# Patient Record
Sex: MALE | Race: WHITE | NOT HISPANIC OR LATINO | URBAN - METROPOLITAN AREA
[De-identification: names, ages, dates, MRNs, and addresses within clinical notes are randomized per-mention and may not be internally consistent; named-entity substitution may affect disease eponyms.]

---

## 2017-02-20 ENCOUNTER — APPOINTMENT (OUTPATIENT)
Dept: PHYSICAL THERAPY | Age: 67
End: 2017-02-20
Payer: COMMERCIAL

## 2017-02-20 PROCEDURE — 97162 PT EVAL MOD COMPLEX 30 MIN: CPT

## 2017-03-22 ENCOUNTER — APPOINTMENT (OUTPATIENT)
Dept: PHYSICAL THERAPY | Age: 67
End: 2017-03-22
Payer: COMMERCIAL

## 2017-03-22 PROCEDURE — 97750 PHYSICAL PERFORMANCE TEST: CPT

## 2017-03-22 PROCEDURE — 97530 THERAPEUTIC ACTIVITIES: CPT

## 2023-06-21 ENCOUNTER — RA CDI HCC (OUTPATIENT)
Dept: OTHER | Facility: HOSPITAL | Age: 73
End: 2023-06-21

## 2023-06-21 NOTE — PROGRESS NOTES
[facility] 75  coding opportunities       Chart reviewed, no opportunity found: CHART REVIEWED, NO OPPORTUNITY FOUND        Patients Insurance        Commercial Insurance: Rod Mariscal verbal cues/1 person assist

## 2023-08-23 ENCOUNTER — OFFICE VISIT (OUTPATIENT)
Dept: INTERNAL MEDICINE CLINIC | Age: 73
End: 2023-08-23
Payer: MEDICARE

## 2023-08-23 ENCOUNTER — TELEPHONE (OUTPATIENT)
Dept: ADMINISTRATIVE | Facility: OTHER | Age: 73
End: 2023-08-23

## 2023-08-23 VITALS
TEMPERATURE: 98.7 F | HEIGHT: 63 IN | SYSTOLIC BLOOD PRESSURE: 124 MMHG | DIASTOLIC BLOOD PRESSURE: 86 MMHG | OXYGEN SATURATION: 99 % | WEIGHT: 149.1 LBS | HEART RATE: 54 BPM | BODY MASS INDEX: 26.42 KG/M2

## 2023-08-23 DIAGNOSIS — R41.89 COGNITIVE IMPAIRMENT: Primary | ICD-10-CM

## 2023-08-23 DIAGNOSIS — G93.32 CHRONIC FATIGUE SYNDROME: ICD-10-CM

## 2023-08-23 DIAGNOSIS — D50.8 OTHER IRON DEFICIENCY ANEMIA: ICD-10-CM

## 2023-08-23 DIAGNOSIS — B35.1 ONYCHOMYCOSIS: ICD-10-CM

## 2023-08-23 PROBLEM — D50.9 IRON DEFICIENCY ANEMIA: Status: ACTIVE | Noted: 2023-08-23

## 2023-08-23 PROBLEM — L60.9 NAIL ABNORMALITY: Status: ACTIVE | Noted: 2023-08-23

## 2023-08-23 PROCEDURE — 99214 OFFICE O/P EST MOD 30 MIN: CPT | Performed by: INTERNAL MEDICINE

## 2023-08-23 RX ORDER — ITRACONAZOLE 100 MG/1
200 CAPSULE ORAL 2 TIMES DAILY
Qty: 28 CAPSULE | Refills: 2 | Status: SHIPPED | OUTPATIENT
Start: 2023-08-23 | End: 2023-08-30

## 2023-08-23 RX ORDER — CHLORAL HYDRATE 500 MG
2000 CAPSULE ORAL DAILY
COMMUNITY

## 2023-08-23 RX ORDER — MULTIVIT WITH MINERALS/LUTEIN
1 TABLET ORAL DAILY
COMMUNITY

## 2023-08-23 NOTE — PROGRESS NOTES
Name: Mateo Steinberg      : 1950      MRN: [id number]  Encounter Provider: Kasia Tolliver MD  Encounter Date: 2023   Encounter department: [address] [facility]     1. Cognitive impairment    2. Chronic fatigue syndrome      BMI Counseling: Body mass index is 26.48 kg/m². The BMI is above normal. Nutrition recommendations include decreasing portion sizes, encouraging healthy choices of fruits and vegetables and moderation in carbohydrate intake. Exercise recommendations include moderate physical activity 150 minutes/week. Rationale for BMI follow-up plan is due to patient being overweight or obese. Depression Screening and Follow-up Plan: Patient was screened for depression during today's encounter. They screened negative with a PHQ-2 score of 2. Subjective     HPI  Review of Systems   Constitutional: Positive for fatigue. Negative for appetite change and fever. HENT: Negative for congestion, ear pain, hearing loss, nosebleeds, sneezing, tinnitus and voice change. Eyes: Negative for pain, discharge and redness. Respiratory: Negative for cough, chest tightness and wheezing. Cardiovascular: Negative for chest pain, palpitations and leg swelling. Gastrointestinal: Negative for abdominal pain, blood in stool, constipation, diarrhea, nausea and vomiting. Genitourinary: Negative for difficulty urinating, dysuria, hematuria and urgency. Musculoskeletal: Negative for arthralgias, back pain, gait problem and joint swelling. Skin: Negative for rash and wound. Allergic/Immunologic: Negative for environmental allergies. Neurological: Negative for dizziness, tremors, seizures, weakness, light-headedness and numbness. Hematological: Negative for adenopathy. Does not bruise/bleed easily. Psychiatric/Behavioral: Positive for dysphoric mood. Negative for behavioral problems and confusion. The patient is nervous/anxious.         History Spoke with patient regarding the below.  Patient having some reflux at night.  Bentyl no helping.  Patient had a Paraesophageal hernia repair about 2 years ago.  Currently not taking any heartburn medication.  Discussed going back on Protonix and seeing if this helps control symptoms.  Patient agreeable.  Script for Protonix sent to patient's preferred pharmacy.  Patient aware to call office if symptoms do not improve.   reviewed. No pertinent past medical history. History reviewed. No pertinent surgical history.   Family History   Problem Relation Age of Onset    Brain cancer Mother     Aneurysm Father      Social History     Socioeconomic History    Marital status: /Civil Union     Spouse name: None    Number of children: None    Years of education: None    Highest education level: None   Occupational History    None   Tobacco Use    Smoking status: Former     Packs/day: 0.01     Years: 1.00     Total pack years: 0.01     Types: Cigarettes     Quit date:      Years since quittin.6    Smokeless tobacco: Never    Tobacco comments:     Social    Vaping Use    Vaping Use: Never used   Substance and Sexual Activity    Alcohol use: Not Currently    Drug use: Never    Sexual activity: None   Other Topics Concern    None   Social History Narrative    None     Social Determinants of Health     Financial Resource Strain: Not on file   Food Insecurity: Not on file   Transportation Needs: Not on file   Physical Activity: Not on file   Stress: Not on file   Social Connections: Not on file   Intimate Partner Violence: Not on file   Housing Stability: Not on file     Current Outpatient Medications on File Prior to Visit   Medication Sig    Ascorbic Acid (vitamin C) 1000 MG tablet Take 1 tablet by mouth in the morning    cycloSPORINE (Restasis) 0.05 % ophthalmic emulsion Administer 1 drop to both eyes every 12 (twelve) hours    MAGNESIUM CARBONATE PO Take 1 tablet by mouth in the morning    NON FORMULARY Triple magnesium (glycinate 10%, oxide 60%, malate 30%,)    NON FORMULARY Pycnogenol supplement    Omega-3 Fatty Acids (fish oil) 1,000 mg Take 2,000 mg by mouth daily    Pediatric Multivitamins-Fl (MULT-VITAMIN/FLUORIDE PO) Take 1 tablet by mouth in the morning     No Known Allergies  Immunization History   Administered Date(s) Administered    COVID-19 MODERNA VACC 0.5 ML IM 2021, 2021    INFLUENZA 2017, 11/01/2018, 11/08/2020, 11/07/2021    Pneumococcal Conjugate 13-Valent 02/26/2019    Pneumococcal Polysaccharide PPV23 11/15/2020    Tdap 04/04/2007, 08/22/2018    Tetanus Toxoid, Unspecified 08/22/2018    Zoster 11/15/2020    Zoster Vaccine Recombinant 11/13/2020       Objective     /86 (BP Location: Left arm, Patient Position: Sitting, Cuff Size: Standard)   Pulse (!) 54   Temp 98.7 °F (37.1 °C) (Temporal)   Ht 5' 2.91" (1.598 m)   Wt 67.6 kg (149 lb 1.6 oz)   SpO2 99%   BMI 26.48 kg/m²     Physical Exam  Constitutional:       Appearance: He is well-developed. HENT:      Right Ear: Tympanic membrane and external ear normal.      Left Ear: Tympanic membrane normal.   Eyes:      Conjunctiva/sclera: Conjunctivae normal.      Pupils: Pupils are equal, round, and reactive to light. Neck:      Thyroid: No thyromegaly. Vascular: No JVD. Cardiovascular:      Rate and Rhythm: Normal rate and regular rhythm. Heart sounds: Normal heart sounds. Pulmonary:      Breath sounds: Normal breath sounds. Abdominal:      General: Bowel sounds are normal.      Palpations: Abdomen is soft. Musculoskeletal:         General: Normal range of motion. Cervical back: Normal range of motion. Lymphadenopathy:      Cervical: No cervical adenopathy. Skin:     General: Skin is dry. Neurological:      General: No focal deficit present. Mental Status: He is alert and oriented to person, place, and time. Mental status is at baseline. Deep Tendon Reflexes: Reflexes are normal and symmetric.    Psychiatric:         Mood and Affect: Mood normal.         Behavior: Behavior normal.       Randal Santiago MD

## 2023-08-23 NOTE — TELEPHONE ENCOUNTER
Upon review of the In Basket request and the patient's chart, initial outreach has been made via fax to facility. Please see Contacts section for details.      Thank you  Melinda Rankin MA

## 2023-08-23 NOTE — LETTER
Procedure Request Form: Colonoscopy      Date Requested: 23  Patient: Alejandra Hayward  Patient : 1950   Referring Provider: Yas Thakur MD        Date of Procedure ______________________________       The above patient has informed us that they have completed their   most recent Colonoscopy at your facility. Please complete   this form and attach all corresponding procedure reports/results. Comments _____ DOS 2015 ____________________________________________  ____________________________________________________________________  ____________________________________________________________________  ____________________________________________________________________    Facility Completing Procedure _________________________________________    Form Completed By (print name) _______________________________________      Signature __________________________________________________________      These reports are needed for  compliance. Please fax this completed form and a copy of the procedure report to our office located at [address] [place] [address] as soon as possible to Fax [phone] judah Sequeirare: Phone [phone]    We thank you for your assistance in treating our mutual patient.

## 2023-08-23 NOTE — TELEPHONE ENCOUNTER
----- Message from Michael Sagastume MA sent at 8/23/2023 11:34 AM EDT -----  Regarding: colonoscopy  08/23/23 11:34 AM    Hello, our patient Diannah Denver has had CRC: Colonoscopy completed/performed. Please assist in updating the patient chart by making an External outreach to [facility]) facility located in [facility]. The date of service is unknown.     Thank you,  Michael Sagastume MA  [facility]

## 2023-08-24 NOTE — TELEPHONE ENCOUNTER
Upon review of the In Basket request we were able to locate, review, and update the patient chart as requested for CRC: Colonoscopy. Any additional questions or concerns should be emailed to the Practice Liaisons via the appropriate education email address, please do not reply via In Basket.     Thank you  Sailaja White MA

## 2023-08-25 ENCOUNTER — TELEPHONE (OUTPATIENT)
Dept: INTERNAL MEDICINE CLINIC | Age: 73
End: 2023-08-25

## 2023-08-25 NOTE — TELEPHONE ENCOUNTER
Received a fax from the pharmacy for prior authorization on Itraxonazole capsules     Will scan into Media and give the clinical team as well

## 2023-08-25 NOTE — TELEPHONE ENCOUNTER
This approval authorizes your coverage from 01/01/2023 - 02/21/2024.     Spoke to [place] and wife aware of approval.

## 2023-09-08 ENCOUNTER — TELEPHONE (OUTPATIENT)
Dept: INTERNAL MEDICINE CLINIC | Facility: OTHER | Age: 73
End: 2023-09-08

## 2024-01-22 ENCOUNTER — OFFICE VISIT (OUTPATIENT)
Dept: INTERNAL MEDICINE CLINIC | Age: 74
End: 2024-01-22
Payer: MEDICARE

## 2024-01-22 VITALS
BODY MASS INDEX: 27.64 KG/M2 | OXYGEN SATURATION: 98 % | WEIGHT: 156 LBS | SYSTOLIC BLOOD PRESSURE: 130 MMHG | TEMPERATURE: 97.8 F | HEART RATE: 68 BPM | DIASTOLIC BLOOD PRESSURE: 70 MMHG | HEIGHT: 63 IN

## 2024-01-22 DIAGNOSIS — M54.50 ACUTE LEFT-SIDED LOW BACK PAIN WITHOUT SCIATICA: Primary | ICD-10-CM

## 2024-01-22 PROCEDURE — 99213 OFFICE O/P EST LOW 20 MIN: CPT | Performed by: INTERNAL MEDICINE

## 2024-01-22 RX ORDER — NAPROXEN 500 MG/1
500 TABLET ORAL 2 TIMES DAILY WITH MEALS
Qty: 20 TABLET | Refills: 1 | Status: SHIPPED | OUTPATIENT
Start: 2024-01-22 | End: 2024-02-02 | Stop reason: ALTCHOICE

## 2024-01-22 NOTE — PROGRESS NOTES
"Assessment/Plan:    Acute left-sided low back pain without sciatica  Start naprosyn 500 mg BID  Take with food, avoid other nsaids  Offered flexeril 5mg but pt and wife defer  Ice alt heat, do not leave heat on for > 15 min, stretching  Refer to pt  Call if symptoms worsen       Diagnoses and all orders for this visit:    Acute left-sided low back pain without sciatica  -     naproxen (Naprosyn) 500 mg tablet; Take 1 tablet (500 mg total) by mouth 2 (two) times a day with meals  -     Ambulatory Referral to Physical Therapy; Future               Subjective:          Patient ID: Jose Raza is a 73 y.o. male.    He was lifting weights and walks on the treadmill.  He noticed his back was sore but does not remember a specific inciting event. He then needed to shovel the driveway as his  was broken and tehn pain increased and has not really improved since that time.  He has tried heat so much that is caused the skin to blister.  He has tried advil 200 mg with some relief.      Back Pain  This is a new problem. The current episode started 1 to 4 weeks ago (2-3 weeks). The problem occurs constantly. The problem has been waxing and waning since onset. The pain is present in the lumbar spine. Quality: \"its not good\" The pain does not radiate. The pain is at a severity of 10/10. The pain is severe. The pain is Worse during the day. Exacerbated by: getting up. Pertinent negatives include no abdominal pain, bladder incontinence, bowel incontinence, chest pain, dysuria, fever, headaches, leg pain, numbness, paresis, paresthesias, perianal numbness, tingling, weakness or weight loss. He has tried heat and NSAIDs for the symptoms. The treatment provided mild relief.       The following portions of the patient's history were reviewed and updated as appropriate: allergies, current medications, past family history, past medical history, past social history, past surgical history, and problem list.    Review of Systems " "  Constitutional:  Negative for chills, fatigue, fever and weight loss.   HENT:  Positive for rhinorrhea. Negative for congestion and sore throat.         Chronic \"its the season\"   Eyes:  Negative for visual disturbance.   Respiratory:  Negative for cough and shortness of breath.    Cardiovascular:  Negative for chest pain, palpitations and leg swelling.   Gastrointestinal:  Negative for abdominal pain, bowel incontinence, constipation, diarrhea, nausea and vomiting.   Genitourinary:  Negative for bladder incontinence and dysuria.   Musculoskeletal:  Positive for back pain.   Neurological:  Negative for dizziness, tingling, weakness, numbness, headaches and paresthesias.   Psychiatric/Behavioral:  Negative for dysphoric mood. The patient is not nervous/anxious.          History reviewed. No pertinent past medical history.      Current Outpatient Medications:     Ascorbic Acid (vitamin C) 1000 MG tablet, Take 1 tablet by mouth in the morning, Disp: , Rfl:     cycloSPORINE (Restasis) 0.05 % ophthalmic emulsion, Administer 1 drop to both eyes every 12 (twelve) hours, Disp: , Rfl:     MAGNESIUM CARBONATE PO, Take 1 tablet by mouth in the morning, Disp: , Rfl:     naproxen (Naprosyn) 500 mg tablet, Take 1 tablet (500 mg total) by mouth 2 (two) times a day with meals, Disp: 20 tablet, Rfl: 1    NON FORMULARY, Triple magnesium (glycinate 10%, oxide 60%, malate 30%,), Disp: , Rfl:     NON FORMULARY, Pycnogenol supplement, Disp: , Rfl:     Omega-3 Fatty Acids (fish oil) 1,000 mg, Take 2,000 mg by mouth daily, Disp: , Rfl:     Pediatric Multivitamins-Fl (MULT-VITAMIN/FLUORIDE PO), Take 1 tablet by mouth in the morning, Disp: , Rfl:     No Known Allergies    Social History   Past Surgical History:   Procedure Laterality Date    HERNIA REPAIR       Family History   Problem Relation Age of Onset    Brain cancer Mother     Aneurysm Father        Health Maintenance   Topic Date Due    Hepatitis C Screening  Never done    Medicare " "Annual Wellness Visit (AWV)  Never done    Fall Risk  Never done    Zoster Vaccine (2 of 2) 01/10/2021    Influenza Vaccine (1) 09/01/2023    COVID-19 Vaccine (3 - 2023-24 season) 09/01/2023    Depression Screening  08/23/2024    Colorectal Cancer Screening  04/30/2025    Pneumococcal Vaccine: 65+ Years  Completed    HIB Vaccine  Aged Out    IPV Vaccine  Aged Out    Hepatitis A Vaccine  Aged Out    Meningococcal ACWY Vaccine  Aged Out    HPV Vaccine  Aged Out     Immunization History   Administered Date(s) Administered    COVID-19 MODERNA VACC 0.5 ML IM 01/22/2021, 02/26/2021    INFLUENZA 11/12/2017, 11/01/2018, 11/08/2020, 11/07/2021    Pneumococcal Conjugate 13-Valent 02/26/2019    Pneumococcal Polysaccharide PPV23 11/15/2020    Tdap 04/04/2007, 08/22/2018    Tetanus Toxoid, Unspecified 08/22/2018    Zoster 11/15/2020    Zoster Vaccine Recombinant 11/13/2020         Objective:  /70 (BP Location: Left arm, Patient Position: Sitting, Cuff Size: Standard)   Pulse 68   Temp 97.8 °F (36.6 °C) (Temporal)   Ht 5' 2.91\" (1.598 m)   Wt 70.8 kg (156 lb)   SpO2 98%   BMI 27.71 kg/m²   Body mass index is 27.71 kg/m².     Physical Exam  Constitutional:       General: He is not in acute distress.     Appearance: He is well-developed. He is not diaphoretic.   HENT:      Head: Normocephalic and atraumatic.      Right Ear: External ear normal.      Left Ear: External ear normal.      Nose: Nose normal.      Mouth/Throat:      Pharynx: No oropharyngeal exudate.   Eyes:      Conjunctiva/sclera: Conjunctivae normal.      Pupils: Pupils are equal, round, and reactive to light.   Cardiovascular:      Rate and Rhythm: Normal rate and regular rhythm.      Heart sounds: Normal heart sounds. No murmur heard.  Pulmonary:      Effort: Pulmonary effort is normal. No respiratory distress.      Breath sounds: Normal breath sounds. No wheezing or rales.   Musculoskeletal:         General: No tenderness. Normal range of motion.      " Cervical back: Normal range of motion and neck supple.      Right lower leg: No edema.      Left lower leg: No edema.      Comments: Back examined.  NTTP, FROM but has pain with flexion ext and left lateral bending.  - SLR bilaterally   Lymphadenopathy:      Cervical: No cervical adenopathy.   Skin:     Comments: Healing burn midline low back.  No drainage warmth or signs of infection   Neurological:      Mental Status: He is alert and oriented to person, place, and time.      Motor: No weakness.      Deep Tendon Reflexes: Reflexes normal.      Comments: Strength 5/5 BLE, reflexes 2+ patellar and achilles

## 2024-01-22 NOTE — ASSESSMENT & PLAN NOTE
Start naprosyn 500 mg BID  Take with food, avoid other nsaids  Offered flexeril 5mg but pt and wife defer  Ice alt heat, do not leave heat on for > 15 min, stretching  Refer to pt  Call if symptoms worsen

## 2024-01-26 NOTE — PROGRESS NOTES
"Speech-Language Pathology Initial Evaluation    Today's date: 2024   Patient’s name: Jose Raza  : 1950  MRN: [id number]  Safety measures: Cognitive-linguistic deficits,   Referring provider: Vivian Calderon CRNP    Encounter Diagnosis     ICD-10-CM    1. Other symbolic dysfunctions  R48.8       2. Age-related cognitive decline  R41.81         Visit tracking:    Re-eval Due POC Expires Auth Expiration Date  Visit Limit   24 or 10th visit 24 or 10th visit 24 BOMN                           Visit/Unit Tracking  AUTH Status:  Date 24   No authorization needed Used 1    Remaining  9     Subjective comments: Patient reports that he is good.    His wife reports that this is the third time that he has had a script for speech. He had speech therapy before for a couple of sessions and didn't have a good experience. A few years later he had another session of speech therapy for about 6 weeks.     He has memory loss and he is taking a lot of supplements to counteract that. They correlate this with his long history of candida.     He needs tips on word finding and thought processing skills.     They feel that he is relatively the same since he first was referred to .  He has been dealing with this for roughly 5 years.     He also deals with anxiety. 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    He had seen someone for anxiety but isn't currently seeing anyone. He was on Zoloft at one point.     They would like tips for dealing with anxiety as it relates to his cognition. He would like tips/an arsenal of strategies to reduce symptoms, therefore decreasing anxiety.    Why did the patient choose us? Physician    Patient's goal(s): \"Having the ability to do things that I had been doing in the past.\"    Reason for referral: Change in cognitive status  Prior functional status: Communication effective and " "appropriate in all situations  Clinically complex situations: Previous therapy to address similar deficits    History: Patient is a 73 y.o. male who was referred to outpatient skilled Speech Therapy services for a cognitive-linguistic evaluation.     Patient has a prior medical history of the following: chronic fatigue syndrome, cognitive impairment and iron deficiency anemia (see history for full list). Patient's wife reports history of candida.     As the patient's referral was not associated with a specific visit with family medicine, recent information on the patient's medical history was limited. Following discussion with the patient, his wife and further chart review, the clinician uncovered medical history from 2019 to 2021 when the patient received outpatients ST services from [facility]. The following is a timeline of evaluation and treatment by [facility] and [facility].    2/27/19 MRI of Brain:    \"Impression:   1. No acute intracranial hemorrhage or acute infarction.   2. Mild interval progression of cerebral volume loss.   3. Grossly stable cerebral white matter disease. Differential considerations   include chronic microvascular ischemia, demyelination, Lyme disease, vasculitis   or chronic migraine syndromes.\"    8/22/19 Neurology evaluation [facility] with Dr. Kaitlyn Gupta:  \"IMPRESSION:   In summary, Jose Raza is a 69 y.o. man presenting to the [facility] for evaluation of 3+ years of memory and language difficulties. On psychometric testing, the patient performed below expectations in multiple cognitive domains. Given his mild functional impairment (FRS 2), the patient meets clinical criteria for mild cognitive impairment. Review of MRI brain w/o contrast revealed progressive biparietal and hippocampal atrophy suggestive of amyloid pathology. I recommend consultation to speech pathology. I discussed the " "typical rate of progression of MCI to dementia is 10-12% per year. N.b. When discussing the diagnosis the patient almost fainted and had to be helped to the bed to lay down for a few minutes from what the patient called \"shock\" at the diagnosis. I discussed possible enrollment in clinical trials (ADNI3, PEGASUS, ABC). I asked the patient and family to meet with  after this visit to discuss management of MCI and possible enrollment in clinical trials further.    I strongly encouraged social, mental, and physical activity; all of which have demonstrated some potential capacity to slow down the course of cognitive decline. I think regular exercise and social activities are great.\"    10/29/19 ST Evaluation at [facility] with EMANUEL Lozada. Patient scored 28/46 on BCAT (unable to assess visuospatial). Patient participated in 2 treatment sessions. Clinician recommended follow-up with Neurology.     5/21/20 - Telemedicine visit with Dr. 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 MRI ordered. No evidence of repeat MRI in chart.    9/13/21 - 10/25/21 ST Evaluation and Treatment at [facility] with EMANUEL Ng. Patient scored 30/50 on the BCAT.     No further ST treatment, Neurology follow-up or repeat MRIs noted.     Hearing: WFL  Vision: Wears prescription glasses    Home environment/lifestyle: Lives with his wife  Highest level of education: Bachelor's degree  Vocational status: Volunteers as a [employer] . He retired from a chemical company. He was a .     Mental status: Alert  Behavior status: Cooperative  Patient reported symptoms of:  Wife reported symptoms of anxiety noted in the patient.   Communication modalities: Verbal  Rehabilitation prognosis: Fair rehab potential to reach and maintain prior level of function    Assessments    The Brief Cognitive Assessment Test (BCAT) is a multi-domain cognitive tool that assesses a patient’s " "orientation, verbal recall, visual recognition, visual recall, attention, abstraction, language, executive functions, and visuospatial processing. BCAT is sensitive to the full spectrum of cognitive functioning (normal, MCI, mild dementia, moderate-severe dementia) and can predict basic and instrumental activities of daily living (ADL, IADL).     During today’s administration of the test, patient achieved a total score of 14/50 points.     Using the BCAT Crosswalk Table as a reference, patient’s score falls within the range and may be suggestive of \"Moderate to Severe Dementia\". The Crosswalk Table suggests the following:      Cognitive Stage: Moderate to Severe Dementia (BCAT Range: 0-25 // MMSE: 0-18 // GDS: 5-7)  Cognitive & Functional Issues: Moderate (upper end of range) - Pervasive functional deficits (IADLs), but ADLs generally intact; marked deficits in memory and executive functions; behavioral and psychological symptoms are common; requires significant residential support. // Severe (lower end of range) - Needs assistance in ADLs/IADLs; pervasive cognitive deficits; requires complex care.      Goals    Short Term Goals  Patient will be educated on the use of internal and external memory aids and compensatory strategies to facilitate increased recall of routine, personal information, and recent events, to be achieved in 4-6 weeks.    Patient will complete auditory immediate and short term memory tasks to facilitate increased ability to retell narratives and recall information within functional living environment, to be achieved in 4-6 weeks.    Patient will complete complex auditory attention processing tasks (e.g., sentence unscramble, ranking numbers/words, etc.) to improve working memory with 80% accuracy, to be achieved in 4-6 weeks.    Patient will complete thought organization tasks (e.g., sequencing, deduction puzzles, etc.) with 80% accuracy to facilitate increased executive functioning, working " "memory, problem solving, and processing skills, to be achieved in 4-6 weeks.    Patient will complete concrete and abstract categorization tasks to 80% accuracy to facilitate improved generative naming skills and working memory, to be achieved in 4-6 weeks.    Patient will utilize word finding strategies during semantic feature analysis treatment activity for improved naming and verbal expression skills, to be achieved in 4-6 weeks.    Patient will complete word generation tasks (e.g., analogies, category matrices, etc.) with 80% accuracy using word finding strategies to facilitate improved word retrieval skills, to be achieved in 4-6 weeks.    Long Term Goals    Patient will demonstrate cognitive-communication skills consistent with age and education given use of compensatory strategies when needed to resume baseline activities and responsibilities in home, community, and work/school settings by discharge.     Patient will complete cognitive-linguistic therapy that addresses patient’s specific deficits in processing speed, short-term working memory, attention to detail, monitoring, sequencing, and organization skills, with instruction, to alleviate effects of executive functioning disorder deficits by discharge.    Patient will complete higher-level expressive language tasks (e.g., word definitions, idioms, synonym/antonyms, etc) with 80% accuracy to improve functional communication skills by discharge.       Impressions/Recommendations    Impressions:   - Based upon today's evaluation, utilizing the BCAT cognitive assessment, the patient presents with cognition most correlated to \"Moderate to Severe Dementia\". The patient was pleasant and calm throughout the evaluation but required repetition of each prompt and an extended processing time.     He was able to converse but conversation was limited. His wife provided much of his history. Based upon patient's history with evaluation and treatment for cognitive " deficits, clinician does not suspect that word finding difficulty and cognitive deficits are heavily influenced by anxiety. Rather, based upon diagnostic neurological findings, suspect that the patient is in cognitive-linguistic decline due to brain changes, specifically, reduction in volume of the hippocampus, as noted by Dr. Gupta of [facility] (Neurology). The patient has not had updated diagnostic evaluation of the brain since. Suspect that there may be further changes and would recommend follow-up with neurology and repeat MRI.    It should be noted, when the clinician began to discuss results of today's evaluation, the patient's wife requested that the patient leave the room. The patient's wife was not pleased with the administration of the BCAT as she felt that it made the patient anxious. From the clinician's perspective, the patient did not appear anxious, rather he merely had significant difficulty with much of the test. He appeared calm and did not demonstrate typical signs of anxiety. It took him a long time to process each prompt and required repetition of prompts at each step. It should also be noted that the patient's BCAT score has significantly declined since last administered in 2021 by ST staff at [facility].     ST is recommend to address global cognitive linguistic deficits. Strongly recommend referral to neurology and repeat MRI of brain. Plan of care was discussed with the patient's wife. She wishes to think further about recommendations. Patient will be placed on a 30 day hold, should he choose to purse ST services. Following the 30 day hold, patient will be discharged should ST service not be resumed by the patient and his wife.     Recommendations:  -Patient would benefit from outpatient skilled Speech Therapy services: Cognitive-linguistic therapy    -Frequency: 1-2x weekly  -Duration: 4-6 weeks - Patient currently on 30 day hold    -Intervention certification from:  1/29/2024  -Intervention certification to: 2/29/2024 or 10th visit    -Intervention comments:   45 minutes of sound language comprehension time.

## 2024-01-29 ENCOUNTER — EVALUATION (OUTPATIENT)
Dept: SPEECH THERAPY | Facility: CLINIC | Age: 74
End: 2024-01-29
Payer: MEDICARE

## 2024-01-29 DIAGNOSIS — R41.81 AGE-RELATED COGNITIVE DECLINE: ICD-10-CM

## 2024-01-29 DIAGNOSIS — R48.8 OTHER SYMBOLIC DYSFUNCTIONS: Primary | ICD-10-CM

## 2024-01-29 PROCEDURE — 92523 SPEECH SOUND LANG COMPREHEN: CPT | Performed by: SPEECH-LANGUAGE PATHOLOGIST

## 2024-01-29 NOTE — LETTER
2024    NICOL Iraheta  [address] [place], [place] [id number]    Patient: Jose Raza   YOB: 1950   Date of Visit: 2024     Encounter Diagnosis     ICD-10-CM    1. Other symbolic dysfunctions  R48.8       2. Age-related cognitive decline  R41.81           Dear Dr. Calderon:    Thank you for your recent referral of Jose Raza. Please review the attached evaluation summary from Jose's recent visit.     Please verify that you agree with the plan of care by signing the attached order.     If you have any questions or concerns, please do not hesitate to call.     I sincerely appreciate the opportunity to share in the care of one of your patients and hope to have another opportunity to work with you in the near future.     Sincerely,    Emily Landon, SLP      Referring Provider:     Based upon review of the patient's progress and continued therapy plan, it is my medical opinion that Jose Raza should continue speech therapy treatment at the Physical Therapy at [address]:                    NICOL Iraheta  [address] [place] [id number]  Via Fax: [phone]        Speech-Language Pathology Initial Evaluation    Today's date: 2024   Patient’s name: Jose Raza  : 1950  MRN: [id number]  Safety measures: Cognitive-linguistic deficits,   Referring provider: Vivian Calderon CRNP    Encounter Diagnosis     ICD-10-CM    1. Other symbolic dysfunctions  R48.8       2. Age-related cognitive decline  R41.81         Visit tracking:    Re-eval Due POC Expires Auth Expiration Date ST Visit Limit   24 or 10th visit 24 or 10th visit 24 BOMN                           Visit/Unit Tracking  AUTH Status:  Date 24   No authorization needed Used 1    Remaining  9     Subjective comments: Patient reports that he is good.    His wife reports that this is the third time that he has had a script for speech. He had speech therapy before  "for a couple of sessions and didn't have a good experience. A few years later he had another session of speech therapy for about 6 weeks.     He has memory loss and he is taking a lot of supplements to counteract that. They correlate this with his long history of candida.     He needs tips on word finding and thought processing skills.     They feel that he is relatively the same since he first was referred to ST.  He has been dealing with this for roughly 5 years.     He also deals with anxiety. 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    He had seen someone for anxiety but isn't currently seeing anyone. He was on Zoloft at one point.     They would like tips for dealing with anxiety as it relates to his cognition. He would like tips/an arsenal of strategies to reduce symptoms, therefore decreasing anxiety.    Why did the patient choose us? Physician    Patient's goal(s): \"Having the ability to do things that I had been doing in the past.\"    Reason for referral: Change in cognitive status  Prior functional status: Communication effective and appropriate in all situations  Clinically complex situations: Previous therapy to address similar deficits    History: Patient is a 73 y.o. male who was referred to outpatient skilled Speech Therapy services for a cognitive-linguistic evaluation.     Patient has a prior medical history of the following: chronic fatigue syndrome, cognitive impairment and iron deficiency anemia (see history for full list). Patient's wife reports history of candida.     As the patient's referral was not associated with a specific visit with family medicine, recent information on the patient's medical history was limited. Following discussion with the patient, his wife and further chart review, the clinician uncovered medical history from 2019 to 2021 when the patient received outpatients ST services from " "[facility]. The following is a timeline of evaluation and treatment by [facility] and [facility].    2/27/19 MRI of Brain:    \"Impression:   1. No acute intracranial hemorrhage or acute infarction.   2. Mild interval progression of cerebral volume loss.   3. Grossly stable cerebral white matter disease. Differential considerations   include chronic microvascular ischemia, demyelination, Lyme disease, vasculitis   or chronic migraine syndromes.\"    8/22/19 Neurology evaluation [facility] with Dr. Kaitlyn Gupta:  \"IMPRESSION:   In summary, Jose Raza is a 69 y.o. man presenting to the [facility] for evaluation of 3+ years of memory and language difficulties. On psychometric testing, the patient performed below expectations in multiple cognitive domains. Given his mild functional impairment (FRS 2), the patient meets clinical criteria for mild cognitive impairment. Review of MRI brain w/o contrast revealed progressive biparietal and hippocampal atrophy suggestive of amyloid pathology. I recommend consultation to speech pathology. I discussed the typical rate of progression of MCI to dementia is 10-12% per year. N.b. When discussing the diagnosis the patient almost fainted and had to be helped to the bed to lay down for a few minutes from what the patient called \"shock\" at the diagnosis. I discussed possible enrollment in clinical trials (ADNI3, PEGASUS, ABC). I asked the patient and family to meet with SW after this visit to discuss management of MCI and possible enrollment in clinical trials further.    I strongly encouraged social, mental, and physical activity; all of which have demonstrated some potential capacity to slow down the course of cognitive decline. I think regular exercise and social activities are great.\"    10/29/19 ST Evaluation at [facility] OP with Fabi Kate, SLP. Patient scored 28/46 on BCAT (unable to assess " "visuospatial). Patient participated in 2 treatment sessions. Clinician recommended follow-up with Neurology.     5/21/20 - Telemedicine visit with Dr. 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 MRI ordered. No evidence of repeat MRI in chart.    9/13/21 - 10/25/21 ST Evaluation and Treatment at [facility] with EMANUEL Ng. Patient scored 30/50 on the BCAT.     No further ST treatment, Neurology follow-up or repeat MRIs noted.     Hearing: WFL  Vision: Wears prescription glasses    Home environment/lifestyle: Lives with his wife  Highest level of education: Bachelor's degree  Vocational status: Volunteers as a [employer] . He retired from a chemical company. He was a .     Mental status: Alert  Behavior status: Cooperative  Patient reported symptoms of:  Wife reported symptoms of anxiety noted in the patient.   Communication modalities: Verbal  Rehabilitation prognosis: Fair rehab potential to reach and maintain prior level of function    Assessments    The Brief Cognitive Assessment Test (BCAT) is a multi-domain cognitive tool that assesses a patient’s orientation, verbal recall, visual recognition, visual recall, attention, abstraction, language, executive functions, and visuospatial processing. BCAT is sensitive to the full spectrum of cognitive functioning (normal, MCI, mild dementia, moderate-severe dementia) and can predict basic and instrumental activities of daily living (ADL, IADL).     During today’s administration of the test, patient achieved a total score of 14/50 points.     Using the BCAT Crosswalk Table as a reference, patient’s score falls within the range and may be suggestive of \"Moderate to Severe Dementia\". The Crosswalk Table suggests the following:      Cognitive Stage: Moderate to Severe Dementia (BCAT Range: 0-25 // MMSE: 0-18 // GDS: 5-7)  Cognitive & Functional Issues: Moderate (upper end of range) - Pervasive " functional deficits (IADLs), but ADLs generally intact; marked deficits in memory and executive functions; behavioral and psychological symptoms are common; requires significant residential support. // Severe (lower end of range) - Needs assistance in ADLs/IADLs; pervasive cognitive deficits; requires complex care.      Goals    Short Term Goals  Patient will be educated on the use of internal and external memory aids and compensatory strategies to facilitate increased recall of routine, personal information, and recent events, to be achieved in 4-6 weeks.    Patient will complete auditory immediate and short term memory tasks to facilitate increased ability to retell narratives and recall information within functional living environment, to be achieved in 4-6 weeks.    Patient will complete complex auditory attention processing tasks (e.g., sentence unscramble, ranking numbers/words, etc.) to improve working memory with 80% accuracy, to be achieved in 4-6 weeks.    Patient will complete thought organization tasks (e.g., sequencing, deduction puzzles, etc.) with 80% accuracy to facilitate increased executive functioning, working memory, problem solving, and processing skills, to be achieved in 4-6 weeks.    Patient will complete concrete and abstract categorization tasks to 80% accuracy to facilitate improved generative naming skills and working memory, to be achieved in 4-6 weeks.    Patient will utilize word finding strategies during semantic feature analysis treatment activity for improved naming and verbal expression skills, to be achieved in 4-6 weeks.    Patient will complete word generation tasks (e.g., analogies, category matrices, etc.) with 80% accuracy using word finding strategies to facilitate improved word retrieval skills, to be achieved in 4-6 weeks.    Long Term Goals    Patient will demonstrate cognitive-communication skills consistent with age and education given use of compensatory strategies  "when needed to resume baseline activities and responsibilities in home, community, and work/school settings by discharge.     Patient will complete cognitive-linguistic therapy that addresses patient’s specific deficits in processing speed, short-term working memory, attention to detail, monitoring, sequencing, and organization skills, with instruction, to alleviate effects of executive functioning disorder deficits by discharge.    Patient will complete higher-level expressive language tasks (e.g., word definitions, idioms, synonym/antonyms, etc) with 80% accuracy to improve functional communication skills by discharge.       Impressions/Recommendations    Impressions:   - Based upon today's evaluation, utilizing the BCAT cognitive assessment, the patient presents with cognition most correlated to \"Moderate to Severe Dementia\". The patient was pleasant and calm throughout the evaluation but required repetition of each prompt and an extended processing time.     He was able to converse but conversation was limited. His wife provided much of his history. Based upon patient's history with evaluation and treatment for cognitive deficits, clinician does not suspect that word finding difficulty and cognitive deficits are heavily influenced by anxiety. Rather, based upon diagnostic neurological findings, suspect that the patient is in cognitive-linguistic decline due to brain changes, specifically, reduction in volume of the hippocampus, as noted by Dr. Gupta of the [facility] (Neurology). The patient has not had updated diagnostic evaluation of the brain since. Suspect that there may be further changes and would recommend follow-up with neurology and repeat MRI.    It should be noted, when the clinician began to discuss results of today's evaluation, the patient's wife requested that the patient leave the room. The patient's wife was not pleased with the administration of the BCAT as she felt that it " made the patient anxious. From the clinician's perspective, the patient did not appear anxious, rather he merely had significant difficulty with much of the test. He appeared calm and did not demonstrate typical signs of anxiety. It took him a long time to process each prompt and required repetition of prompts at each step. It should also be noted that the patient's BCAT score has significantly declined since last administered in 2021 by ST staff at [facility].     ST is recommend to address global cognitive linguistic deficits. Strongly recommend referral to neurology and repeat MRI of brain. Plan of care was discussed with the patient's wife. She wishes to think further about recommendations. Patient will be placed on a 30 day hold, should he choose to purse ST services. Following the 30 day hold, patient will be discharged should ST service not be resumed by the patient and his wife.     Recommendations:  -Patient would benefit from outpatient skilled Speech Therapy services: Cognitive-linguistic therapy    -Frequency: 1-2x weekly  -Duration: 4-6 weeks - Patient currently on 30 day hold    -Intervention certification from: 1/29/2024  -Intervention certification to: 2/29/2024 or 10th visit    -Intervention comments:   45 minutes of sound language comprehension time.

## 2024-01-30 ENCOUNTER — EVALUATION (OUTPATIENT)
Dept: PHYSICAL THERAPY | Age: 74
End: 2024-01-30
Payer: MEDICARE

## 2024-01-30 DIAGNOSIS — M54.50 ACUTE LEFT-SIDED LOW BACK PAIN WITHOUT SCIATICA: Primary | ICD-10-CM

## 2024-01-30 PROCEDURE — 97161 PT EVAL LOW COMPLEX 20 MIN: CPT

## 2024-01-30 PROCEDURE — 97110 THERAPEUTIC EXERCISES: CPT

## 2024-01-30 NOTE — PROGRESS NOTES
PT Evaluation     Today's date: 2024  Patient name: Jose Raza  : 1950  MRN: [id number]  Referring provider: Shonna Goyal PA-C  Dx:   Encounter Diagnosis     ICD-10-CM    1. Acute left-sided low back pain without sciatica  M54.50 Ambulatory Referral to Physical Therapy                     Assessment  Assessment details: Patient is a 73 year old male presenting to therapy accompanied by spouse with c/c of acute L sided LBP without radicular symptoms. Pain is localized to L lumbar spine region. Patient demonstrates near full LE strength bilaterally. SLR (-) B/L. Signs/symptoms consistent with acute LBP. Patient would benefit from skilled outpt PT to address deficits and improve abilities with all functional tasks.   Impairments: abnormal gait, abnormal or restricted ROM, activity intolerance, lacks appropriate home exercise program and pain with function  Functional limitations: Difficulties with bending/lifting tasks, ambulation, transfersUnderstanding of Dx/Px/POC: good   Prognosis: good    Goals  STG (4 weeks):  1) Education: Patient to demonstrate compliance with attendance of therapy sessions and performance of introductory HEP.  2) Pain: Patient to report <5/10 pain during his day to day activities, allowing for improvements with bending tasks.    LTG(8 weeks):  1) Education: Patient to demonstrate compliance with attendance of therapy sessions and performance of progressive HEP, allowing for transition to self-management of symptoms.  2) Pain: Patient to report <1/10 pain during his day to day activities, allowing for improvements with bending/lifting tasks.  3) ROM: Patient to demonstrate full lumbar AROM in all directions, allowing for improvements with all functional activities.    Plan  Patient would benefit from: PT eval  Planned modality interventions: cryotherapy, TENS and thermotherapy: hydrocollator packs  Planned therapy interventions: abdominal trunk stabilization, IASTM, joint  mobilization, manual therapy, ADL training, functional ROM exercises, gait training, flexibility, home exercise program, therapeutic exercise, therapeutic activities, stretching, strengthening, patient education, neuromuscular re-education, nerve gliding and transfer training  Frequency: 1x week  Duration in visits: 8  Treatment plan discussed with: patient and family        Subjective Evaluation    History of Present Illness  Mechanism of injury: Patient presents to therapy with c/c of LBP, onset ~3 weeks ago, which he believes may have started after lifting weights and then needing to shovel snow. Patient denies any symptoms down his Les including pain, numbness, or tingling, and localizes his pain to his L sided lumbar spine. Patient was prescribed medication for this pain, but has not picked up thus far - using Motrin as needed. Patient reports that pain is consistent, and has not improved since onset. Patient reports that he gets some relief of symptoms when laying down. Patient denies abdominal pain, dysuria, incontinence, weakness, etc.  Patient Goals  Patient goals for therapy: decreased pain    Pain  At worst pain rating: 10          Objective     Concurrent Complaints  Negative for night pain, disturbed sleep, bladder dysfunction, bowel dysfunction and saddle (S4) numbness    Tenderness     Additional Tenderness Details  TTP L PSIS region    Active Range of Motion     Lumbar   Flexion:  with pain Restriction level: moderate  Extension:  with pain Restriction level: minimal  Left lateral flexion:  with pain Restriction level: moderate  Right lateral flexion:  WFL  Left rotation:  WFL  Right rotation:  WFL    Additional Active Range of Motion Details  Pain with lumbar flexion when returning to upright position    Strength/Myotome Testing     Left Hip   Planes of Motion   Flexion: 5  Abduction: 4+  Adduction: 5    Right Hip   Planes of Motion   Flexion: 5  Abduction: 4+  Adduction: 5    Left Knee   Flexion:  5  Extension: 5    Right Knee   Flexion: 5  Extension: 5    Left Ankle/Foot   Dorsiflexion: 5  Great toe extension: 5    Right Ankle/Foot   Dorsiflexion: 5  Great toe extension: 5    Tests     Lumbar     Left   Negative passive SLR.     Right   Negative passive SLR.     Left Hip   Negative CHAPITO.     Right Hip   Negative CHAPITO.     Ambulation     Comments   Antalgic gait, with decreased trunk rotation             Precautions: N/A      Manuals 01/30                                                                Neuro Re-Ed                                                                                                        Ther Ex             LTR 10x (omitted due to pain)            Cat-cow 2 x 10            SKTC 3 x 30sec            Piriformis stretch  3 x 30sec            Eduction on load management/activity modification 5'                                                   Ther Activity                                       Gait Training                                       Modalities

## 2024-02-02 ENCOUNTER — NURSE TRIAGE (OUTPATIENT)
Dept: OTHER | Facility: OTHER | Age: 74
End: 2024-02-02

## 2024-02-02 ENCOUNTER — OFFICE VISIT (OUTPATIENT)
Dept: INTERNAL MEDICINE CLINIC | Age: 74
End: 2024-02-02
Payer: MEDICARE

## 2024-02-02 ENCOUNTER — APPOINTMENT (OUTPATIENT)
Dept: LAB | Age: 74
End: 2024-02-02
Payer: MEDICARE

## 2024-02-02 ENCOUNTER — HOSPITAL ENCOUNTER (EMERGENCY)
Facility: HOSPITAL | Age: 74
Discharge: HOME | End: 2024-02-03
Attending: EMERGENCY MEDICINE
Payer: MEDICARE

## 2024-02-02 ENCOUNTER — HOSPITAL ENCOUNTER (OUTPATIENT)
Dept: RADIOLOGY | Age: 74
Discharge: HOME | End: 2024-02-02
Payer: MEDICARE

## 2024-02-02 VITALS
WEIGHT: 157 LBS | TEMPERATURE: 98 F | BODY MASS INDEX: 27.82 KG/M2 | HEIGHT: 63 IN | OXYGEN SATURATION: 96 % | DIASTOLIC BLOOD PRESSURE: 90 MMHG | HEART RATE: 54 BPM | SYSTOLIC BLOOD PRESSURE: 120 MMHG

## 2024-02-02 VITALS
HEART RATE: 50 BPM | OXYGEN SATURATION: 99 % | DIASTOLIC BLOOD PRESSURE: 88 MMHG | TEMPERATURE: 97.8 F | SYSTOLIC BLOOD PRESSURE: 154 MMHG | RESPIRATION RATE: 18 BRPM

## 2024-02-02 DIAGNOSIS — N13.2 HYDRONEPHROSIS WITH OBSTRUCTING CALCULUS: ICD-10-CM

## 2024-02-02 DIAGNOSIS — R10.9 LEFT FLANK PAIN: ICD-10-CM

## 2024-02-02 DIAGNOSIS — D50.8 OTHER IRON DEFICIENCY ANEMIA: ICD-10-CM

## 2024-02-02 DIAGNOSIS — R35.0 URINARY FREQUENCY: ICD-10-CM

## 2024-02-02 DIAGNOSIS — Z12.5 ENCOUNTER FOR SCREENING FOR MALIGNANT NEOPLASM OF PROSTATE: ICD-10-CM

## 2024-02-02 DIAGNOSIS — Q62.11 HYDRONEPHROSIS WITH URETEROPELVIC JUNCTION (UPJ) OBSTRUCTION: ICD-10-CM

## 2024-02-02 DIAGNOSIS — R39.9 LOWER URINARY TRACT SYMPTOMS (LUTS): ICD-10-CM

## 2024-02-02 DIAGNOSIS — R41.89 COGNITIVE IMPAIRMENT: ICD-10-CM

## 2024-02-02 DIAGNOSIS — M54.50 ACUTE BILATERAL LOW BACK PAIN WITHOUT SCIATICA: ICD-10-CM

## 2024-02-02 DIAGNOSIS — M48.56XA COMPRESSION FRACTURE OF LUMBAR SPINE, NON-TRAUMATIC (HCC): ICD-10-CM

## 2024-02-02 DIAGNOSIS — R33.9 URINARY RETENTION: Primary | ICD-10-CM

## 2024-02-02 DIAGNOSIS — R31.29 OTHER MICROSCOPIC HEMATURIA: ICD-10-CM

## 2024-02-02 DIAGNOSIS — G93.32 CHRONIC FATIGUE SYNDROME: ICD-10-CM

## 2024-02-02 DIAGNOSIS — N20.0 NEPHROLITHIASIS: Primary | ICD-10-CM

## 2024-02-02 DIAGNOSIS — R47.01 APHASIA: ICD-10-CM

## 2024-02-02 DIAGNOSIS — S22.000A COMPRESSION FRACTURE OF BODY OF THORACIC VERTEBRA (HCC): ICD-10-CM

## 2024-02-02 LAB
ALBUMIN SERPL BCP-MCNC: 4.3 G/DL (ref 3.5–5)
ALP SERPL-CCNC: 146 U/L (ref 34–104)
ALT SERPL W P-5'-P-CCNC: 17 U/L (ref 7–52)
AMORPH URATE CRY URNS QL MICRO: ABNORMAL
ANION GAP SERPL CALCULATED.3IONS-SCNC: 6 MMOL/L
AST SERPL W P-5'-P-CCNC: 24 U/L (ref 13–39)
BACTERIA UR QL AUTO: ABNORMAL /HPF
BACTERIA UR QL AUTO: ABNORMAL /HPF
BASOPHILS # BLD AUTO: 0.04 THOUSANDS/ÂΜL (ref 0–0.1)
BASOPHILS NFR BLD AUTO: 1 % (ref 0–1)
BILIRUB SERPL-MCNC: 0.5 MG/DL (ref 0.2–1)
BILIRUB UR QL STRIP: NEGATIVE
BILIRUB UR QL STRIP: NEGATIVE
BUN SERPL-MCNC: 10 MG/DL (ref 5–25)
CALCIUM SERPL-MCNC: 9.4 MG/DL (ref 8.4–10.2)
CHLORIDE SERPL-SCNC: 99 MMOL/L (ref 96–108)
CLARITY UR: CLEAR
CLARITY UR: CLEAR
CO2 SERPL-SCNC: 30 MMOL/L (ref 21–32)
COLOR UR: ABNORMAL
COLOR UR: ABNORMAL
CREAT SERPL-MCNC: 0.83 MG/DL (ref 0.6–1.3)
EOSINOPHIL # BLD AUTO: 0.13 THOUSAND/ÂΜL (ref 0–0.61)
EOSINOPHIL NFR BLD AUTO: 3 % (ref 0–6)
ERYTHROCYTE [DISTWIDTH] IN BLOOD BY AUTOMATED COUNT: 12.9 % (ref 11.6–15.1)
GFR SERPL CREATININE-BSD FRML MDRD: 87 ML/MIN/1.73SQ M
GLUCOSE SERPL-MCNC: 82 MG/DL (ref 65–140)
GLUCOSE UR STRIP-MCNC: NEGATIVE MG/DL
GLUCOSE UR STRIP-MCNC: NEGATIVE MG/DL
HCT VFR BLD AUTO: 42.9 % (ref 36.5–49.3)
HGB BLD-MCNC: 14.3 G/DL (ref 12–17)
HGB UR QL STRIP.AUTO: ABNORMAL
HGB UR QL STRIP.AUTO: NEGATIVE
IMM GRANULOCYTES # BLD AUTO: 0.01 THOUSAND/UL (ref 0–0.2)
IMM GRANULOCYTES NFR BLD AUTO: 0 % (ref 0–2)
KETONES UR STRIP-MCNC: NEGATIVE MG/DL
KETONES UR STRIP-MCNC: NEGATIVE MG/DL
LEUKOCYTE ESTERASE UR QL STRIP: ABNORMAL
LEUKOCYTE ESTERASE UR QL STRIP: NEGATIVE
LIPASE SERPL-CCNC: 10 U/L (ref 11–82)
LYMPHOCYTES # BLD AUTO: 1.28 THOUSANDS/ÂΜL (ref 0.6–4.47)
LYMPHOCYTES NFR BLD AUTO: 24 % (ref 14–44)
MCH RBC QN AUTO: 31.8 PG (ref 26.8–34.3)
MCHC RBC AUTO-ENTMCNC: 33.3 G/DL (ref 31.4–37.4)
MCV RBC AUTO: 95 FL (ref 82–98)
MONOCYTES # BLD AUTO: 0.55 THOUSAND/ÂΜL (ref 0.17–1.22)
MONOCYTES NFR BLD AUTO: 10 % (ref 4–12)
MUCOUS THREADS UR QL AUTO: ABNORMAL
NEUTROPHILS # BLD AUTO: 3.29 THOUSANDS/ÂΜL (ref 1.85–7.62)
NEUTS SEG NFR BLD AUTO: 62 % (ref 43–75)
NITRITE UR QL STRIP: NEGATIVE
NITRITE UR QL STRIP: NEGATIVE
NON-SQ EPI CELLS URNS QL MICRO: ABNORMAL /HPF
NON-SQ EPI CELLS URNS QL MICRO: ABNORMAL /HPF
NRBC BLD AUTO-RTO: 0 /100 WBCS
PH UR STRIP.AUTO: 7 [PH]
PH UR STRIP.AUTO: 7.5 [PH]
PLATELET # BLD AUTO: 209 THOUSANDS/UL (ref 149–390)
PMV BLD AUTO: 9.5 FL (ref 8.9–12.7)
POTASSIUM SERPL-SCNC: 4.2 MMOL/L (ref 3.5–5.3)
PROT SERPL-MCNC: 7.2 G/DL (ref 6.4–8.4)
PROT UR STRIP-MCNC: ABNORMAL MG/DL
PROT UR STRIP-MCNC: NEGATIVE MG/DL
RBC # BLD AUTO: 4.5 MILLION/UL (ref 3.88–5.62)
RBC #/AREA URNS AUTO: ABNORMAL /HPF
RBC #/AREA URNS AUTO: ABNORMAL /HPF
SL AMB  POCT GLUCOSE, UA: NEGATIVE
SL AMB LEUKOCYTE ESTERASE,UA: NEGATIVE
SL AMB POCT BILIRUBIN,UA: NEGATIVE
SL AMB POCT BLOOD,UA: NORMAL
SL AMB POCT CLARITY,UA: CLEAR
SL AMB POCT COLOR,UA: YELLOW
SL AMB POCT KETONES,UA: NEGATIVE
SL AMB POCT NITRITE,UA: NEGATIVE
SL AMB POCT PH,UA: 8.5
SL AMB POCT SPECIFIC GRAVITY,UA: 1.01
SL AMB POCT URINE PROTEIN: NEGATIVE
SL AMB POCT UROBILINOGEN: NORMAL
SODIUM SERPL-SCNC: 135 MMOL/L (ref 135–147)
SP GR UR STRIP.AUTO: 1.01 (ref 1–1.03)
SP GR UR STRIP.AUTO: 1.01 (ref 1–1.03)
UROBILINOGEN UR STRIP-ACNC: <2 MG/DL
UROBILINOGEN UR STRIP-ACNC: <2 MG/DL
WBC # BLD AUTO: 5.3 THOUSAND/UL (ref 4.31–10.16)
WBC #/AREA URNS AUTO: ABNORMAL /HPF
WBC #/AREA URNS AUTO: ABNORMAL /HPF

## 2024-02-02 PROCEDURE — 99284 EMERGENCY DEPT VISIT MOD MDM: CPT | Performed by: EMERGENCY MEDICINE

## 2024-02-02 PROCEDURE — 81001 URINALYSIS AUTO W/SCOPE: CPT | Performed by: INTERNAL MEDICINE

## 2024-02-02 PROCEDURE — G1004 CDSM NDSC: HCPCS

## 2024-02-02 PROCEDURE — 74176 CT ABD & PELVIS W/O CONTRAST: CPT

## 2024-02-02 PROCEDURE — 36415 COLL VENOUS BLD VENIPUNCTURE: CPT

## 2024-02-02 PROCEDURE — 99284 EMERGENCY DEPT VISIT MOD MDM: CPT

## 2024-02-02 PROCEDURE — 85025 COMPLETE CBC W/AUTO DIFF WBC: CPT | Performed by: EMERGENCY MEDICINE

## 2024-02-02 PROCEDURE — 80053 COMPREHEN METABOLIC PANEL: CPT | Performed by: EMERGENCY MEDICINE

## 2024-02-02 PROCEDURE — 99215 OFFICE O/P EST HI 40 MIN: CPT | Performed by: INTERNAL MEDICINE

## 2024-02-02 PROCEDURE — 83690 ASSAY OF LIPASE: CPT | Performed by: EMERGENCY MEDICINE

## 2024-02-02 PROCEDURE — 81002 URINALYSIS NONAUTO W/O SCOPE: CPT | Performed by: INTERNAL MEDICINE

## 2024-02-02 PROCEDURE — 81001 URINALYSIS AUTO W/SCOPE: CPT | Performed by: EMERGENCY MEDICINE

## 2024-02-02 RX ORDER — TAMSULOSIN HYDROCHLORIDE 0.4 MG/1
0.4 CAPSULE ORAL
Qty: 30 CAPSULE | Refills: 0 | Status: SHIPPED | OUTPATIENT
Start: 2024-02-02 | End: 2024-02-26 | Stop reason: SDUPTHER

## 2024-02-02 RX ORDER — ONDANSETRON 4 MG/1
4 TABLET, FILM COATED ORAL EVERY 6 HOURS
Qty: 12 TABLET | Refills: 0 | Status: SHIPPED | OUTPATIENT
Start: 2024-02-02 | End: 2024-02-16

## 2024-02-02 RX ORDER — SENNOSIDES 8.6 MG
650 CAPSULE ORAL EVERY 8 HOURS PRN
Qty: 30 TABLET | Refills: 0 | Status: SHIPPED | OUTPATIENT
Start: 2024-02-02 | End: 2024-02-16

## 2024-02-02 RX ORDER — ELECTROLYTES/DEXTROSE
SOLUTION, ORAL ORAL
COMMUNITY
Start: 2024-01-12

## 2024-02-02 RX ORDER — POLYETHYLENE GLYCOL 3350 17 G/17G
17 POWDER, FOR SOLUTION ORAL DAILY
Qty: 510 G | Refills: 0 | Status: SHIPPED | OUTPATIENT
Start: 2024-02-02 | End: 2024-02-16

## 2024-02-02 RX ORDER — IBUPROFEN 400 MG/1
400 TABLET ORAL EVERY 6 HOURS PRN
Qty: 28 TABLET | Refills: 0 | Status: SHIPPED | OUTPATIENT
Start: 2024-02-02 | End: 2024-02-16

## 2024-02-02 NOTE — PROGRESS NOTES
Assessment/Plan:      Flank pain with nephrolithiasis  -Point-of-care urine dipstick was positive for trace intact blood but negative for nitrites or leukocytes.  -We will send the urine to the lab for urinalysis, microscopy and reflex culture and sensitivity and follow-up with results and prescribe antibiotics if needed  -Stat CT renal stone study was ordered which showed a 2.2 x 1.3 cm right renal pelvis stone, near the ureteropelvic junction with mild hydronephrosis and proximal ureterectasis  -Will transfer patient to the emergency department for urology evaluation and treatment.  -They have chosen to go to [facility] and we will transfer him there.    Low back pain with compression fracture  -CT scan showed T12 and L5 compression fractures  -Patient will need a DEXA scan as an outpatient  -Will continue with analgesics    Aphasia  -Wife reports that patient has elements of both expressive aphasia and receptive aphasia  -He has been seeing speech therapy but wants to go to a different speech therapist  -We will order the speech referral as requested       Diagnoses and all orders for this visit:    Nephrolithiasis  -     Transfer to other facility    Urinary frequency  -     POCT urine dip  -     PSA, Total Screen; Future  -     CT renal stone study abdomen pelvis wo contrast; Future  -     Cancel: UA w Reflex to Microscopic w Reflex to Culture  -     Cancel: UA w Reflex to Microscopic w Reflex to Culture; Future  -     UA w Reflex to Microscopic w Reflex to Culture; Future  -     UA w Reflex to Microscopic w Reflex to Culture    Acute bilateral low back pain without sciatica  -     Cancel: UA w Reflex to Microscopic w Reflex to Culture    Left flank pain  -     PSA, Total Screen; Future  -     CT renal stone study abdomen pelvis wo contrast; Future  -     Cancel: UA w Reflex to Microscopic w Reflex to Culture    Other microscopic hematuria  -     PSA, Total Screen; Future  -     CT renal stone study  abdomen pelvis wo contrast; Future  -     Cancel: UA w Reflex to Microscopic w Reflex to Culture    Lower urinary tract symptoms (LUTS)  -     PSA, Total Screen; Future  -     CT renal stone study abdomen pelvis wo contrast; Future  -     Cancel: UA w Reflex to Microscopic w Reflex to Culture    Aphasia  -     Ambulatory Referral to Speech Therapy; Future    Encounter for screening for malignant neoplasm of prostate  -     PSA, Total Screen; Future    Hydronephrosis with ureteropelvic junction (UPJ) obstruction  -     Transfer to other facility    Other orders  -     Pyridoxine HCl (Vitamin B6) 100 MG TABS          Subjective:      Patient ID: Jose Raza is a 73 y.o. male.    HPI    Patient presents with his wife with complaints of problems with urination.  Of note, most of the history is from patient's wife because patient has a history of aphasia and mild cognitive impairment.  He does admit to nocturia x 2 usually and then last night he had nocturia 5 times and with hesitancy and had to strain to pass the urine once.  He admits to decreased quantity of urine and chills last night but denies dysuria, hematuria, abdominal pain, nausea, vomiting and fever.  His wife is very concerned because he has never had issues with peeing in the past.    ? Family hx of bph in dad   Has never had kidney stones in his life  Drinks about 72 oz of water and decaf tea   Uses to drink coffee all the time but now drinks only decaf  He also states that he has having problems with his blood pressure.  Wife reports that they checked his blood pressure and got 135/70+ at home yesterday and she was concerned because she believes his bp should be in the 120s and blood pressure has usually been lower in the past.  He also has also been having low back pain for a month   He shoveled snow a while ago and a few days later his back started hurting and did not get better  He was seen by his PCP and PT was recommended and he was given a  brace.  Has been having PT and has been doing exercises and reports that his pain has been getting better but is still not gone   Back pain is now 5/10, and he denies any radiation down the legs or to the groin, and denies any hematuria.  No family hx of htn         The following portions of the patient's history were reviewed and updated as appropriate: He  has no past medical history on file.  He   Patient Active Problem List    Diagnosis Date Noted   • Acute left-sided low back pain without sciatica 01/22/2024   • Cognitive impairment 08/23/2023   • Chronic fatigue syndrome 08/23/2023   • Nail abnormality 08/23/2023   • Iron deficiency anemia 08/23/2023     He  has a past surgical history that includes Hernia repair.  His family history includes Aneurysm in his father; Brain cancer in his mother.  He  reports that he quit smoking about 51 years ago. His smoking use included cigarettes. He started smoking about 52 years ago. He has never used smokeless tobacco. He reports that he does not currently use alcohol. He reports that he does not use drugs.  Current Outpatient Medications   Medication Sig Dispense Refill   • cycloSPORINE (Restasis) 0.05 % ophthalmic emulsion Administer 1 drop to both eyes every 12 (twelve) hours     • MAGNESIUM CARBONATE PO Take 1 tablet by mouth in the morning     • NON FORMULARY Triple magnesium (glycinate 10%, oxide 60%, malate 30%,)     • NON FORMULARY Pycnogenol supplement     • Omega-3 Fatty Acids (fish oil) 1,000 mg Take 2,000 mg by mouth daily     • Pyridoxine HCl (Vitamin B6) 100 MG TABS        No current facility-administered medications for this visit.     Current Outpatient Medications on File Prior to Visit   Medication Sig   • cycloSPORINE (Restasis) 0.05 % ophthalmic emulsion Administer 1 drop to both eyes every 12 (twelve) hours   • MAGNESIUM CARBONATE PO Take 1 tablet by mouth in the morning   • NON FORMULARY Triple magnesium (glycinate 10%, oxide 60%, malate 30%,)   •  NON FORMULARY Pycnogenol supplement   • Omega-3 Fatty Acids (fish oil) 1,000 mg Take 2,000 mg by mouth daily   • Pyridoxine HCl (Vitamin B6) 100 MG TABS    • [DISCONTINUED] Ascorbic Acid (vitamin C) 1000 MG tablet Take 1 tablet by mouth in the morning   • [DISCONTINUED] naproxen (Naprosyn) 500 mg tablet Take 1 tablet (500 mg total) by mouth 2 (two) times a day with meals   • [DISCONTINUED] Pediatric Multivitamins-Fl (MULT-VITAMIN/FLUORIDE PO) Take 1 tablet by mouth in the morning     No current facility-administered medications on file prior to visit.     He has No Known Allergies..    Review of Systems   Constitutional:  Positive for chills (last night). Negative for activity change, fatigue, fever and unexpected weight change.   HENT:  Positive for postnasal drip and sore throat (for a couple of months). Negative for ear pain, rhinorrhea and sinus pressure.    Eyes:  Negative for pain.   Respiratory:  Negative for cough, choking, chest tightness, shortness of breath and wheezing.    Cardiovascular:  Negative for chest pain, palpitations and leg swelling.   Gastrointestinal:  Negative for abdominal pain, constipation, diarrhea, nausea and vomiting.        Heart burn and was on ppi for years - has Barrets esophagus    Genitourinary:  Positive for decreased urine volume, difficulty urinating, flank pain and frequency (Nocturia x 5 yesterday with straining on 1 occasion). Negative for dysuria, hematuria and urgency.   Musculoskeletal:  Positive for back pain. Negative for arthralgias, gait problem, joint swelling, myalgias and neck stiffness.   Skin:  Negative for pallor and rash.   Neurological:  Negative for dizziness, tremors, seizures, syncope, light-headedness and headaches.   Hematological:  Negative for adenopathy.   Psychiatric/Behavioral:  Negative for behavioral problems.          Objective:      /90 (BP Location: Left arm, Patient Position: Sitting, Cuff Size: Standard)   Pulse (!) 54   Temp 98 °F  "(36.7 °C) (Temporal)   Ht 5' 2.91\" (1.598 m)   Wt 71.2 kg (157 lb)   SpO2 96%   BMI 27.89 kg/m²          Physical Exam  Constitutional:       General: He is not in acute distress.     Appearance: He is well-developed. He is not diaphoretic.   HENT:      Head: Normocephalic and atraumatic.      Right Ear: External ear normal.      Left Ear: External ear normal.      Nose: Nose normal.      Mouth/Throat:      Mouth: Mucous membranes are dry.      Pharynx: Posterior oropharyngeal erythema (with signs of pnd) present. No oropharyngeal exudate.   Eyes:      General: No scleral icterus.        Right eye: No discharge.         Left eye: No discharge.      Conjunctiva/sclera: Conjunctivae normal.      Pupils: Pupils are equal, round, and reactive to light.   Neck:      Thyroid: No thyromegaly.      Vascular: No JVD.      Trachea: No tracheal deviation.   Cardiovascular:      Rate and Rhythm: Normal rate and regular rhythm.      Heart sounds: Normal heart sounds. No murmur heard.     No friction rub. No gallop.   Pulmonary:      Effort: Pulmonary effort is normal. No respiratory distress.      Breath sounds: Normal breath sounds. No wheezing or rales.   Chest:      Chest wall: No tenderness.   Abdominal:      General: Bowel sounds are normal. There is no distension.      Palpations: Abdomen is soft. There is no mass.      Tenderness: There is no abdominal tenderness. There is no guarding or rebound.   Musculoskeletal:         General: No tenderness or deformity. Normal range of motion.      Cervical back: Normal range of motion and neck supple.      Lumbar back: No tenderness or bony tenderness. Negative right straight leg raise test and negative left straight leg raise test.   Lymphadenopathy:      Cervical: No cervical adenopathy.   Skin:     General: Skin is warm and dry.      Coloration: Skin is not pale.      Findings: No erythema or rash.   Neurological:      Mental Status: He is alert and oriented to person, " place, and time.      Cranial Nerves: No cranial nerve deficit.      Motor: No abnormal muscle tone.      Coordination: Coordination normal.      Deep Tendon Reflexes: Reflexes are normal and symmetric.   Psychiatric:         Behavior: Behavior normal.           Office Visit on 02/02/2024   Component Date Value Ref Range Status   • LEUKOCYTE ESTERASE,UA 02/02/2024 negative   Final   • NITRITE,UA 02/02/2024 negative   Final   • SL AMB POCT UROBILINOGEN 02/02/2024 0.2 E.U/dL   Final   • POCT URINE PROTEIN 02/02/2024 negative   Final   •  PH,UA 02/02/2024 8.5   Final   • BLOOD,UA 02/02/2024 Trace-intact   Final   • SPECIFIC GRAVITY,UA 02/02/2024 1.015   Final   • KETONES,UA 02/02/2024 negative   Final   • BILIRUBIN,UA 02/02/2024 negative   Final   • GLUCOSE, UA 02/02/2024 negative   Final   •  COLOR,UA 02/02/2024 yellow   Final   • CLARITY,UA 02/02/2024 clear   Final

## 2024-02-03 ENCOUNTER — TELEPHONE (OUTPATIENT)
Dept: OTHER | Facility: HOSPITAL | Age: 74
End: 2024-02-03

## 2024-02-03 NOTE — TELEPHONE ENCOUNTER
Patient is a 73-year-old male presenting to the emergency room with increasing difficulty with urination.  Also following with primary care physician.  Patient noted to be with urinary retention 400 cc.  Difficult additional PVRs revealing 541.  Discussed with ED placement of Dobbs catheter and void trial on outpatient initiation of Flomax.  CT scan also revealed incidental finding of large calculus in right renal pelvis near UPJ measuring 2.2 x 1.2 cm with mild hydronephrosis.  However patient was asymptomatic with little to no pain.  No MARKIE no leukocytosis UA negative for infection afebrile hemodynamically stable.  Also had moderate compression fraction of T12 and L5.  No concern for cauda equina syndrome per ED.     Please schedule close follow-up for void trial and for outpatient arrangement for ureteroscopy.

## 2024-02-03 NOTE — ED PROVIDER NOTES
History  Chief Complaint   Patient presents with   • Flank Pain     Left flank pain for 5 weeks.  Had a ct showing rt kidney stone and compression fx.  Sent in to see urology. Difficulty urinating     KIARA caceres is aj 73 y.o. male presenting for L flank pain. Patient states that last night he was having chills and was unable to urinate. Today he strained to urinate and was able to, but only with difficulty     Patient saw his PCP today who ordered a CT. CT showed a 2.2.x1.3cm calculus at the R UPJ. Patient also has moderate compression fractures of T12 and L5. Patient states that he thought these were a pulled muscle from shoveling snow and was surprised by this imaging finding.     Patient had CT 2/2/24 that showed stone at R UPJ     400 mL after urination on bladder scan    Chills last night   Prior to Admission Medications   Prescriptions Last Dose Informant Patient Reported? Taking?   MAGNESIUM CARBONATE PO  Self Yes No   Sig: Take 1 tablet by mouth in the morning   NON FORMULARY  Self Yes No   Sig: Triple magnesium (glycinate 10%, oxide 60%, malate 30%,)   NON FORMULARY  Self Yes No   Sig: Pycnogenol supplement   Omega-3 Fatty Acids (fish oil) 1,000 mg  Self Yes No   Sig: Take 2,000 mg by mouth daily   Pyridoxine HCl (Vitamin B6) 100 MG TABS  Self Yes No   cycloSPORINE (Restasis) 0.05 % ophthalmic emulsion  Self Yes No   Sig: Administer 1 drop to both eyes every 12 (twelve) hours      Facility-Administered Medications: None       History reviewed. No pertinent past medical history.    Past Surgical History:   Procedure Laterality Date   • HERNIA REPAIR         Family History   Problem Relation Age of Onset   • Brain cancer Mother    • Aneurysm Father      I have reviewed and agree with the history as documented.    E-Cigarette/Vaping   • E-Cigarette Use Never User      E-Cigarette/Vaping Substances     Social History     Tobacco Use   • Smoking status: Former     Current packs/day: 0.00     Types: Cigarettes      Start date:      Quit date:      Years since quittin.1   • Smokeless tobacco: Never   • Tobacco comments:     Social    Vaping Use   • Vaping status: Never Used   Substance Use Topics   • Alcohol use: Not Currently   • Drug use: Never        Review of Systems    Physical Exam  ED Triage Vitals [24 1820]   Temperature Pulse Respirations Blood Pressure SpO2   97.8 °F (36.6 °C) 55 18 144/82 100 %      Temp Source Heart Rate Source Patient Position - Orthostatic VS BP Location FiO2 (%)   Temporal Monitor -- -- --      Pain Score       No Pain             Orthostatic Vital Signs  Vitals:    24 1820   BP: 144/82   Pulse: 55       Physical Exam    ED Medications  Medications - No data to display    Diagnostic Studies  Results Reviewed       Procedure Component Value Units Date/Time    UA w Reflex to Microscopic w Reflex to Culture [[id number]] Collected: 24    Lab Status: No result Specimen: Urine, Clean Catch     Comprehensive metabolic panel [[id number]]  (Abnormal) Collected: 24    Lab Status: Final result Specimen: Blood from Arm, Left Updated: 24     Sodium 135 mmol/L      Potassium 4.2 mmol/L      Chloride 99 mmol/L      CO2 30 mmol/L      ANION GAP 6 mmol/L      BUN 10 mg/dL      Creatinine 0.83 mg/dL      Glucose 82 mg/dL      Calcium 9.4 mg/dL      AST 24 U/L      ALT 17 U/L      Alkaline Phosphatase 146 U/L      Total Protein 7.2 g/dL      Albumin 4.3 g/dL      Total Bilirubin 0.50 mg/dL      eGFR 87 ml/min/1.73sq m     Narrative:      National Kidney Disease Foundation guidelines for Chronic Kidney Disease (CKD):   •  Stage 1 with normal or high GFR (GFR > 90 mL/min/1.73 square meters)  •  Stage 2 Mild CKD (GFR = 60-89 mL/min/1.73 square meters)  •  Stage 3A Moderate CKD (GFR = 45-59 mL/min/1.73 square meters)  •  Stage 3B Moderate CKD (GFR = 30-44 mL/min/1.73 square meters)  •  Stage 4 Severe CKD (GFR = 15-29 mL/min/1.73 square meters)  •  Stage 5 End  Stage CKD (GFR <15 mL/min/1.73 square meters)  Note: GFR calculation is accurate only with a steady state creatinine    Lipase [[id number]]  (Abnormal) Collected: 02/02/24 1827    Lab Status: Final result Specimen: Blood from Arm, Left Updated: 02/02/24 1906     Lipase 10 u/L     CBC and differential [[id number]] Collected: 02/02/24 1827    Lab Status: Final result Specimen: Blood from Arm, Left Updated: 02/02/24 1853     WBC 5.30 Thousand/uL      RBC 4.50 Million/uL      Hemoglobin 14.3 g/dL      Hematocrit 42.9 %      MCV 95 fL      MCH 31.8 pg      MCHC 33.3 g/dL      RDW 12.9 %      MPV 9.5 fL      Platelets 209 Thousands/uL      nRBC 0 /100 WBCs      Neutrophils Relative 62 %      Immat GRANS % 0 %      Lymphocytes Relative 24 %      Monocytes Relative 10 %      Eosinophils Relative 3 %      Basophils Relative 1 %      Neutrophils Absolute 3.29 Thousands/µL      Immature Grans Absolute 0.01 Thousand/uL      Lymphocytes Absolute 1.28 Thousands/µL      Monocytes Absolute 0.55 Thousand/µL      Eosinophils Absolute 0.13 Thousand/µL      Basophils Absolute 0.04 Thousands/µL                    No orders to display         Procedures  Procedures      ED Course                             SBIRT 22yo+      Flowsheet Row Most Recent Value   Initial Alcohol Screen: US AUDIT-C     1. How often do you have a drink containing alcohol? 0 Filed at: 02/02/2024 1821   2. How many drinks containing alcohol do you have on a typical day you are drinking?  0 Filed at: 02/02/2024 1821   3a. Male UNDER 65: How often do you have five or more drinks on one occasion? 0 Filed at: 02/02/2024 1821   3b. FEMALE Any Age, or MALE 65+: How often do you have 4 or more drinks on one occassion? 0 Filed at: 02/02/2024 1821   Audit-C Score 0 Filed at: 02/02/2024 1821   SHERITA: How many times in the past year have you...    Used an illegal drug or used a prescription medication for non-medical reasons? Never Filed at: 02/02/2024 1821                   Medical Decision Making      Disposition  Final diagnoses:   None     ED Disposition       None          Follow-up Information    None         Patient's Medications   Discharge Prescriptions    No medications on file     No discharge procedures on file.    PDMP Review       None             ED Provider  Attending physically available and evaluated Jose Raza. I managed the patient along with the ED Attending.    Electronically Signed by         of discharge with Dobbs cath for urinary retention with patient. Patient to discuss with relative and decide soon. Will do repeat bladder scan to see if still retaining since he did just urinate again now.    2327 Patient agreed to Dobbs cath and discharge with urology follow up                              SBIRT 20yo+      Flowsheet Row Most Recent Value   Initial Alcohol Screen: US AUDIT-C     1. How often do you have a drink containing alcohol? 0 Filed at: 02/02/2024 1821   2. How many drinks containing alcohol do you have on a typical day you are drinking?  0 Filed at: 02/02/2024 1821   3a. Male UNDER 65: How often do you have five or more drinks on one occasion? 0 Filed at: 02/02/2024 1821   3b. FEMALE Any Age, or MALE 65+: How often do you have 4 or more drinks on one occassion? 0 Filed at: 02/02/2024 1821   Audit-C Score 0 Filed at: 02/02/2024 1821   SHERITA: How many times in the past year have you...    Used an illegal drug or used a prescription medication for non-medical reasons? Never Filed at: 02/02/2024 1821                  Medical Decision Making  Amount and/or Complexity of Data Reviewed  Labs: ordered. Decision-making details documented in ED Course.    Risk  OTC drugs.  Prescription drug management.        Patient is a 73 y.o. male  who presents to the ED with concern for stone on CT; urinary retention.    Vital signs stable, afebrile. Exam as listed above    Differential diagnosis includes but is not limited to Non-obstructing R calculus at UPJ with mild hydronephrosis, BPH causes urinary retention, given no spinal cord compression on review of CT, unlikely that urinary retention is cause of cord compression. No other sxs of cauda equina.  R/o infected stone    Plan UA with reflex, CBC, CMP, lipase. Patient declines anything for pain as not currently experiencing pain. Will reach out to urology but suspect patient appropriate for outpatient fu.     View ED course above for further discussion on  patient workup.     All labs reviewed and utilized in the medical decision making process  All radiology studies independently viewed by me and interpreted by the radiologist.  I reviewed all testing with the patient.     Upon re-evaluation Patient urinating but with difficulty. Menendez catheter placed for urinary retention. All labs unremarkable with no evidence of UTI. Patient discharged with menendez with plan to follow up with urology for urinary retention and possibly schedule lithotripsy/stent/retrieval       Disposition  Final diagnoses:   Urinary retention   Hydronephrosis with obstructing calculus   Compression fracture of body of thoracic vertebra (HCC)   Compression fracture of lumbar spine, non-traumatic (HCC)     Time reflects when diagnosis was documented in both MDM as applicable and the Disposition within this note       Time User Action Codes Description Comment    2/2/2024 11:20 PM MarandaKristincy Add [R33.9] Urinary retention     2/2/2024 11:30 PM Maranda, Jaclyn Add [N20.0] Nephrolithiasis     2/2/2024 11:30 PM Maranda, Jaclyn Add [N13.2] Hydronephrosis with obstructing calculus     2/2/2024 11:30 PM Maranda, Jaclyn Remove [N20.0] Nephrolithiasis     2/2/2024 11:30 PM Maranda, Jaclyn Add [S22.000A] Compression fracture of body of thoracic vertebra (HCC)     2/2/2024 11:30 PM MarandaKristincy Add [M48.56XA] Compression fracture of lumbar spine, non-traumatic (HCC)           ED Disposition       ED Disposition   Discharge    Condition   Stable    Date/Time   Fri Feb 2, 2024 2320    Comment   Jose Raza discharge to home/self care.                   Follow-up Information       Follow up With Specialties Details Why Contact Info Additional Information    [facility] Follow up  [address]  [facility] [address]  [phone] [facility], [address], [place], [place], [address]   [phone]            Discharge  Medication List as of 2/2/2024 11:35 PM        START taking these medications    Details   acetaminophen (TYLENOL) 650 mg CR tablet Take 1 tablet (650 mg total) by mouth every 8 (eight) hours as needed for mild pain, Starting Fri 2/2/2024, Normal      ibuprofen (MOTRIN) 400 mg tablet Take 1 tablet (400 mg total) by mouth every 6 (six) hours as needed for mild pain for up to 7 days, Starting Fri 2/2/2024, Until Fri 2/9/2024 at 2359, Normal      ondansetron (ZOFRAN) 4 mg tablet Take 1 tablet (4 mg total) by mouth every 6 (six) hours for 14 days, Starting Fri 2/2/2024, Until Fri 2/16/2024, Normal      polyethylene glycol (GLYCOLAX) 17 GM/SCOOP powder Take 17 g by mouth daily, Starting Fri 2/2/2024, Until Sun 3/3/2024, Normal      tamsulosin (FLOMAX) 0.4 mg Take 1 capsule (0.4 mg total) by mouth daily with dinner, Starting Fri 2/2/2024, Until Sun 3/3/2024, Normal           CONTINUE these medications which have NOT CHANGED    Details   cycloSPORINE (Restasis) 0.05 % ophthalmic emulsion Administer 1 drop to both eyes every 12 (twelve) hours, Starting Fri 4/16/2021, Historical Med      MAGNESIUM CARBONATE PO Take 1 tablet by mouth in the morning, Historical Med      !! NON FORMULARY Triple magnesium (glycinate 10%, oxide 60%, malate 30%,), Historical Med      !! NON FORMULARY Pycnogenol supplement, Historical Med      Omega-3 Fatty Acids (fish oil) 1,000 mg Take 2,000 mg by mouth daily, Historical Med      Pyridoxine HCl (Vitamin B6) 100 MG TABS Historical Med       !! - Potential duplicate medications found. Please discuss with provider.            PDMP Review       None             ED Provider  Attending physically available and evaluated Jose Raza. I managed the patient along with the ED Attending.    Electronically Signed by           Jaclyn Low MD  02/06/24 1926

## 2024-02-03 NOTE — TELEPHONE ENCOUNTER
Wife states that they are in the ER at [facility] after speaking to patient's PCP who told him to go there to be seen.

## 2024-02-03 NOTE — TELEPHONE ENCOUNTER
"Regarding: questions regarding kidney stones  ----- Message from Vanessa Mayer sent at 2/2/2024  5:05 PM EST -----  Pt wife called \"My 's imaging results came back and it looks like he has a fairly large kidney stone that he will not be able to pass. I would like to know what we can do over the weekend if he does begin to pass them.\"    "

## 2024-02-03 NOTE — DISCHARGE INSTRUCTIONS
You were seen today for a R sided kidney stone and urinary retention. You have been given a urology referral for continued follow up.     There are instructions for Menendez catheter care at the back of this packet     Please return to the ER if you have fevers, chills, nausea, vomiting, worsening pain, or you are not producing urine via the menendez catheter.     Please drink plenty of water to avoid further stone burden.

## 2024-02-03 NOTE — TELEPHONE ENCOUNTER
Reason for Disposition  • Caller has already spoken with the PCP and has no further questions.    Additional Information  • Caller has already spoken to PCP or another triager    Protocols used: Information Only Call - No Triage-ADULT-AH, No Contact or Duplicate Contact Call-ADULT-AH

## 2024-02-04 ENCOUNTER — NURSE TRIAGE (OUTPATIENT)
Dept: OTHER | Facility: OTHER | Age: 74
End: 2024-02-04

## 2024-02-04 NOTE — TELEPHONE ENCOUNTER
"Answer Assessment - Initial Assessment Questions  1. DESCRIPTION: \"Describe your dizziness.\"      Right now, not dizzy or lightheaded but did earlier    2. LIGHTHEADED: \"Do you feel lightheaded?\" (e.g., somewhat faint, woozy, weak upon standing)      Felt weak upon standing before leaning on to wife    3. VERTIGO: \"Do you feel like either you or the room is spinning or tilting?\" (i.e. vertigo)      Denies    4. SEVERITY: \"How bad is it?\"  \"Do you feel like you are going to faint?\" \"Can you stand and walk?\"    - MILD: Feels slightly dizzy, but walking normally.    - MODERATE: Feels very unsteady when walking, but not falling; interferes with normal activities (e.g., school, work) .    - SEVERE: Unable to walk without falling, or requires assistance to walk without falling; feels like passing out now.       Right now feels fine. Earlier, felt like passing out    5. ONSET:  \"When did the dizziness begin?\"      7:00 AM    6. AGGRAVATING FACTORS: \"Does anything make it worse?\" (e.g., standing, change in head position)      No    7. HEART RATE: \"Can you tell me your heart rate?\" \"How many beats in 15 seconds?\"  (Note: not all patients can do this)        55 and 56    8. CAUSE: \"What do you think is causing the dizziness?\"      Unsure    9. RECURRENT SYMPTOM: \"Have you had dizziness before?\" If Yes, ask: \"When was the last time?\" \"What happened that time?\"      Sometimes    10. OTHER SYMPTOMS: \"Do you have any other symptoms?\" (e.g., fever, chest pain, vomiting, diarrhea, bleeding)        No other symptoms    Protocols used: Dizziness - Lightheadedness-ADULT-      Advised patient's wife of on call provider's recommendations. Wife verbalized understanding.     Please follow up with wife tomorrow, Monday 2/5.  "

## 2024-02-04 NOTE — TELEPHONE ENCOUNTER
"Regarding: high bp/ passing out / fainting  ----- Message from Rhonda Hooker sent at 2/4/2024  7:48 AM EST -----  \"On Friday my  was diagnosed with kidney stones. They applied a catheter and today while emptying it he was passing out and his body was shaking. /67 Pulse is at 56.\"    ----- Message from Rhonda Hooker sent at 2/4/2024  7:48 AM EST -----  \"On Friday my  was diagnosed with kidney stones. They applied a catheter and today while emptying it he was passing out and his body was shaking. /67 Pulse is at 56.\"    "

## 2024-02-05 ENCOUNTER — APPOINTMENT (EMERGENCY)
Dept: RADIOLOGY | Facility: HOSPITAL | Age: 74
End: 2024-02-05
Payer: MEDICARE

## 2024-02-05 ENCOUNTER — TELEPHONE (OUTPATIENT)
Dept: OTHER | Facility: HOSPITAL | Age: 74
End: 2024-02-05

## 2024-02-05 ENCOUNTER — APPOINTMENT (OUTPATIENT)
Dept: PHYSICAL THERAPY | Age: 74
End: 2024-02-05
Payer: MEDICARE

## 2024-02-05 ENCOUNTER — HOSPITAL ENCOUNTER (EMERGENCY)
Facility: HOSPITAL | Age: 74
Discharge: HOME | End: 2024-02-05
Attending: EMERGENCY MEDICINE
Payer: MEDICARE

## 2024-02-05 VITALS
HEART RATE: 67 BPM | OXYGEN SATURATION: 97 % | RESPIRATION RATE: 15 BRPM | TEMPERATURE: 97.2 F | SYSTOLIC BLOOD PRESSURE: 118 MMHG | DIASTOLIC BLOOD PRESSURE: 73 MMHG

## 2024-02-05 DIAGNOSIS — R31.29 MICROSCOPIC HEMATURIA: ICD-10-CM

## 2024-02-05 DIAGNOSIS — N20.0 CALCULUS OF RENAL PELVIS: ICD-10-CM

## 2024-02-05 DIAGNOSIS — T83.9XXA FOLEY CATHETER PROBLEM, INITIAL ENCOUNTER (HCC): Primary | ICD-10-CM

## 2024-02-05 PROBLEM — K59.09 OTHER CONSTIPATION: Status: ACTIVE | Noted: 2024-02-05

## 2024-02-05 PROBLEM — R33.9 URINARY RETENTION: Status: ACTIVE | Noted: 2024-02-05

## 2024-02-05 LAB
ANION GAP SERPL CALCULATED.3IONS-SCNC: 6 MMOL/L
BACTERIA UR QL AUTO: ABNORMAL /HPF
BASOPHILS # BLD AUTO: 0.04 THOUSANDS/ÂΜL (ref 0–0.1)
BASOPHILS NFR BLD AUTO: 1 % (ref 0–1)
BILIRUB UR QL STRIP: NEGATIVE
BUN SERPL-MCNC: 14 MG/DL (ref 5–25)
CALCIUM SERPL-MCNC: 9.1 MG/DL (ref 8.4–10.2)
CHLORIDE SERPL-SCNC: 99 MMOL/L (ref 96–108)
CLARITY UR: ABNORMAL
CO2 SERPL-SCNC: 30 MMOL/L (ref 21–32)
COLOR UR: YELLOW
CREAT SERPL-MCNC: 0.69 MG/DL (ref 0.6–1.3)
EOSINOPHIL # BLD AUTO: 0.06 THOUSAND/ÂΜL (ref 0–0.61)
EOSINOPHIL NFR BLD AUTO: 1 % (ref 0–6)
ERYTHROCYTE [DISTWIDTH] IN BLOOD BY AUTOMATED COUNT: 13 % (ref 11.6–15.1)
GFR SERPL CREATININE-BSD FRML MDRD: 94 ML/MIN/1.73SQ M
GLUCOSE SERPL-MCNC: 91 MG/DL (ref 65–140)
GLUCOSE UR STRIP-MCNC: NEGATIVE MG/DL
HCT VFR BLD AUTO: 41.2 % (ref 36.5–49.3)
HGB BLD-MCNC: 13.4 G/DL (ref 12–17)
HGB UR QL STRIP.AUTO: ABNORMAL
HYALINE CASTS #/AREA URNS LPF: ABNORMAL /LPF
IMM GRANULOCYTES # BLD AUTO: 0.01 THOUSAND/UL (ref 0–0.2)
IMM GRANULOCYTES NFR BLD AUTO: 0 % (ref 0–2)
KETONES UR STRIP-MCNC: NEGATIVE MG/DL
LEUKOCYTE ESTERASE UR QL STRIP: ABNORMAL
LYMPHOCYTES # BLD AUTO: 1.07 THOUSANDS/ÂΜL (ref 0.6–4.47)
LYMPHOCYTES NFR BLD AUTO: 17 % (ref 14–44)
MCH RBC QN AUTO: 31.2 PG (ref 26.8–34.3)
MCHC RBC AUTO-ENTMCNC: 32.5 G/DL (ref 31.4–37.4)
MCV RBC AUTO: 96 FL (ref 82–98)
MONOCYTES # BLD AUTO: 0.65 THOUSAND/ÂΜL (ref 0.17–1.22)
MONOCYTES NFR BLD AUTO: 10 % (ref 4–12)
MUCOUS THREADS UR QL AUTO: ABNORMAL
NEUTROPHILS # BLD AUTO: 4.57 THOUSANDS/ÂΜL (ref 1.85–7.62)
NEUTS SEG NFR BLD AUTO: 71 % (ref 43–75)
NITRITE UR QL STRIP: NEGATIVE
NON-SQ EPI CELLS URNS QL MICRO: ABNORMAL /HPF
NRBC BLD AUTO-RTO: 0 /100 WBCS
PH UR STRIP.AUTO: 7.5 [PH]
PLATELET # BLD AUTO: 186 THOUSANDS/UL (ref 149–390)
PMV BLD AUTO: 9.4 FL (ref 8.9–12.7)
POTASSIUM SERPL-SCNC: 4.2 MMOL/L (ref 3.5–5.3)
PROT UR STRIP-MCNC: ABNORMAL MG/DL
RBC # BLD AUTO: 4.29 MILLION/UL (ref 3.88–5.62)
RBC #/AREA URNS AUTO: ABNORMAL /HPF
SODIUM SERPL-SCNC: 135 MMOL/L (ref 135–147)
SP GR UR STRIP.AUTO: 1.02 (ref 1–1.03)
UROBILINOGEN UR STRIP-ACNC: <2 MG/DL
WBC # BLD AUTO: 6.4 THOUSAND/UL (ref 4.31–10.16)
WBC #/AREA URNS AUTO: ABNORMAL /HPF

## 2024-02-05 PROCEDURE — 36415 COLL VENOUS BLD VENIPUNCTURE: CPT | Performed by: PHYSICIAN ASSISTANT

## 2024-02-05 PROCEDURE — 85025 COMPLETE CBC W/AUTO DIFF WBC: CPT | Performed by: PHYSICIAN ASSISTANT

## 2024-02-05 PROCEDURE — 99284 EMERGENCY DEPT VISIT MOD MDM: CPT

## 2024-02-05 PROCEDURE — 99284 EMERGENCY DEPT VISIT MOD MDM: CPT | Performed by: UROLOGY

## 2024-02-05 PROCEDURE — 99284 EMERGENCY DEPT VISIT MOD MDM: CPT | Performed by: PHYSICIAN ASSISTANT

## 2024-02-05 PROCEDURE — 80048 BASIC METABOLIC PNL TOTAL CA: CPT | Performed by: PHYSICIAN ASSISTANT

## 2024-02-05 PROCEDURE — 81001 URINALYSIS AUTO W/SCOPE: CPT | Performed by: PHYSICIAN ASSISTANT

## 2024-02-05 PROCEDURE — 74018 RADEX ABDOMEN 1 VIEW: CPT

## 2024-02-05 NOTE — ED PROVIDER NOTES
History  Chief Complaint   Patient presents with    Medical Problem     Menendez blocked. No urine produced between 8:30am and now. Pt states that noticed clot this morning. Pt states no pain or distention     Patient is a 74 y/o male with a PMHx of kidney stones and urinary retention (s/p menendez placement 2/2/24), presenting to the ED for evaluation of a menendez catheter malfunction.  Patient was seen in the ED 3 days ago (2/2/24) for left-sided flank/low back pain and difficulty urinating with an outpatient CT showing a 2.2 x 1.3 cm calculus in the right renal pelvis near the UPJ resulting in mild hydronephrosis as well as new compression fractures of T12 and L5. He was found to be in urinary retention with a PVR of 400 mL and a Menendez catheter was placed during this visit.  Patient's wife states that she emptied the bag this morning around 8:30 AM and noticed that there was a blood clot in the back as well as slightly pink-tinged urine. She states that there has been no urine output since that time. Patient denies any fevers, chills, nausea/vomiting, abdominal pain, abdominal distension, flank pain or dysuria. Patient denies any new/worsening low back pain, numbness/weakness in lower extremities, incontinence of stool or saddle anesthesia.        Prior to Admission Medications   Prescriptions Last Dose Informant Patient Reported? Taking?   MAGNESIUM CARBONATE PO  Self Yes No   Sig: Take 1 tablet by mouth in the morning   NON FORMULARY  Self Yes No   Sig: Triple magnesium (glycinate 10%, oxide 60%, malate 30%,)   NON FORMULARY  Self Yes No   Sig: Pycnogenol supplement   Omega-3 Fatty Acids (fish oil) 1,000 mg  Self Yes No   Sig: Take 2,000 mg by mouth daily   Pyridoxine HCl (Vitamin B6) 100 MG TABS  Self Yes No   acetaminophen (TYLENOL) 650 mg CR tablet   No No   Sig: Take 1 tablet (650 mg total) by mouth every 8 (eight) hours as needed for mild pain   cycloSPORINE (Restasis) 0.05 % ophthalmic emulsion  Self Yes No    Sig: Administer 1 drop to both eyes every 12 (twelve) hours   ibuprofen (MOTRIN) 400 mg tablet   No No   Sig: Take 1 tablet (400 mg total) by mouth every 6 (six) hours as needed for mild pain for up to 7 days   ondansetron (ZOFRAN) 4 mg tablet   No No   Sig: Take 1 tablet (4 mg total) by mouth every 6 (six) hours for 14 days   polyethylene glycol (GLYCOLAX) 17 GM/SCOOP powder   No No   Sig: Take 17 g by mouth daily   tamsulosin (FLOMAX) 0.4 mg   No No   Sig: Take 1 capsule (0.4 mg total) by mouth daily with dinner      Facility-Administered Medications: None       No past medical history on file.    Past Surgical History:   Procedure Laterality Date    HERNIA REPAIR         Family History   Problem Relation Age of Onset    Brain cancer Mother     Aneurysm Father      I have reviewed and agree with the history as documented.    E-Cigarette/Vaping    E-Cigarette Use Never User      E-Cigarette/Vaping Substances     Social History     Tobacco Use    Smoking status: Former     Current packs/day: 0.00     Types: Cigarettes     Start date:      Quit date:      Years since quittin.1    Smokeless tobacco: Never    Tobacco comments:     Social    Vaping Use    Vaping status: Never Used   Substance Use Topics    Alcohol use: Not Currently    Drug use: Never       Review of Systems   Constitutional:  Negative for chills and fever.   HENT:  Negative for ear pain and sore throat.    Eyes:  Negative for pain and visual disturbance.   Respiratory:  Negative for cough and shortness of breath.    Cardiovascular:  Negative for chest pain and palpitations.   Gastrointestinal:  Negative for abdominal pain, constipation, diarrhea, nausea and vomiting.   Genitourinary:  Positive for decreased urine volume. Negative for dysuria, flank pain, hematuria and penile pain.   Musculoskeletal:  Positive for back pain. Negative for arthralgias, myalgias and neck pain.   Skin:  Negative for rash.   Neurological:  Negative for  seizures and syncope.   All other systems reviewed and are negative.      Physical Exam  Physical Exam  Vitals and nursing note reviewed.   Constitutional:       General: He is awake. He is not in acute distress.     Appearance: Normal appearance. He is well-developed. He is not ill-appearing or diaphoretic.   HENT:      Head: Normocephalic and atraumatic.      Right Ear: External ear normal.      Left Ear: External ear normal.      Nose: Nose normal.      Mouth/Throat:      Lips: Pink.      Mouth: Mucous membranes are moist.   Eyes:      General: Lids are normal. No scleral icterus.     Conjunctiva/sclera: Conjunctivae normal.      Pupils: Pupils are equal, round, and reactive to light.   Cardiovascular:      Rate and Rhythm: Normal rate and regular rhythm.      Pulses: Normal pulses.           Radial pulses are 2+ on the right side and 2+ on the left side.      Heart sounds: Normal heart sounds, S1 normal and S2 normal.   Pulmonary:      Effort: Pulmonary effort is normal. No accessory muscle usage.      Breath sounds: Normal breath sounds. No stridor. No decreased breath sounds, wheezing, rhonchi or rales.   Abdominal:      General: Abdomen is flat. Bowel sounds are normal. There is no distension.      Palpations: Abdomen is soft.      Tenderness: There is no abdominal tenderness. There is no right CVA tenderness, left CVA tenderness, guarding or rebound.      Comments: Abdomen is soft, nontender and nondistended.  No rebound tenderness, guarding or rigidity.  No distention of the bladder.  No CVA tenderness.   Musculoskeletal:      Cervical back: Full passive range of motion without pain, normal range of motion and neck supple. No signs of trauma. No pain with movement. Normal range of motion.      Right lower leg: No edema.      Left lower leg: No edema.   Lymphadenopathy:      Cervical: No cervical adenopathy.   Skin:     General: Skin is warm and dry.      Capillary Refill: Capillary refill takes less than 2  seconds.      Coloration: Skin is not cyanotic, jaundiced or pale.   Neurological:      Mental Status: He is alert and oriented to person, place, and time.      GCS: GCS eye subscore is 4. GCS verbal subscore is 5. GCS motor subscore is 6.      Cranial Nerves: No dysarthria or facial asymmetry.      Gait: Gait normal.   Psychiatric:         Attention and Perception: Attention normal.         Mood and Affect: Mood normal.         Speech: Speech normal.         Behavior: Behavior normal. Behavior is cooperative.         Vital Signs  ED Triage Vitals [02/05/24 1137]   Temperature Pulse Respirations Blood Pressure SpO2   (!) 97.2 °F (36.2 °C) 67 15 118/73 97 %      Temp Source Heart Rate Source Patient Position - Orthostatic VS BP Location FiO2 (%)   Tympanic Monitor Sitting Right arm --      Pain Score       No Pain           Vitals:    02/05/24 1137   BP: 118/73   Pulse: 67   Patient Position - Orthostatic VS: Sitting         Visual Acuity      ED Medications  Medications - No data to display    Diagnostic Studies  Results Reviewed       Procedure Component Value Units Date/Time    Basic metabolic panel [[id number]] Collected: 02/05/24 1256    Lab Status: Final result Specimen: Blood from Arm, Left Updated: 02/05/24 1400     Sodium 135 mmol/L      Potassium 4.2 mmol/L      Chloride 99 mmol/L      CO2 30 mmol/L      ANION GAP 6 mmol/L      BUN 14 mg/dL      Creatinine 0.69 mg/dL      Glucose 91 mg/dL      Calcium 9.1 mg/dL      eGFR 94 ml/min/1.73sq m     Narrative:      National Kidney Disease Foundation guidelines for Chronic Kidney Disease (CKD):     Stage 1 with normal or high GFR (GFR > 90 mL/min/1.73 square meters)    Stage 2 Mild CKD (GFR = 60-89 mL/min/1.73 square meters)    Stage 3A Moderate CKD (GFR = 45-59 mL/min/1.73 square meters)    Stage 3B Moderate CKD (GFR = 30-44 mL/min/1.73 square meters)    Stage 4 Severe CKD (GFR = 15-29 mL/min/1.73 square meters)    Stage 5 End Stage CKD (GFR <15 mL/min/1.73 square  meters)  Note: GFR calculation is accurate only with a steady state creatinine    Urine Microscopic [[id number]]  (Abnormal) Collected: 02/05/24 1256    Lab Status: Final result Specimen: Urine, Indwelling Dobbs Catheter Updated: 02/05/24 1334     RBC, UA Innumerable /hpf      WBC, UA 4-10 /hpf      Epithelial Cells None Seen /hpf      Bacteria, UA None Seen /hpf      MUCUS THREADS Occasional     Hyaline Casts, UA 0-3 /lpf     UA w Reflex to Microscopic w Reflex to Culture [[id number]]  (Abnormal) Collected: 02/05/24 1256    Lab Status: Final result Specimen: Urine, Indwelling Dobbs Catheter Updated: 02/05/24 1324     Color, UA Yellow     Clarity, UA Turbid     Specific Gravity, UA 1.018     pH, UA 7.5     Leukocytes, UA Small     Nitrite, UA Negative     Protein,  (2+) mg/dl      Glucose, UA Negative mg/dl      Ketones, UA Negative mg/dl      Urobilinogen, UA <2.0 mg/dl      Bilirubin, UA Negative     Occult Blood, UA Large    CBC and differential [[id number]] Collected: 02/05/24 1256    Lab Status: Final result Specimen: Blood from Arm, Left Updated: 02/05/24 1312     WBC 6.40 Thousand/uL      RBC 4.29 Million/uL      Hemoglobin 13.4 g/dL      Hematocrit 41.2 %      MCV 96 fL      MCH 31.2 pg      MCHC 32.5 g/dL      RDW 13.0 %      MPV 9.4 fL      Platelets 186 Thousands/uL      nRBC 0 /100 WBCs      Neutrophils Relative 71 %      Immat GRANS % 0 %      Lymphocytes Relative 17 %      Monocytes Relative 10 %      Eosinophils Relative 1 %      Basophils Relative 1 %      Neutrophils Absolute 4.57 Thousands/µL      Immature Grans Absolute 0.01 Thousand/uL      Lymphocytes Absolute 1.07 Thousands/µL      Monocytes Absolute 0.65 Thousand/µL      Eosinophils Absolute 0.06 Thousand/µL      Basophils Absolute 0.04 Thousands/µL                    XR abdomen 1 view kub   Final Result by Jose Gomez MD (02/05 1449)      1. Large calculus in the right renal pelvis measuring 2 cm. Other calculus likely too small or  secured by overlying colonic stool.      2. Large colonic stool burden without bowel obstruction.      3. Compression deformities at T12 and L5 are better demonstrated by prior CT.      4. Cardiomegaly.               Resident: HAZEL Gonzalez I, the attending radiologist, have reviewed the images and agree with the final report above.      Workstation performed: [id number]                    Procedures  Procedures         ED Course               Identification of Seniors at Risk      Flowsheet Row Most Recent Value   (ISAR) Identification of Seniors at Risk    Before the illness or injury that brought you to the Emergency, did you need someone to help you on a regular basis? 0 Filed at: 02/05/2024 1138   In the last 24 hours, have you needed more help than usual? 0 Filed at: 02/05/2024 1138   Have you been hospitalized for one or more nights during the past 6 months? 0 Filed at: 02/05/2024 1138   In general, do you see well? 0 Filed at: 02/05/2024 1138   In general, do you have serious problems with your memory? 0 Filed at: 02/05/2024 1138   Do you take more than three different medications every day? 1 Filed at: 02/05/2024 1138   ISAR Score 1 Filed at: 02/05/2024 1138                        SBIRT 22yo+      Flowsheet Row Most Recent Value   Initial Alcohol Screen: US AUDIT-C     1. How often do you have a drink containing alcohol? 0 Filed at: 02/05/2024 1138   2. How many drinks containing alcohol do you have on a typical day you are drinking?  0 Filed at: 02/05/2024 1138   3a. Male UNDER 65: How often do you have five or more drinks on one occasion? 0 Filed at: 02/05/2024 1138   3b. FEMALE Any Age, or MALE 65+: How often do you have 4 or more drinks on one occassion? 0 Filed at: 02/05/2024 1138   Audit-C Score 0 Filed at: 02/05/2024 1138   SHERITA: How many times in the past year have you...    Used an illegal drug or used a prescription medication for non-medical reasons? Never Filed at: 02/05/2024 1138                       Medical Decision Making  Patient is a 74 y/o male with a PMHx of kidney stones and urinary retention (s/p menendez placement 2/2/24), presenting to the ED for evaluation of a menendez catheter malfunction.     Patient's bladder scan showing 34 mL of urine.  Patient's Menendez was easily flushed with 50mL of sterile water with return of clear urine.  Urinalysis shows innumerable RBCs but no evidence of infection.  Patient is afebrile with normal WBC count.  Kidney function is stable.  X-ray again shows a large calculus in the right renal pelvis as well as a large colonic stool burden without obstruction.  I suspect there may have been sediment or clot blocking the tubing and preventing drainage of urine. Menendez is now draining normally. Patient's wife states that she has contacted the urology office a few times with no response.  She is very concerned about patient developing infection and wants the Menendez removed as soon as possible.  Discussed with urology who evaluated patient in the ED to discuss management and next steps, and will also help facilitate close outpatient follow-up.  I advised patient to return if he developed any fevers, chills, increased blood/clots in urine, decreased output, abdominal pain, flank pain or any other new/concerning symptoms.  Advised close follow-up with urology for further evaluation of urinary retention and void trial.    The management plan was discussed in detail with the patient at bedside and all questions were answered. Strict ED return instructions were discussed at bedside. Prior to discharge, both verbal and written instructions were provided. We discussed the signs and symptoms that should prompt the patient to return to the ED. All questions were answered and the patient was comfortable with the plan of care and discharged home. The patient agrees to return to the Emergency Department for concerns and/or progression of illness.     Amount and/or Complexity of Data  Reviewed  Labs: ordered.  Radiology: ordered.  Discussion of management or test interpretation with external provider(s): Lisandra Babcock (Urology NP)             Disposition  Final diagnoses:   Dobbs catheter problem, initial encounter ([facility])   Microscopic hematuria   Calculus of renal pelvis     Time reflects when diagnosis was documented in both MDM as applicable and the Disposition within this note       Time User Action Codes Description Comment    2/5/2024  2:34 PM Marcelina Naik Add [T83.9XXA] Dobbs catheter problem, initial encounter ([facility])     2/5/2024  4:03 PM Marcelina Naik Add [R31.29] Microscopic hematuria     2/5/2024  4:04 PM Marcelina Naik Add [N20.0] Calculus of renal pelvis           ED Disposition       ED Disposition   Discharge    Condition   Stable    Date/Time   Mon Feb 5, 2024 1603    Comment   Jose Raza discharge to home/self care.                   Follow-up Information       Follow up With Specialties Details Why Contact Info Additional Information    [facility] Schedule an appointment as soon as possible for a visit   [address]  [address]  [facility] [address]  [phone] [facility] [place], [address] [place] [address], [address]   [phone]    [facility] Emergency Department Emergency Medicine  If symptoms worsen [address] [facility] [address]  [phone] [facility] Emergency Department, [address] [place], [place], [address]   [phone]            Discharge Medication List as of 2/5/2024  4:05 PM        CONTINUE these medications which have NOT CHANGED    Details   acetaminophen (TYLENOL) 650 mg CR tablet Take 1 tablet (650 mg total) by mouth every 8 (eight) hours as needed for mild pain, Starting Fri 2/2/2024, Normal      cycloSPORINE (Restasis) 0.05 % ophthalmic emulsion Administer 1 drop to both eyes every 12  (twelve) hours, Starting Fri 4/16/2021, Historical Med      ibuprofen (MOTRIN) 400 mg tablet Take 1 tablet (400 mg total) by mouth every 6 (six) hours as needed for mild pain for up to 7 days, Starting Fri 2/2/2024, Until Fri 2/9/2024 at 2359, Normal      MAGNESIUM CARBONATE PO Take 1 tablet by mouth in the morning, Historical Med      !! NON FORMULARY Triple magnesium (glycinate 10%, oxide 60%, malate 30%,), Historical Med      !! NON FORMULARY Pycnogenol supplement, Historical Med      Omega-3 Fatty Acids (fish oil) 1,000 mg Take 2,000 mg by mouth daily, Historical Med      ondansetron (ZOFRAN) 4 mg tablet Take 1 tablet (4 mg total) by mouth every 6 (six) hours for 14 days, Starting Fri 2/2/2024, Until Fri 2/16/2024, Normal      polyethylene glycol (GLYCOLAX) 17 GM/SCOOP powder Take 17 g by mouth daily, Starting Fri 2/2/2024, Until Sun 3/3/2024, Normal      Pyridoxine HCl (Vitamin B6) 100 MG TABS Historical Med      tamsulosin (FLOMAX) 0.4 mg Take 1 capsule (0.4 mg total) by mouth daily with dinner, Starting Fri 2/2/2024, Until Sun 3/3/2024, Normal       !! - Potential duplicate medications found. Please discuss with provider.          No discharge procedures on file.    PDMP Review       None            ED Provider  Electronically Signed by             Marcelina Naik PA-C  02/06/24 6046

## 2024-02-05 NOTE — TELEPHONE ENCOUNTER
PT was seen in ER. Void trial this week. Schedule follow up with AP in a couple of weeks to discuss kidney stone management and BPH

## 2024-02-05 NOTE — ASSESSMENT & PLAN NOTE
CT demonstrates moderate compression fracture of T12 and L5  Injured back shoveling snow about a month ago  No loss of bowel or bladder function after this event  Denies numbness and tingling in back and legs

## 2024-02-05 NOTE — CONSULTS
Consult - Urology   Jose Raza 1950, 73 y.o. male MRN: [id number]    Unit/Bed#: ED 07 Encounter: [id number]      Other constipation  Assessment & Plan  Pt with chronic constipation   Per wife utilizes holistic approaches to manage  Has 1 bowel movement per day  Continue with current bowel regime in place  Maintain adequate hydration and fluid intake     Nephrolithiasis  Assessment & Plan  CT demonstrates 2.2x1.3 cm kidney stone in the right renal pelvis with mild hydronephrosis   Additional nonobstructive calculi in the right kidney measuring up to 5 mm  Will need urology follow-up for stone management in the future    Urinary retention  Assessment & Plan  Menendez catheter in place  Seen in ER on 2/2/2024 for not being able to urinate  PVR on 2/2/2024 was 400 before menendez placed  Creat 0.69 GFR 94 BUN 14  UA positive for blood and small leukocytes  Last PSA in 2022 was 0.4  Continue Flomax 0.4 mg  Will need PSA in the future  Consider outlet obstruction testing with cystoscopy and transrectal ultrasound  Follow up in urology office within the week for void trial    Acute left-sided low back pain without sciatica  Assessment & Plan  CT demonstrates moderate compression fracture of T12 and L5  Injured back shoveling snow about a month ago  No loss of bowel or bladder function after this event  Denies numbness and tingling in back and legs         Urology will sign off but remain available for any further inpatient needs. Please feel free to contact the provider currently covering the Urology [facility] role for this campus with questions or concerns.      Subjective:   Patient presents to ER today due to menendez catheter not draining adequately. His wife states that she saw a small blood clot pass through, but no urine. He was previously seen in the ER on 2/2/2024 for urinary retention with  mL. A menendez catheter was placed on this date. He has not had any problems with urination before this incident. He  denies hematuria, frequency, urgency, dysuria, CVA tenderness, and abdominal pain.     He reports that there is a family history of enlarged prostate but denies any prostate cancer history. He has never seen a urologist for any issues in the past.    He did hurt his back about a month ago shoveling snow. He denies any numbness or tingling in his back or lower extremities. He denies any changes in bowel or bladder habits since this incident.     Review of Systems   Constitutional:  Negative for activity change, chills and fever.   Respiratory: Negative.     Cardiovascular:  Negative for chest pain and leg swelling.   Gastrointestinal:  Positive for constipation. Negative for abdominal distention, abdominal pain, diarrhea and nausea.   Genitourinary:  Positive for difficulty urinating. Negative for dysuria, flank pain, frequency, hematuria, penile swelling, scrotal swelling, testicular pain and urgency.   Musculoskeletal:  Positive for back pain.   Skin:  Negative for rash.   Neurological: Negative.        Objective:  Vitals: Blood pressure 118/73, pulse 67, temperature (!) 97.2 °F (36.2 °C), temperature source Tympanic, resp. rate 15, SpO2 97%.,There is no height or weight on file to calculate BMI.    Physical Exam  Constitutional:       General: He is not in acute distress.     Appearance: Normal appearance.   HENT:      Head: Normocephalic and atraumatic.   Eyes:      Pupils: Pupils are equal, round, and reactive to light.   Cardiovascular:      Rate and Rhythm: Normal rate.   Pulmonary:      Effort: Pulmonary effort is normal.   Abdominal:      General: Abdomen is flat. There is no distension.      Palpations: Abdomen is soft.      Tenderness: There is no abdominal tenderness. There is no right CVA tenderness, left CVA tenderness or guarding.   Musculoskeletal:         General: Signs of injury (back injury about a month ago, shoveling snow) present.   Skin:     General: Skin is warm and dry.      Findings: No  rash.   Neurological:      Mental Status: He is alert and oriented to person, place, and time.      Sensory: No sensory deficit.      Motor: No weakness.      Gait: Gait normal.   Psychiatric:         Mood and Affect: Mood normal.         Behavior: Behavior normal.         Thought Content: Thought content normal.         Imaging:  CT ABDOMEN AND PELVIS WITHOUT IV CONTRAST - LOW DOSE RENAL STONE     INDICATION: R35.0: Frequency of micturition  R10.9: Unspecified abdominal pain  R31.29: Other microscopic hematuria  R39.9: Unspecified symptoms and signs involving the genitourinary system.     COMPARISON: None.     TECHNIQUE: Low radiation dose thin section CT examination of the abdomen and pelvis was performed without intravenous or oral contrast according to a protocol specifically designed to evaluate for urinary tract calculus. Axial, sagittal, and coronal 2D   reformatted images were created from the source data and submitted for interpretation. Evaluation for pathology in the abdomen and pelvis that is unrelated to urinary tract calculi is limited.     Radiation dose length product (DLP) for this visit: 246 mGy-cm . This examination, like all CT scans performed in the [facility], was performed utilizing techniques to minimize radiation dose exposure, including the use of iterative   reconstruction and automated exposure control.     URINARY TRACT FINDINGS:     RIGHT KIDNEY AND URETER: Large calculus in the right renal pelvis near the ureteropelvic junction measuring 2.2 x 1.3 cm results in mild hydronephrosis. Multiple additional nonobstructing calculi in the right kidney measuring up to 5 mm. Right proximal   ureterectasis also noted without distal obstructing stone. Simple right renal cyst.     LEFT KIDNEY AND URETER: No urinary tract calculi. No hydronephrosis or hydroureter.     URINARY BLADDER: Unremarkable.        ADDITIONAL FINDINGS:     LOWER CHEST: No clinically significant abnormality in  the visualized lower chest.     SOLID VISCERA: Limited low radiation dose noncontrast CT evaluation demonstrates no clinically significant abnormality of the imaged portions of the liver, spleen, pancreas, or adrenal glands.     GALLBLADDER/BILIARY TREE: No calcified gallstones. No pericholecystic inflammatory change. No biliary dilation.     STOMACH AND BOWEL: Moderate colonic stool burden. No bowel obstruction.     APPENDIX: Normal.     ABDOMINOPELVIC CAVITY: No ascites. No pneumoperitoneum. No lymphadenopathy.     VESSELS: Atherosclerosis without abdominal aortic aneurysm.     REPRODUCTIVE ORGANS: Unremarkable for patient's age.     ABDOMINAL WALL/INGUINAL REGIONS: Unremarkable.     BONES: Moderate compression fracture of T12 with superior endplate irregularity and mild bony retropulsion. Moderate compression fracture of L5. Spinal degenerative changes.     IMPRESSION:     1. Large calculus in the right renal pelvis near the ureteropelvic junction measuring 2.2 x 1.3 cm results in mild hydronephrosis. Right proximal ureterectasis also noted without distal obstructing stone.     2. Multiple additional nonobstructing calculi in the right kidney measuring up to 5 mm.     3. No left obstructive uropathy or urinary tract calculi.     Imaging reviewed - both report and images personally reviewed.     Labs:  Recent Labs     02/02/24  1827 02/05/24  1256   WBC 5.30 6.40     Recent Labs     02/02/24  1827 02/05/24  1256   HGB 14.3 13.4       Recent Labs     02/02/24  1827 02/05/24  1256   CREATININE 0.83 0.69           History:  Social History     Socioeconomic History    Marital status: /Civil Union     Spouse name: Not on file    Number of children: Not on file    Years of education: Not on file    Highest education level: Not on file   Occupational History    Not on file   Tobacco Use    Smoking status: Former     Current packs/day: 0.00     Types: Cigarettes     Start date: 1972     Quit date: 1973     Years  since quittin.1    Smokeless tobacco: Never    Tobacco comments:     Social    Vaping Use    Vaping status: Never Used   Substance and Sexual Activity    Alcohol use: Not Currently    Drug use: Never    Sexual activity: Not on file   Other Topics Concern    Not on file   Social History Narrative    Not on file     Social Determinants of Health     Financial Resource Strain: Low Risk  (2023)    Received from [facility]    Overall Financial Resource Strain (CARDIA)     Difficulty of Paying Living Expenses: Not hard at all   Food Insecurity: No Food Insecurity (2023)    Received from [facility]    Hunger Vital Sign     Worried About Running Out of Food in the Last Year: Never true     Ran Out of Food in the Last Year: Never true   Transportation Needs: No Transportation Needs (2023)    Received from [facility]    PRAPARE - Transportation     Lack of Transportation (Medical): No     Lack of Transportation (Non-Medical): No   Physical Activity: Not on file   Stress: No Stress Concern Present (2023)    Received from [facility]    [place] [employer] - Occupational Stress Questionnaire     Feeling of Stress : Only a little   Social Connections: Socially Integrated (2023)    Received from [facility]    Social Connection and Isolation Panel [NHANES]     Frequency of Communication with Friends and Family: More than three times a week     Frequency of Social Gatherings with Friends and Family: More than three times a week     Attends Anabaptist Services: More than 4 times per year     Active Member of Clubs or Organizations: Yes     Attends Club or Organization Meetings: Never     Marital Status:    Intimate Partner Violence: Not At Risk (2023)    Received from [facility]    Humiliation, Afraid, Rape, and Kick questionnaire     Fear of Current or Ex-Partner:  No     Emotionally Abused: No     Physically Abused: No     Sexually Abused: No   Housing Stability: Low Risk  (6/13/2023)    Received from     Housing Stability Vital Sign     Unable to Pay for Housing in the Last Year: No     Number of Places Lived in the Last Year: 1     Unstable Housing in the Last Year: No     No past medical history on file.  Past Surgical History:   Procedure Laterality Date    HERNIA REPAIR       Family History   Problem Relation Age of Onset    Brain cancer Mother     Aneurysm Father        NICOL Whitehead  Date: 2/5/2024 Time: 4:26 PM

## 2024-02-05 NOTE — TELEPHONE ENCOUNTER
Pt wife called and stated pt was in ED for kidney stones and urinary retention. Pt was discharged with menendez cath in place and looking for pt to be scheduled for appropriate timing.    Pt call back-[phone]

## 2024-02-05 NOTE — ASSESSMENT & PLAN NOTE
Pt with chronic constipation   Per wife utilizes holistic approaches to manage  Has 1 bowel movement per day  Continue with current bowel regime in place  Maintain adequate hydration and fluid intake

## 2024-02-05 NOTE — ASSESSMENT & PLAN NOTE
Menendez catheter in place  Seen in ER on 2/2/2024 for not being able to urinate  PVR on 2/2/2024 was 400 before menendez placed  Creat 0.69 GFR 94 BUN 14  UA positive for blood and small leukocytes  Last PSA in 2022 was 0.4  Continue Flomax 0.4 mg  Will need PSA in the future  Consider outlet obstruction testing with cystoscopy and transrectal ultrasound  Follow up in urology office within the week for void trial

## 2024-02-05 NOTE — ED NOTES
Dobbs flushed with 50ml sterile water   Rea Blandon RN  02/05/24 1259       Rea Blandon RN  02/05/24 1253

## 2024-02-05 NOTE — TELEPHONE ENCOUNTER
Pt with urinary retention and right renal pelvis stone. Please schedule new patient appointment/ void trial in the next week or two

## 2024-02-05 NOTE — ASSESSMENT & PLAN NOTE
CT demonstrates 2.2x1.3 cm kidney stone in the right renal pelvis with mild hydronephrosis   Additional nonobstructive calculi in the right kidney measuring up to 5 mm  Will need urology follow-up for stone management in the future

## 2024-02-05 NOTE — TELEPHONE ENCOUNTER
"Patient's spouse Alicia, called back to check on the status of the appointment.    Pt's spouse would like him to be seen sooner than the 1-4 week estimated time. Says \"he cannot be with it for that long.\"    Call back Alicia [phone]  "

## 2024-02-06 NOTE — TELEPHONE ENCOUNTER
Called and spoke with Alicia and schedule him for a void trial on Monday at the [facility] office. Reviewed care of catheter .  Told her I would look for an appointment for f/u to disucss stone surgery in the future

## 2024-02-12 ENCOUNTER — PROCEDURE VISIT (OUTPATIENT)
Dept: UROLOGY | Facility: AMBULATORY SURGERY CENTER | Age: 74
End: 2024-02-12
Payer: MEDICARE

## 2024-02-12 VITALS
DIASTOLIC BLOOD PRESSURE: 63 MMHG | BODY MASS INDEX: 22.25 KG/M2 | HEART RATE: 63 BPM | WEIGHT: 155.4 LBS | HEIGHT: 70 IN | SYSTOLIC BLOOD PRESSURE: 98 MMHG

## 2024-02-12 DIAGNOSIS — R33.9 URINARY RETENTION: Primary | ICD-10-CM

## 2024-02-12 LAB — POST-VOID RESIDUAL VOLUME, ML POC: 0 ML

## 2024-02-12 PROCEDURE — 99211 OFF/OP EST MAY X REQ PHY/QHP: CPT

## 2024-02-12 PROCEDURE — 51798 US URINE CAPACITY MEASURE: CPT

## 2024-02-12 NOTE — PROGRESS NOTES
"2/12/2024    Jose Raza  1950  [id number]    Diagnosis  Chief Complaint    Urinary Retention; Nephrolithiasis         Patient presents for void trial managed by our office    Plan  Patient will follow up in 2 weeks to discuss surgery    Procedure Dobbs removal/voiding trial    Dobbs catheter removed after deflation of an intact balloon. Patient tolerated well. Encouraged patient to hydrate well and return this afternoon for post void residual.  he knows he may return early if uncomfortable and unable to urinate. Patient agrees to this plan.    Patient returned this afternoon. Patient states able to void. Patient voided 0 ml while in office. Bladder ultrasound performed and PVR measured 0 ml.    Recent Results (from the past 4 hour(s))   POCT Measure PVR    Collection Time: 02/12/24  3:23 PM   Result Value Ref Range    POST-VOID RESIDUAL VOLUME, ML POC 0 mL           Vitals:    02/12/24 0914   BP: 98/63   Pulse: 63   Weight: 70.5 kg (155 lb 6.4 oz)   Height: 5' 10\" (1.778 m)           Rahel Bush RN       "

## 2024-02-14 ENCOUNTER — APPOINTMENT (OUTPATIENT)
Dept: PHYSICAL THERAPY | Age: 74
End: 2024-02-14
Payer: MEDICARE

## 2024-02-15 ENCOUNTER — NURSE TRIAGE (OUTPATIENT)
Age: 74
End: 2024-02-15

## 2024-02-15 ENCOUNTER — OFFICE VISIT (OUTPATIENT)
Dept: URGENT CARE | Age: 74
End: 2024-02-15
Payer: MEDICARE

## 2024-02-15 ENCOUNTER — OFFICE VISIT (OUTPATIENT)
Dept: PHYSICAL THERAPY | Age: 74
End: 2024-02-15
Payer: MEDICARE

## 2024-02-15 VITALS
OXYGEN SATURATION: 97 % | SYSTOLIC BLOOD PRESSURE: 160 MMHG | DIASTOLIC BLOOD PRESSURE: 105 MMHG | RESPIRATION RATE: 18 BRPM | TEMPERATURE: 98.2 F

## 2024-02-15 DIAGNOSIS — R07.0 BURNING SENSATION OF THROAT: Primary | ICD-10-CM

## 2024-02-15 DIAGNOSIS — M54.50 ACUTE LEFT-SIDED LOW BACK PAIN WITHOUT SCIATICA: Primary | ICD-10-CM

## 2024-02-15 PROCEDURE — 99213 OFFICE O/P EST LOW 20 MIN: CPT | Performed by: NURSE PRACTITIONER

## 2024-02-15 PROCEDURE — G0463 HOSPITAL OUTPT CLINIC VISIT: HCPCS | Performed by: NURSE PRACTITIONER

## 2024-02-15 PROCEDURE — 97110 THERAPEUTIC EXERCISES: CPT

## 2024-02-15 NOTE — PROGRESS NOTES
"Daily Note     Today's date: 2/15/2024  Patient name: Jose Raza  : 1950  MRN: [id number]  Referring provider: Oral Quintero MD  Dx: No diagnosis found.               Subjective: Patient presents to therapy reporting that he has had a rough two weeks. It was found he has a fairly large kidney stone, however, this is on his R side. Patient will require surgery to remove this, which has not been scheduled yet. Patient and patient's wife report that he was doing well with the initial exercises, noting reduction in his pain and improved function, but that today when walking out of closet, he tweaked his back, and has been having high levels of pain since then - wife had to help him up. Has been using a heating pad throughout the day, which has helped. CT scan also showed a compression fracture at T12 and L5. Patient also reports he has been experiencing burning sensation in his throat over the last few days, with no other flu-like symptoms - patient's wife believes this may be secondary to acid reflux.       Objective: See treatment diary below      Assessment: Tolerated treatment fair. Patient would benefit from continued PT. TE performed within patient's pain threshold to improve lumbar mobility, core strength, and LE mm flexibility. Patient tolerated TE well without increase in his symptoms. Recommended patient and patient wife to reach out to PCP regarding his symptom of \"burning in throat.\" She will reach out to ENT tomorrow - recommended patient present to Urgent Care if unable to get in with ENT tomorrow, which they verbalized agreement. Lots of education with patient today, and TE performed within patient's tolerance. Will progress as able to tolerate to improve abilities with all functional tasks.       Plan: Progress treatment as tolerated.       Precautions: N/A      Manuals 01/30 02/15                                                               Neuro Re-Ed                                     "                                                                    Ther Ex             Nu-step  10'           LTR 10x (omitted due to pain) x           Cat-cow 2 x 10            SKTC 3 x 30sec 3 x 30sec           Piriformis stretch  3 x 30sec 3 x 30sec           Eduction on load management/activity modification 5'            Physioball core isometric  3 x 10, 5 sec holds           Physioball DKTC  3 x 10           Education  15'           Ther Activity                                       Gait Training                                       Modalities

## 2024-02-15 NOTE — TELEPHONE ENCOUNTER
Additional Information   Negative: The patient has a fever of 101 or higher   Negative: The patient has severe uncontrolled pain   Negative: The patient has persistent vomiting    Answer Questions - Initial Assessment   When did this pain start: This morning    Where is your pain: lower back    Is your pain on the left, right, or both sides: both    Does your pain radiate anywhere: pain does not radiate    Can you qualify the pain: intermittent and sharp, when he tries to stand up, has pulled muscle already, concerned weather this is pain from the muscle or kidney stones.     Do you have nausea or vomiting: no    Do you have a fever 101 or higher: No    Do you have any urinary symptoms: no    Have you had recent imaging: yes    Reviewed bladder irritants to avoid, and to stay hydrated with at least 64 oz. Of water/day as long as no fluid restrictions, avoidance of constipation. ED precautions reviewed as well.    Protocols used: FLANK PAIN / KIDNEY STONES / HYDRONEPHROSIS  Due to history of compression fractures, advised client ot also reach out to PCP as they help manage this.

## 2024-02-16 ENCOUNTER — OFFICE VISIT (OUTPATIENT)
Dept: INTERNAL MEDICINE CLINIC | Age: 74
End: 2024-02-16
Payer: MEDICARE

## 2024-02-16 VITALS
BODY MASS INDEX: 22.33 KG/M2 | SYSTOLIC BLOOD PRESSURE: 128 MMHG | TEMPERATURE: 97.9 F | HEART RATE: 64 BPM | WEIGHT: 156 LBS | OXYGEN SATURATION: 96 % | DIASTOLIC BLOOD PRESSURE: 64 MMHG | HEIGHT: 70 IN

## 2024-02-16 DIAGNOSIS — M54.50 ACUTE BILATERAL LOW BACK PAIN WITHOUT SCIATICA: ICD-10-CM

## 2024-02-16 DIAGNOSIS — S32.050A COMPRESSION FRACTURE OF L5 VERTEBRA, INITIAL ENCOUNTER (HCC): ICD-10-CM

## 2024-02-16 DIAGNOSIS — S22.080A COMPRESSION FRACTURE OF T12 VERTEBRA, INITIAL ENCOUNTER (HCC): ICD-10-CM

## 2024-02-16 DIAGNOSIS — K21.9 GASTROESOPHAGEAL REFLUX DISEASE WITHOUT ESOPHAGITIS: Primary | ICD-10-CM

## 2024-02-16 DIAGNOSIS — N20.0 NEPHROLITHIASIS: ICD-10-CM

## 2024-02-16 DIAGNOSIS — Z87.19 HISTORY OF BARRETT'S ESOPHAGUS: ICD-10-CM

## 2024-02-16 PROCEDURE — 99214 OFFICE O/P EST MOD 30 MIN: CPT | Performed by: INTERNAL MEDICINE

## 2024-02-16 RX ORDER — PANTOPRAZOLE SODIUM 40 MG/1
40 TABLET, DELAYED RELEASE ORAL
Qty: 30 TABLET | Refills: 5 | Status: SHIPPED | OUTPATIENT
Start: 2024-02-16 | End: 2024-02-23 | Stop reason: ALTCHOICE

## 2024-02-16 RX ORDER — FAMOTIDINE 20 MG/1
20 TABLET, FILM COATED ORAL 2 TIMES DAILY
Qty: 60 TABLET | Refills: 1 | Status: SHIPPED | OUTPATIENT
Start: 2024-02-16 | End: 2024-02-23 | Stop reason: ALTCHOICE

## 2024-02-16 RX ORDER — TRAMADOL HYDROCHLORIDE 50 MG/1
50 TABLET ORAL EVERY 12 HOURS PRN
Qty: 30 TABLET | Refills: 0 | Status: SHIPPED | OUTPATIENT
Start: 2024-02-16 | End: 2024-02-23 | Stop reason: ALTCHOICE

## 2024-02-16 NOTE — PROGRESS NOTES
Assessment/Plan:    GERD with a history of Tsai's esophagus  -Symptoms were likely worsened by NSAIDs that patient received while in the hospital  -We will start him on pantoprazole 40 mg daily as well as famotidine 20 mg twice daily  -Patient was counseled to avoid diets and behaviors that trigger symptoms  -Will refer patient to gastroenterology for an EGD  -Follow-up with PCP    Low back pain with compression fracture of L5 and T12  -Will start patient on tramadol and he may alternate it with Tylenol  -The [facility] website was queried and did not show misuse  -Will refer patient to spine and pain  -We will also order a DEXA scan because patient had the fractures without any significant trauma    Nephrolithiasis with hydronephrosis  -Status post stent placement  -Continue with tamsulosin  -Continue to follow with urology  -Keep very well-hydrated with at least 2 L of water daily     Diagnoses and all orders for this visit:    Gastroesophageal reflux disease without esophagitis  -     famotidine (PEPCID) 20 mg tablet; Take 1 tablet (20 mg total) by mouth 2 (two) times a day  -     pantoprazole (PROTONIX) 40 mg tablet; Take 1 tablet (40 mg total) by mouth daily before breakfast    History of Tsai's esophagus  -     famotidine (PEPCID) 20 mg tablet; Take 1 tablet (20 mg total) by mouth 2 (two) times a day  -     Ambulatory Referral to Gastroenterology; Future  -     pantoprazole (PROTONIX) 40 mg tablet; Take 1 tablet (40 mg total) by mouth daily before breakfast    Compression fracture of L5 vertebra, initial encounter (HCC)  -     Ambulatory referral to Spine & Pain Management; Future  -     famotidine (PEPCID) 20 mg tablet; Take 1 tablet (20 mg total) by mouth 2 (two) times a day  -     Ambulatory Referral to Gastroenterology; Future  -     DXA bone density spine hip and pelvis; Future  -     traMADol (Ultram) 50 mg tablet; Take 1 tablet (50 mg total) by mouth every 12 (twelve) hours as needed for  moderate pain    Compression fracture of T12 vertebra, initial encounter (HCC)  -     Ambulatory referral to Spine & Pain Management; Future  -     DXA bone density spine hip and pelvis; Future  -     traMADol (Ultram) 50 mg tablet; Take 1 tablet (50 mg total) by mouth every 12 (twelve) hours as needed for moderate pain    Acute bilateral low back pain without sciatica  -     traMADol (Ultram) 50 mg tablet; Take 1 tablet (50 mg total) by mouth every 12 (twelve) hours as needed for moderate pain    Nephrolithiasis    Other orders  -     patient supplied medication; Take 1 each by mouth daily One probiotic daily          Subjective:      Patient ID: Jose Raza is a 73 y.o. male.    HPI  Most of the history is from patient's wife because of his history of mild cognitive impairment.  Patient presents with his wife with complaints of wosening pain in his lower back after it popped yesterday.  Of note,he had been seen in our office 2 weeks ago and had imaging done that showed that he had 2 compression fractures and kidney stones with hydronephrosis. He had been referred to the ED and saw urology and had a stent placed.   His wife states that they have not yet seen his PCP.   She states that they have not seen anybody for the compression fractures and all patient takes for his pain is tylenol.   He used the  after it snowed 3 days ago and states that he did okay afterwards but suddenly back pain worsened yesterday after his back popped.   Of note, patient and his wife do not remember any trauma that could have caused him to have fractures.  Had PT yesterday and it helped  Also c/o a burning pain in his throat that has been worsening.  His wife states that the pain is worse at night.  Of note, patient has a history of Tsai's esophagus and about 1 year ago they decided to stop taking the PPI because of the possible side effects.  The last time he was seen by a gastroenterologist was about 5 years ago and he  has not had an EGD since then.  Also of note, he received Motrin while hospitalized.  He admits to postnasal drip, cough, anorexia, voice change.  Has been eating okay but yesterday and today not so much       The following portions of the patient's history were reviewed and updated as appropriate: He  has a past medical history of Memory loss.  He   Patient Active Problem List    Diagnosis Date Noted   • Urinary retention 02/05/2024   • Nephrolithiasis 02/05/2024   • Other constipation 02/05/2024   • Acute left-sided low back pain without sciatica 01/22/2024   • Cognitive impairment 08/23/2023   • Chronic fatigue syndrome 08/23/2023   • Nail abnormality 08/23/2023   • Iron deficiency anemia 08/23/2023     He  has a past surgical history that includes Hernia repair and Knee surgery.  His family history includes Aneurysm in his father; Brain cancer in his mother; Cancer in his mother.  He  reports that he quit smoking about 51 years ago. His smoking use included cigarettes. He started smoking about 52 years ago. He has never used smokeless tobacco. He reports that he does not currently use alcohol. He reports that he does not use drugs.  Current Outpatient Medications   Medication Sig Dispense Refill   • cycloSPORINE (Restasis) 0.05 % ophthalmic emulsion Administer 1 drop to both eyes every 12 (twelve) hours     • famotidine (PEPCID) 20 mg tablet Take 1 tablet (20 mg total) by mouth 2 (two) times a day 60 tablet 1   • MAGNESIUM CARBONATE PO Take 1 tablet by mouth in the morning     • NON FORMULARY Triple magnesium (glycinate 10%, oxide 60%, malate 30%,)     • NON FORMULARY Pycnogenol supplement     • Omega-3 Fatty Acids (fish oil) 1,000 mg Take 2,000 mg by mouth daily     • pantoprazole (PROTONIX) 40 mg tablet Take 1 tablet (40 mg total) by mouth daily before breakfast 30 tablet 5   • patient supplied medication Take 1 each by mouth daily One probiotic daily     • Pyridoxine HCl (Vitamin B6) 100 MG TABS      •  tamsulosin (FLOMAX) 0.4 mg Take 1 capsule (0.4 mg total) by mouth daily with dinner 30 capsule 0   • traMADol (Ultram) 50 mg tablet Take 1 tablet (50 mg total) by mouth every 12 (twelve) hours as needed for moderate pain 30 tablet 0     No current facility-administered medications for this visit.     Current Outpatient Medications on File Prior to Visit   Medication Sig   • cycloSPORINE (Restasis) 0.05 % ophthalmic emulsion Administer 1 drop to both eyes every 12 (twelve) hours   • MAGNESIUM CARBONATE PO Take 1 tablet by mouth in the morning   • NON FORMULARY Triple magnesium (glycinate 10%, oxide 60%, malate 30%,)   • NON FORMULARY Pycnogenol supplement   • Omega-3 Fatty Acids (fish oil) 1,000 mg Take 2,000 mg by mouth daily   • patient supplied medication Take 1 each by mouth daily One probiotic daily   • Pyridoxine HCl (Vitamin B6) 100 MG TABS    • tamsulosin (FLOMAX) 0.4 mg Take 1 capsule (0.4 mg total) by mouth daily with dinner   • [DISCONTINUED] acetaminophen (TYLENOL) 650 mg CR tablet Take 1 tablet (650 mg total) by mouth every 8 (eight) hours as needed for mild pain   • [DISCONTINUED] ibuprofen (MOTRIN) 400 mg tablet Take 1 tablet (400 mg total) by mouth every 6 (six) hours as needed for mild pain for up to 7 days   • [DISCONTINUED] ondansetron (ZOFRAN) 4 mg tablet Take 1 tablet (4 mg total) by mouth every 6 (six) hours for 14 days   • [DISCONTINUED] polyethylene glycol (GLYCOLAX) 17 GM/SCOOP powder Take 17 g by mouth daily     No current facility-administered medications on file prior to visit.     He has No Known Allergies..    Review of Systems   Constitutional:  Positive for appetite change. Negative for activity change, chills, fatigue, fever and unexpected weight change.   HENT:  Positive for postnasal drip, sore throat (Burning sensation in the throat especially in the evening) and voice change. Negative for ear pain, rhinorrhea and sinus pressure.    Eyes:  Negative for pain.   Respiratory:   "Positive for cough. Negative for choking, chest tightness, shortness of breath and wheezing.    Cardiovascular:  Negative for chest pain, palpitations and leg swelling.   Gastrointestinal:  Positive for constipation. Negative for abdominal pain, diarrhea, nausea and vomiting.   Genitourinary:  Negative for dysuria and hematuria.   Musculoskeletal:  Positive for back pain. Negative for arthralgias, gait problem, joint swelling, myalgias and neck stiffness.   Skin:  Negative for pallor and rash.   Neurological:  Negative for dizziness, tremors, seizures, syncope, light-headedness and headaches.   Hematological:  Negative for adenopathy.   Psychiatric/Behavioral:  Negative for behavioral problems.          Objective:      /64 (BP Location: Left arm, Patient Position: Sitting, Cuff Size: Standard)   Pulse 64   Temp 97.9 °F (36.6 °C) (Temporal)   Ht 5' 10\" (1.778 m)   Wt 70.8 kg (156 lb)   SpO2 96%   BMI 22.38 kg/m²          Physical Exam  Constitutional:       General: He is not in acute distress.     Appearance: He is well-developed. He is not diaphoretic.   HENT:      Head: Normocephalic and atraumatic.      Right Ear: External ear normal.      Left Ear: External ear normal.      Nose: Nose normal.      Mouth/Throat:      Mouth: Mucous membranes are dry.      Pharynx: Posterior oropharyngeal erythema (Oropharyngeal erythema with dry mucous membranes and signs of postnasal drip) present. No oropharyngeal exudate.   Eyes:      General: No scleral icterus.        Right eye: No discharge.         Left eye: No discharge.      Conjunctiva/sclera: Conjunctivae normal.      Pupils: Pupils are equal, round, and reactive to light.   Neck:      Thyroid: No thyromegaly.      Vascular: No JVD.      Trachea: No tracheal deviation.   Cardiovascular:      Rate and Rhythm: Normal rate and regular rhythm.      Heart sounds: Normal heart sounds. No murmur heard.     No friction rub. No gallop.   Pulmonary:      Effort: " Pulmonary effort is normal. No respiratory distress.      Breath sounds: Normal breath sounds. No wheezing or rales.   Chest:      Chest wall: No tenderness.   Abdominal:      General: Bowel sounds are normal. There is no distension.      Palpations: Abdomen is soft. There is no mass.      Tenderness: There is no abdominal tenderness. There is no guarding or rebound.   Musculoskeletal:         General: No deformity. Normal range of motion.      Cervical back: Normal range of motion and neck supple.      Lumbar back: Tenderness (Tenderness in the lumbar region, worse on flexion and sidebending to the right) present.   Lymphadenopathy:      Cervical: No cervical adenopathy.   Skin:     General: Skin is warm and dry.      Coloration: Skin is not pale.      Findings: No erythema or rash.   Neurological:      Mental Status: He is alert and oriented to person, place, and time.      Cranial Nerves: No cranial nerve deficit.      Motor: No abnormal muscle tone.      Coordination: Coordination normal.      Deep Tendon Reflexes: Reflexes are normal and symmetric.   Psychiatric:         Behavior: Behavior normal.           Procedure visit on 02/12/2024   Component Date Value Ref Range Status   • POST-VOID RESIDUAL VOLUME, ML POC 02/12/2024 0  mL Final   Admission on 02/05/2024, Discharged on 02/05/2024   Component Date Value Ref Range Status   • WBC 02/05/2024 6.40  4.31 - 10.16 Thousand/uL Final   • RBC 02/05/2024 4.29  3.88 - 5.62 Million/uL Final   • Hemoglobin 02/05/2024 13.4  12.0 - 17.0 g/dL Final   • Hematocrit 02/05/2024 41.2  36.5 - 49.3 % Final   • MCV 02/05/2024 96  82 - 98 fL Final   • MCH 02/05/2024 31.2  26.8 - 34.3 pg Final   • MCHC 02/05/2024 32.5  31.4 - 37.4 g/dL Final   • RDW 02/05/2024 13.0  11.6 - 15.1 % Final   • MPV 02/05/2024 9.4  8.9 - 12.7 fL Final   • Platelets 02/05/2024 186  149 - 390 Thousands/uL Final   • nRBC 02/05/2024 0  /100 WBCs Final   • Neutrophils Relative 02/05/2024 71  43 - 75 % Final    • Immat GRANS % 02/05/2024 0  0 - 2 % Final   • Lymphocytes Relative 02/05/2024 17  14 - 44 % Final   • Monocytes Relative 02/05/2024 10  4 - 12 % Final   • Eosinophils Relative 02/05/2024 1  0 - 6 % Final   • Basophils Relative 02/05/2024 1  0 - 1 % Final   • Neutrophils Absolute 02/05/2024 4.57  1.85 - 7.62 Thousands/µL Final   • Immature Grans Absolute 02/05/2024 0.01  0.00 - 0.20 Thousand/uL Final   • Lymphocytes Absolute 02/05/2024 1.07  0.60 - 4.47 Thousands/µL Final   • Monocytes Absolute 02/05/2024 0.65  0.17 - 1.22 Thousand/µL Final   • Eosinophils Absolute 02/05/2024 0.06  0.00 - 0.61 Thousand/µL Final   • Basophils Absolute 02/05/2024 0.04  0.00 - 0.10 Thousands/µL Final   • Sodium 02/05/2024 135  135 - 147 mmol/L Final   • Potassium 02/05/2024 4.2  3.5 - 5.3 mmol/L Final   • Chloride 02/05/2024 99  96 - 108 mmol/L Final   • CO2 02/05/2024 30  21 - 32 mmol/L Final   • ANION GAP 02/05/2024 6  mmol/L Final   • BUN 02/05/2024 14  5 - 25 mg/dL Final   • Creatinine 02/05/2024 0.69  0.60 - 1.30 mg/dL Final    Standardized to IDMS reference method   • Glucose 02/05/2024 91  65 - 140 mg/dL Final    If the patient is fasting, the ADA then defines impaired fasting glucose as > 100 mg/dL and diabetes as > or equal to 123 mg/dL.   • Calcium 02/05/2024 9.1  8.4 - 10.2 mg/dL Final   • eGFR 02/05/2024 94  ml/min/1.73sq m Final   • Color, UA 02/05/2024 Yellow   Final   • Clarity, UA 02/05/2024 Turbid   Final   • Specific Gravity, UA 02/05/2024 1.018  1.003 - 1.030 Final   • pH, UA 02/05/2024 7.5  4.5, 5.0, 5.5, 6.0, 6.5, 7.0, 7.5, 8.0 Final   • Leukocytes, UA 02/05/2024 Small (A)  Negative Final   • Nitrite, UA 02/05/2024 Negative  Negative Final   • Protein, UA 02/05/2024 100 (2+) (A)  Negative mg/dl Final   • Glucose, UA 02/05/2024 Negative  Negative mg/dl Final   • Ketones, UA 02/05/2024 Negative  Negative mg/dl Final   • Urobilinogen, UA 02/05/2024 <2.0  <2.0 mg/dl mg/dl Final   • Bilirubin, UA 02/05/2024 Negative   Negative Final   • Occult Blood, UA 02/05/2024 Large (A)  Negative Final   • RBC, UA 02/05/2024 Innumerable (A)  None Seen, 1-2 /hpf Final   • WBC, UA 02/05/2024 4-10 (A)  None Seen, 1-2 /hpf Final   • Epithelial Cells 02/05/2024 None Seen  None Seen, Occasional /hpf Final   • Bacteria, UA 02/05/2024 None Seen  None Seen, Occasional /hpf Final   • MUCUS THREADS 02/05/2024 Occasional (A)  None Seen Final   • Hyaline Casts, UA 02/05/2024 0-3 (A)  None Seen /lpf Final   Admission on 02/02/2024, Discharged on 02/03/2024   Component Date Value Ref Range Status   • WBC 02/02/2024 5.30  4.31 - 10.16 Thousand/uL Final   • RBC 02/02/2024 4.50  3.88 - 5.62 Million/uL Final   • Hemoglobin 02/02/2024 14.3  12.0 - 17.0 g/dL Final   • Hematocrit 02/02/2024 42.9  36.5 - 49.3 % Final   • MCV 02/02/2024 95  82 - 98 fL Final   • MCH 02/02/2024 31.8  26.8 - 34.3 pg Final   • MCHC 02/02/2024 33.3  31.4 - 37.4 g/dL Final   • RDW 02/02/2024 12.9  11.6 - 15.1 % Final   • MPV 02/02/2024 9.5  8.9 - 12.7 fL Final   • Platelets 02/02/2024 209  149 - 390 Thousands/uL Final   • nRBC 02/02/2024 0  /100 WBCs Final   • Neutrophils Relative 02/02/2024 62  43 - 75 % Final   • Immat GRANS % 02/02/2024 0  0 - 2 % Final   • Lymphocytes Relative 02/02/2024 24  14 - 44 % Final   • Monocytes Relative 02/02/2024 10  4 - 12 % Final   • Eosinophils Relative 02/02/2024 3  0 - 6 % Final   • Basophils Relative 02/02/2024 1  0 - 1 % Final   • Neutrophils Absolute 02/02/2024 3.29  1.85 - 7.62 Thousands/µL Final   • Immature Grans Absolute 02/02/2024 0.01  0.00 - 0.20 Thousand/uL Final   • Lymphocytes Absolute 02/02/2024 1.28  0.60 - 4.47 Thousands/µL Final   • Monocytes Absolute 02/02/2024 0.55  0.17 - 1.22 Thousand/µL Final   • Eosinophils Absolute 02/02/2024 0.13  0.00 - 0.61 Thousand/µL Final   • Basophils Absolute 02/02/2024 0.04  0.00 - 0.10 Thousands/µL Final   • Sodium 02/02/2024 135  135 - 147 mmol/L Final   • Potassium 02/02/2024 4.2  3.5 - 5.3 mmol/L  Final   • Chloride 02/02/2024 99  96 - 108 mmol/L Final   • CO2 02/02/2024 30  21 - 32 mmol/L Final   • ANION GAP 02/02/2024 6  mmol/L Final   • BUN 02/02/2024 10  5 - 25 mg/dL Final   • Creatinine 02/02/2024 0.83  0.60 - 1.30 mg/dL Final    Standardized to IDMS reference method   • Glucose 02/02/2024 82  65 - 140 mg/dL Final    If the patient is fasting, the ADA then defines impaired fasting glucose as > 100 mg/dL and diabetes as > or equal to 123 mg/dL.   • Calcium 02/02/2024 9.4  8.4 - 10.2 mg/dL Final   • AST 02/02/2024 24  13 - 39 U/L Final   • ALT 02/02/2024 17  7 - 52 U/L Final    Specimen collection should occur prior to Sulfasalazine administration due to the potential for falsely depressed results.    • Alkaline Phosphatase 02/02/2024 146 (H)  34 - 104 U/L Final   • Total Protein 02/02/2024 7.2  6.4 - 8.4 g/dL Final   • Albumin 02/02/2024 4.3  3.5 - 5.0 g/dL Final   • Total Bilirubin 02/02/2024 0.50  0.20 - 1.00 mg/dL Final    Use of this assay is not recommended for patients undergoing treatment with eltrombopag due to the potential for falsely elevated results.  N-acetyl-p-benzoquinone imine (metabolite of Acetaminophen) will generate erroneously low results in samples for patients that have taken an overdose of Acetaminophen.   • eGFR 02/02/2024 87  ml/min/1.73sq m Final   • Lipase 02/02/2024 10 (L)  11 - 82 u/L Final   • Color, UA 02/02/2024 Light Yellow   Final   • Clarity, UA 02/02/2024 Clear   Final   • Specific Gravity, UA 02/02/2024 1.007  1.003 - 1.030 Final   • pH, UA 02/02/2024 7.0  4.5, 5.0, 5.5, 6.0, 6.5, 7.0, 7.5, 8.0 Final   • Leukocytes, UA 02/02/2024 Trace (A)  Negative Final   • Nitrite, UA 02/02/2024 Negative  Negative Final   • Protein, UA 02/02/2024 Negative  Negative mg/dl Final   • Glucose, UA 02/02/2024 Negative  Negative mg/dl Final   • Ketones, UA 02/02/2024 Negative  Negative mg/dl Final   • Urobilinogen, UA 02/02/2024 <2.0  <2.0 mg/dl mg/dl Final   • Bilirubin, UA 02/02/2024  Negative  Negative Final   • Occult Blood, UA 02/02/2024 Trace (A)  Negative Final   • RBC, UA 02/02/2024 2-4 (A)  None Seen, 1-2 /hpf Final   • WBC, UA 02/02/2024 1-2  None Seen, 1-2 /hpf Final   • Epithelial Cells 02/02/2024 None Seen  None Seen, Occasional /hpf Final   • Bacteria, UA 02/02/2024 None Seen  None Seen, Occasional /hpf Final   Office Visit on 02/02/2024   Component Date Value Ref Range Status   • LEUKOCYTE ESTERASE,UA 02/02/2024 negative   Final   • NITRITE,UA 02/02/2024 negative   Final   • SL AMB POCT UROBILINOGEN 02/02/2024 0.2 E.U/dL   Final   • POCT URINE PROTEIN 02/02/2024 negative   Final   •  PH,UA 02/02/2024 8.5   Final   • BLOOD,UA 02/02/2024 Trace-intact   Final   • SPECIFIC GRAVITY,UA 02/02/2024 1.015   Final   • KETONES,UA 02/02/2024 negative   Final   • BILIRUBIN,UA 02/02/2024 negative   Final   • GLUCOSE, UA 02/02/2024 negative   Final   •  COLOR,UA 02/02/2024 yellow   Final   • CLARITY,UA 02/02/2024 clear   Final   • Color, UA 02/02/2024 Light Yellow   Final   • Clarity, UA 02/02/2024 Clear   Final   • Specific Gravity, UA 02/02/2024 1.010  1.003 - 1.030 Final   • pH, UA 02/02/2024 7.5  4.5, 5.0, 5.5, 6.0, 6.5, 7.0, 7.5, 8.0 Final   • Leukocytes, UA 02/02/2024 Negative  Negative Final   • Nitrite, UA 02/02/2024 Negative  Negative Final   • Protein, UA 02/02/2024 Trace (A)  Negative mg/dl Final   • Glucose, UA 02/02/2024 Negative  Negative mg/dl Final   • Ketones, UA 02/02/2024 Negative  Negative mg/dl Final   • Urobilinogen, UA 02/02/2024 <2.0  <2.0 mg/dl mg/dl Final   • Bilirubin, UA 02/02/2024 Negative  Negative Final   • Occult Blood, UA 02/02/2024 Negative  Negative Final   • RBC, UA 02/02/2024 4-10 (A)  None Seen, 1-2 /hpf Final   • WBC, UA 02/02/2024 1-2  None Seen, 1-2 /hpf Final   • Epithelial Cells 02/02/2024 Occasional  None Seen, Occasional /hpf Final   • Bacteria, UA 02/02/2024 Occasional  None Seen, Occasional /hpf Final   • MUCUS THREADS 02/02/2024 Occasional (A)  None  Seen Final   • Amorphous Crystals, UA 02/02/2024 Occasional   Final

## 2024-02-16 NOTE — PROGRESS NOTES
[facility]        NAME: Jose Raza is a 73 y.o. male  : 1950    MRN: [id number]  DATE: February 15, 2024  TIME: 7:50 PM    Assessment and Plan   Burning sensation of throat [R07.0]  1. Burning sensation of throat              Patient Instructions       Follow up with PCP  Consider michael SHAHI consult   Proceed to  ER if symptoms worsen.    Chief Complaint     Chief Complaint   Patient presents with    Sore Throat     Sore throat for 2 - 3 weeks but getting worse.         History of Present Illness       HPI  Reports sore throat for 2-3 weeks. Getting worse. Previous Hx of GERD and was on PPI for several years. Those started causing health complications and so he stopped them. States he has been suffering with sore throat for a while and in the past 2-3 weeks is getting worse.     Review of Systems   Review of Systems   Constitutional:  Negative for fever.   HENT:  Positive for sore throat.    Respiratory:  Negative for cough and wheezing.    Gastrointestinal:  Negative for abdominal pain.         Current Medications       Current Outpatient Medications:     cycloSPORINE (Restasis) 0.05 % ophthalmic emulsion, Administer 1 drop to both eyes every 12 (twelve) hours, Disp: , Rfl:     MAGNESIUM CARBONATE PO, Take 1 tablet by mouth in the morning, Disp: , Rfl:     NON FORMULARY, Triple magnesium (glycinate 10%, oxide 60%, malate 30%,), Disp: , Rfl:     NON FORMULARY, Pycnogenol supplement, Disp: , Rfl:     Omega-3 Fatty Acids (fish oil) 1,000 mg, Take 2,000 mg by mouth daily, Disp: , Rfl:     Pyridoxine HCl (Vitamin B6) 100 MG TABS, , Disp: , Rfl:     tamsulosin (FLOMAX) 0.4 mg, Take 1 capsule (0.4 mg total) by mouth daily with dinner, Disp: 30 capsule, Rfl: 0    acetaminophen (TYLENOL) 650 mg CR tablet, Take 1 tablet (650 mg total) by mouth every 8 (eight) hours as needed for mild pain, Disp: 30 tablet, Rfl: 0    ibuprofen (MOTRIN) 400 mg tablet, Take 1 tablet (400 mg total) by mouth every 6 (six) hours  as needed for mild pain for up to 7 days, Disp: 28 tablet, Rfl: 0    ondansetron (ZOFRAN) 4 mg tablet, Take 1 tablet (4 mg total) by mouth every 6 (six) hours for 14 days, Disp: 12 tablet, Rfl: 0    polyethylene glycol (GLYCOLAX) 17 GM/SCOOP powder, Take 17 g by mouth daily, Disp: 510 g, Rfl: 0    Current Allergies     Allergies as of 02/15/2024    (Not on File)            The following portions of the patient's history were reviewed and updated as appropriate: allergies, current medications, past family history, past medical history, past social history, past surgical history and problem list.     History reviewed. No pertinent past medical history.    Past Surgical History:   Procedure Laterality Date    HERNIA REPAIR         Family History   Problem Relation Age of Onset    Brain cancer Mother     Aneurysm Father          Medications have been verified.        Objective   BP (!) 160/105   Temp 98.2 °F (36.8 °C) (Temporal)   Resp 18   SpO2 97%   No LMP for male patient.       Physical Exam     Physical Exam  HENT:      Right Ear: Tympanic membrane normal.      Left Ear: Tympanic membrane normal.      Nose: No rhinorrhea.      Mouth/Throat:      Pharynx: No pharyngeal swelling or posterior oropharyngeal erythema.      Tonsils: No tonsillar exudate. 0 on the right. 0 on the left.   Cardiovascular:      Rate and Rhythm: Regular rhythm.   Pulmonary:      Breath sounds: Normal breath sounds.

## 2024-02-19 ENCOUNTER — TELEPHONE (OUTPATIENT)
Dept: INTERNAL MEDICINE CLINIC | Age: 74
End: 2024-02-19

## 2024-02-19 DIAGNOSIS — S22.000A COMPRESSION FRACTURE OF BODY OF THORACIC VERTEBRA (HCC): ICD-10-CM

## 2024-02-19 DIAGNOSIS — S32.050A COMPRESSION FRACTURE OF L5 VERTEBRA, INITIAL ENCOUNTER (HCC): ICD-10-CM

## 2024-02-19 DIAGNOSIS — M54.50 ACUTE LEFT-SIDED LOW BACK PAIN WITHOUT SCIATICA: Primary | ICD-10-CM

## 2024-02-19 NOTE — TELEPHONE ENCOUNTER
Patient's wife called to have the referral for Spine and Pain faxed over to [facility] at [phone].    Are you able to put in a new referral for him so I can fax it over to them along with the office notes to them.  Thank you    I will call the patient to let him know that we will be doing this once the referral is placed.  If you can't find it in referrals just put it underneath outside of [facility] and see if that helps.  Not sure how it works for the providers on this issue

## 2024-02-21 ENCOUNTER — APPOINTMENT (OUTPATIENT)
Dept: PHYSICAL THERAPY | Age: 74
End: 2024-02-21
Payer: MEDICARE

## 2024-02-21 NOTE — ED ATTENDING ATTESTATION
2/2/2024  I, Rafael Chao MD, saw and evaluated the patient. I have discussed the patient with the resident/non-physician practitioner and agree with the resident's/non-physician practitioner's findings, Plan of Care, and MDM as documented in the resident's/non-physician practitioner's note, except where noted. All available labs and Radiology studies were reviewed.  I was present for key portions of any procedure(s) performed by the resident/non-physician practitioner and I was immediately available to provide assistance.       At this point I agree with the current assessment done in the Emergency Department.  I have conducted an independent evaluation of this patient a history and physical is as follows:    ED Course     Patient presents for evaluation due to left flank pain and difficulty urinating.  Patient was evaluated by his PCP who ordered an outpatient CT scan which showed a large right-sided calculus at the UP junction and an age-indeterminate compression fracture of T12 and L5.  Patient did report having sudden onset of back pain 1 month ago when he was shoveling snow.  No additional complaints.  Exam: AAOx3, NAD, RRR, CTA, S/NT/ND, no motor/sensory deficits. A/P: Left flank pain.  Discussed CT findings with patient.  Given size of right-sided stone as well as left-sided symptoms, I do not believe that this is causing any problems at this time.  Patient does have some difficulty urinating and has a postvoid residual of 400.  Will plan to place Dobbs and check urine to evaluate for infection.  On certain significance of lumbar compression fractures.  Recommend ongoing physical therapy and outpatient follow-up.    Critical Care Time  Procedures

## 2024-02-21 NOTE — TELEPHONE ENCOUNTER
Will fax over the ambulatory referral and take out the name on the form.  Will fax over the visits and pt that he has had done by [facility].    Will fax it to the number the wife given on the message

## 2024-02-22 ENCOUNTER — HOSPITAL ENCOUNTER (OUTPATIENT)
Dept: RADIOLOGY | Facility: IMAGING CENTER | Age: 74
End: 2024-02-22
Payer: MEDICARE

## 2024-02-22 ENCOUNTER — APPOINTMENT (OUTPATIENT)
Dept: LAB | Age: 74
End: 2024-02-22
Payer: MEDICARE

## 2024-02-22 DIAGNOSIS — S32.050A COMPRESSION FRACTURE OF L5 VERTEBRA, INITIAL ENCOUNTER (HCC): ICD-10-CM

## 2024-02-22 DIAGNOSIS — S22.080A COMPRESSION FRACTURE OF T12 VERTEBRA, INITIAL ENCOUNTER (HCC): ICD-10-CM

## 2024-02-22 LAB
FERRITIN SERPL-MCNC: 130 NG/ML (ref 24–336)
IRON SATN MFR SERPL: 32 % (ref 15–50)
IRON SERPL-MCNC: 94 UG/DL (ref 50–212)
PSA SERPL-MCNC: 0.48 NG/ML (ref 0–4)
TIBC SERPL-MCNC: 294 UG/DL (ref 250–450)
UIBC SERPL-MCNC: 200 UG/DL (ref 155–355)

## 2024-02-22 PROCEDURE — 77080 DXA BONE DENSITY AXIAL: CPT

## 2024-02-22 PROCEDURE — 36415 COLL VENOUS BLD VENIPUNCTURE: CPT

## 2024-02-22 PROCEDURE — 82728 ASSAY OF FERRITIN: CPT

## 2024-02-22 PROCEDURE — G0103 PSA SCREENING: HCPCS

## 2024-02-22 PROCEDURE — 83540 ASSAY OF IRON: CPT

## 2024-02-22 PROCEDURE — 83550 IRON BINDING TEST: CPT

## 2024-02-23 ENCOUNTER — TELEPHONE (OUTPATIENT)
Age: 74
End: 2024-02-23

## 2024-02-23 ENCOUNTER — TELEMEDICINE (OUTPATIENT)
Age: 74
End: 2024-02-23
Payer: MEDICARE

## 2024-02-23 DIAGNOSIS — S32.050D COMPRESSION FRACTURE OF L5 VERTEBRA WITH ROUTINE HEALING, SUBSEQUENT ENCOUNTER: ICD-10-CM

## 2024-02-23 DIAGNOSIS — K59.09 OTHER CONSTIPATION: ICD-10-CM

## 2024-02-23 DIAGNOSIS — S22.000A COMPRESSION FRACTURE OF BODY OF THORACIC VERTEBRA (HCC): ICD-10-CM

## 2024-02-23 DIAGNOSIS — M80.00XD AGE-RELATED OSTEOPOROSIS WITH CURRENT PATHOLOGICAL FRACTURE WITH ROUTINE HEALING, SUBSEQUENT ENCOUNTER: Primary | ICD-10-CM

## 2024-02-23 PROBLEM — M80.00XA AGE-RELATED OSTEOPOROSIS WITH CURRENT PATHOLOGICAL FRACTURE: Status: ACTIVE | Noted: 2024-02-23

## 2024-02-23 PROCEDURE — 99213 OFFICE O/P EST LOW 20 MIN: CPT

## 2024-02-23 RX ORDER — MULTIVITAMIN
TABLET ORAL
COMMUNITY

## 2024-02-23 RX ORDER — PRASTERONE (DHEA) 50 MG
CAPSULE ORAL
COMMUNITY

## 2024-02-23 RX ORDER — RISEDRONATE SODIUM 35 MG/1
35 TABLET, FILM COATED ORAL
Qty: 12 TABLET | Refills: 1 | Status: SHIPPED | OUTPATIENT
Start: 2024-02-23

## 2024-02-23 NOTE — ASSESSMENT & PLAN NOTE
Still noting significant pain from fractures, however, not interested in taking tramadol which was previously prescribed  Currently taking Aleve, using back brace, heating pads  Advised patient he can also take Tylenol for pain

## 2024-02-23 NOTE — ASSESSMENT & PLAN NOTE
Patient with chronic constipation, per patient's wife, prefer holistic approach  States that he has been going 2 to 3 days without bowel movements, did have bowel movement today  Patient does note his appetite has been decreased due to pain  Advised patient and his wife that due to decrease in appetite, it is normal to go 1 to 2 days without a bowel movement.  Patient should increase fiber in diet, increase hydration to 60 to 80 ounces of water daily  Also advised he can use Colace daily to soften stools  If going 2 to 3 days without a bowel movement, he can use MiraLAX or milk of magnesia  Call office if not having bowel movement after approximately 5 to 6 days despite over-the-counter therapies

## 2024-02-23 NOTE — TELEPHONE ENCOUNTER
Incoming call: Alicia pt's wife     Referral for rheum - dx osteoporosis, compression fractures   Wears back brace, heating pad, sleeps with pillow under knees  Takes aleve for pain     Dispo:  Made new pt appt for April

## 2024-02-23 NOTE — PROGRESS NOTES
Virtual Regular Visit    Verification of patient location:    Patient is located at Home in the following state in which I hold an active license [place]      Assessment/Plan:    Problem List Items Addressed This Visit          Musculoskeletal and Integument    Compression fracture of body of thoracic vertebra (HCC)     Still noting significant pain from fractures, however, not interested in taking tramadol which was previously prescribed  Currently taking Aleve, using back brace, heating pads  Advised patient he can also take Tylenol for pain             Compression fracture of fifth lumbar vertebra (HCC)    Age-related osteoporosis with current pathological fracture - Primary     DEXA scan completed 2- showing osteoporosis with he 10 year risk of hip fracture is 15% with the 10 year risk of major osteoporotic fracture being 26%   Discussed results with patient and his wife  Discussed options for therapy including Prolia or Evenity injections.  Patient states that he is not open to injections at this time  Discussed oral bisphosphonates with patient.  Will prescribe risedronate 35 mg weekly.  Advised to take with plenty of water  Repeat DEXA scan in 1 year  Patient also requesting referral to rheumatology which was supplied at this time         Relevant Medications    risedronate (ACTONEL) 35 mg tablet    Other Relevant Orders    Ambulatory Referral to Rheumatology    Calcium, urine, 24 hour       Other    Other constipation     Patient with chronic constipation, per patient's wife, prefer holistic approach  States that he has been going 2 to 3 days without bowel movements, did have bowel movement today  Patient does note his appetite has been decreased due to pain  Advised patient and his wife that due to decrease in appetite, it is normal to go 1 to 2 days without a bowel movement.  Patient should increase fiber in diet, increase hydration to 60 to 80 ounces of water daily  Also advised he can use Colace daily  to soften stools  If going 2 to 3 days without a bowel movement, he can use MiraLAX or milk of magnesia  Call office if not having bowel movement after approximately 5 to 6 days despite over-the-counter therapies                 Reason for visit is   Chief Complaint   Patient presents with    Follow-up     Patient would like to get into rheumatology through [facility]. Has an appt with [facility] in April, [facility]. patient is in a lot of pain from compression fx, had Dexa Scan that shows very bad Osteoporosis and would like to get info on what to do. They don't want Pain medication due to the cause of Constipation and would be bad on the back. Only doing Advil for it. Just wants to get advise on other stuff for him, Patient does wear a back brace from time to time, patients wife     Constipation     Patient has tried over the counter medications, watermelon smoothies,     Virtual Regular Visit          Encounter provider Denise Rivas PA-C    Provider located at [facility] [place] [facility] [place] [address] [place] [address] [phone]      Recent Visits  No visits were found meeting these conditions.  Showing recent visits within past 7 days and meeting all other requirements  Today's Visits  Date Type Provider Dept   02/23/24 Telemedicine Denise Rivas PA-C [facility]   Showing today's visits and meeting all other requirements  Future Appointments  No visits were found meeting these conditions.  Showing future appointments within next 150 days and meeting all other requirements       The patient was identified by name and date of birth. Jose Raza was informed that this is a telemedicine visit and that the visit is being conducted through the [employer] platform. He agrees to proceed..  My office door was closed. No one else was in the room.  He acknowledged consent and  understanding of privacy and security of the video platform. The patient has agreed to participate and understands they can discontinue the visit at any time.    Patient is aware this is a billable service.     Subjective  Jose Raza is a 73 y.o. male .      Patient is a 73-year-old male presenting to the office via virtual visit for review of DEXA scan results  He also was recently diagnosed with compression fractures of the thoracic vertebrae and fifth lumbar vertebrae    Osteoporosis   DEXA scan showed -3.8 T-score in the spine and femoral neck  Discussed with patient diagnosis of osteoporosis  Discussed options for further therapy, patient's wife states that he would not be interested in injections due to unknown side effects  Of note, patient recently did have a kidney stone which was likely a calcium stone, however it was never analyzed    Constipation  Patient notes decreased appetite and activity since diagnosis with spine fractures  Notes he has been walking some  Endorses no bowel movement yesterday or the day prior, however did have a bowel movement today  Not interested in any pain medications for vertebral fractures due to side effects of constipation  Tx: magnesium supplements, smoothies, stomach massage  Patient's wife is interested in GI referral due to history of Tsai's esophagus as well as his constipation           Past Medical History:   Diagnosis Date    Memory loss        Past Surgical History:   Procedure Laterality Date    HERNIA REPAIR      KNEE SURGERY         Current Outpatient Medications   Medication Sig Dispense Refill    cycloSPORINE (Restasis) 0.05 % ophthalmic emulsion Administer 1 drop to both eyes every 12 (twelve) hours      Fide 500 MG CAPS Take by mouth 500 mg daily      MAGNESIUM CARBONATE PO Take 1 tablet by mouth in the morning      Multiple Vitamin (Multi Vitamin Daily) TABS Take by mouth      NON FORMULARY Triple magnesium (glycinate 10%, oxide 60%, malate 30%,)       NON FORMULARY Pycnogenol supplement      Omega-3 Fatty Acids (fish oil) 1,000 mg Take 2,000 mg by mouth daily      patient supplied medication Take 1 each by mouth daily One probiotic daily      Pyridoxine HCl (Vitamin B6) 100 MG TABS       risedronate (ACTONEL) 35 mg tablet Take 1 tablet (35 mg total) by mouth every 7 days with water on empty stomach, nothing by mouth or lie down for next 30 minutes. 12 tablet 1    tamsulosin (FLOMAX) 0.4 mg Take 1 capsule (0.4 mg total) by mouth daily with dinner 30 capsule 0     No current facility-administered medications for this visit.        No Known Allergies    Review of Systems   Constitutional:  Negative for chills, fatigue and fever.   Respiratory:  Negative for cough, chest tightness, shortness of breath and wheezing.    Cardiovascular:  Negative for chest pain and palpitations.   Gastrointestinal:  Positive for constipation. Negative for abdominal pain, diarrhea, nausea and vomiting.   Musculoskeletal:  Positive for back pain and gait problem.   Neurological:  Negative for dizziness, weakness, light-headedness, numbness and headaches.   All other systems reviewed and are negative.      Video Exam    There were no vitals filed for this visit.    Physical Exam  Vitals and nursing note reviewed.   Constitutional:       General: He is not in acute distress.     Appearance: Normal appearance. He is not ill-appearing.      Comments: Patient appears to be resting comfortably on couch throughout virtual visit   HENT:      Head: Normocephalic.      Mouth/Throat:      Mouth: Mucous membranes are moist.   Eyes:      General: No scleral icterus.        Right eye: No discharge.         Left eye: No discharge.      Conjunctiva/sclera: Conjunctivae normal.   Pulmonary:      Effort: Pulmonary effort is normal. No respiratory distress.   Skin:     Coloration: Skin is not pale.   Neurological:      Mental Status: He is alert and oriented to person, place, and time. Mental status is  at baseline.   Psychiatric:         Mood and Affect: Mood normal.         Behavior: Behavior normal.          Visit Time  Total Visit Duration: 20 minutes

## 2024-02-23 NOTE — ASSESSMENT & PLAN NOTE
DEXA scan completed 2- showing osteoporosis with he 10 year risk of hip fracture is 15% with the 10 year risk of major osteoporotic fracture being 26%   Discussed results with patient and his wife  Discussed options for therapy including Prolia or Evenity injections.  Patient states that he is not open to injections at this time  Discussed oral bisphosphonates with patient.  Will prescribe risedronate 35 mg weekly.  Advised to take with plenty of water  Repeat DEXA scan in 1 year  Patient also requesting referral to rheumatology which was supplied at this time

## 2024-02-24 ENCOUNTER — APPOINTMENT (OUTPATIENT)
Dept: LAB | Age: 74
End: 2024-02-24

## 2024-02-26 ENCOUNTER — TELEPHONE (OUTPATIENT)
Dept: INTERNAL MEDICINE CLINIC | Age: 74
End: 2024-02-26

## 2024-02-26 ENCOUNTER — OFFICE VISIT (OUTPATIENT)
Dept: UROLOGY | Facility: AMBULATORY SURGERY CENTER | Age: 74
End: 2024-02-26
Payer: MEDICARE

## 2024-02-26 VITALS
HEART RATE: 67 BPM | DIASTOLIC BLOOD PRESSURE: 74 MMHG | OXYGEN SATURATION: 98 % | WEIGHT: 156 LBS | SYSTOLIC BLOOD PRESSURE: 112 MMHG | BODY MASS INDEX: 22.33 KG/M2 | HEIGHT: 70 IN | RESPIRATION RATE: 17 BRPM

## 2024-02-26 DIAGNOSIS — N20.0 NEPHROLITHIASIS: Primary | ICD-10-CM

## 2024-02-26 DIAGNOSIS — S22.000A COMPRESSION FRACTURE OF BODY OF THORACIC VERTEBRA (HCC): ICD-10-CM

## 2024-02-26 DIAGNOSIS — N13.2 HYDRONEPHROSIS WITH OBSTRUCTING CALCULUS: ICD-10-CM

## 2024-02-26 DIAGNOSIS — R33.9 URINARY RETENTION: ICD-10-CM

## 2024-02-26 PROCEDURE — 99214 OFFICE O/P EST MOD 30 MIN: CPT

## 2024-02-26 RX ORDER — TAMSULOSIN HYDROCHLORIDE 0.4 MG/1
0.4 CAPSULE ORAL
Qty: 90 CAPSULE | Refills: 3 | Status: SHIPPED | OUTPATIENT
Start: 2024-02-26 | End: 2025-02-20

## 2024-02-26 RX ORDER — CEFAZOLIN SODIUM 1 G/50ML
1000 SOLUTION INTRAVENOUS ONCE
OUTPATIENT
Start: 2024-02-26 | End: 2024-02-26

## 2024-02-26 NOTE — TELEPHONE ENCOUNTER
Patient wife called and is asking if she should  the risedronate medication or wait until the 24 hour urine test results come back to decide if he needs the injection instead of the tablet to see if he is absorbing it.     Please advise    Please send back to clinical pool    Thank you

## 2024-02-26 NOTE — PROGRESS NOTES
Office Visit- Urology  Jose Raza 1950 MRN: [id number]      Assessment/Discussion/Plan    73 y.o. male managed by     Nephrolithiasis  -CT scan from 2/2/2024 demonstrated a 2.2 x 1.3 cm stone in the right renal pelvis near the UPJ with resultant mild hydronephrosis  -Discussed that I would advise surgical intervention.  Did discuss option of PCNL versus ureteroscopy.  At this point in time patient like to proceed with ureteroscopy  -Discussed cystoscopy, ureteroscopy with holmium laser lithotripsy, basket stone extraction, retrograde pyelogram, insertion of ureteral stent.  -Discussed risk including infection including sepsis, bleeding, injury to the urethra, bladder, ureters, kidneys, ureteral/urethral stricture, need for additional procedures, chance of incomplete stone clearance, stent related discomfort.  Patient expressed understanding  -Informed consent signed in office today.  Was given to clerical staff to scan to surgical coordinator  -Case request placed  -Proceed to the OR    2. History of urinary retention  -Successfully passed trial of void on 2/12/2024   -continue with use of Flomax    3.Osteoporosis  -Patient with compression fractures of the spine and significant nephrolithiasis  -Obtainment of PTH.  Obtainment of testosterone testing      Chief Complaint:   Jose is a 73 y.o. male presenting to the office for an initial evaluation regarding nephrolithiasis        Subjective    Patient is a 73-year-old male with past urologic history of BPH with urinary tract symptoms, chronic prostatitis who [facility] who presents to the office today to establish care and for evaluation of nephrolithiasis.  He presented to the ER on 2/2/2024 for evaluation of left flank pain and difficulty with urination.  He had a PVR which demonstrated acute urinary retention and a Dobbs catheter was placed.  He also had a CT scan which demonstrated a 2.2 x 1.3 cm calculus in the right renal  pelvis millimeter UPJ resulting in mild hydronephrosis as well as new compression fractures of T12 and L5.  He has since passed a trial of void on 2/12/2024.  He has been placed on Flomax 0.4 mg.  He states that he has pain from his back and is sometimes unaware if it is coming from kidney stone or back pain/involvement of compression fractures.  He denies any significant cardiac or pulmonary history.  He does not utilize any aspirin or anticoagulation.  No previous diagnosis of sleep apnea.  He denies any personal complications with anesthesia.  No current tobacco use, alcohol use, recreational drug use      AUA SYMPTOM SCORE      Flowsheet Row Most Recent Value   AUA SYMPTOM SCORE    How often have you had a sensation of not emptying your bladder completely after you finished urinating? 0 (P)    How often have you had to urinate again less than two hours after you finished urinating? 1 (P)    How often have you found you stopped and started again several times when you urinate? 0 (P)    How often have you found it difficult to postpone urination? 0 (P)    How often have you had a weak urinary stream? 0 (P)    How often have you had to push or strain to begin urination? 0 (P)    How many times did you most typically get up to urinate from the time you went to bed at night until the time you got up in the morning? 5 (P)    Quality of Life: If you were to spend the rest of your life with your urinary condition just the way it is now, how would you feel about that? 2 (P)    AUA SYMPTOM SCORE 6 (P)             ROS:   Review of Systems   Constitutional: Negative.  Negative for chills, fatigue and fever.   HENT: Negative.     Respiratory:  Negative for shortness of breath.    Cardiovascular:  Negative for chest pain.   Gastrointestinal: Negative.  Negative for abdominal pain.   Endocrine: Negative.    Musculoskeletal: Negative.    Skin: Negative.    Neurological: Negative.  Negative for dizziness and light-headedness.    Hematological: Negative.    Psychiatric/Behavioral: Negative.           Past Medical History  Past Medical History:   Diagnosis Date    Memory loss        Past Surgical History  Past Surgical History:   Procedure Laterality Date    HERNIA REPAIR      KNEE SURGERY         Past Family History  Family History   Problem Relation Age of Onset    Brain cancer Mother     Cancer Mother     Aneurysm Father        Past Social history  Social History     Socioeconomic History    Marital status: /Civil Union     Spouse name: Not on file    Number of children: Not on file    Years of education: Not on file    Highest education level: Not on file   Occupational History    Not on file   Tobacco Use    Smoking status: Former     Current packs/day: 0.00     Types: Cigarettes     Start date:      Quit date:      Years since quittin.1    Smokeless tobacco: Never    Tobacco comments:     Social    Vaping Use    Vaping status: Never Used   Substance and Sexual Activity    Alcohol use: Not Currently    Drug use: Never    Sexual activity: Yes     Partners: Female   Other Topics Concern    Not on file   Social History Narrative    Not on file     Social Determinants of Health     Financial Resource Strain: Low Risk  (2023)    Received from [facility]    Overall Financial Resource Strain (CARDIA)     Difficulty of Paying Living Expenses: Not hard at all   Food Insecurity: No Food Insecurity (2023)    Received from [facility]    Hunger Vital Sign     Worried About Running Out of Food in the Last Year: Never true     Ran Out of Food in the Last Year: Never true   Transportation Needs: No Transportation Needs (2023)    Received from [facility]    PRAPARE - Transportation     Lack of Transportation (Medical): No     Lack of Transportation (Non-Medical): No   Physical Activity: Not on file   Stress: No Stress Concern Present (2023)    Received from  [facility]    [employer] [place] [employer] - Occupational Stress Questionnaire     Feeling of Stress : Only a little   Social Connections: Socially Integrated (6/13/2023)    Received from [facility]    Social Connection and Isolation Panel [NHANES]     Frequency of Communication with Friends and Family: More than three times a week     Frequency of Social Gatherings with Friends and Family: More than three times a week     Attends Congregational Services: More than 4 times per year     Active Member of Clubs or Organizations: Yes     Attends Club or Organization Meetings: Never     Marital Status:    Intimate Partner Violence: Not At Risk (6/13/2023)    Received from [facility]    Humiliation, Afraid, Rape, and Kick questionnaire     Fear of Current or Ex-Partner: No     Emotionally Abused: No     Physically Abused: No     Sexually Abused: No   Housing Stability: Low Risk  (6/13/2023)    Received from [facility]    Housing Stability Vital Sign     Unable to Pay for Housing in the Last Year: No     Number of Places Lived in the Last Year: 1     Unstable Housing in the Last Year: No       Current Medications  Current Outpatient Medications   Medication Sig Dispense Refill    cycloSPORINE (Restasis) 0.05 % ophthalmic emulsion Administer 1 drop to both eyes every 12 (twelve) hours      Fide 500 MG CAPS Take by mouth 500 mg daily      MAGNESIUM CARBONATE PO Take 1 tablet by mouth in the morning      Multiple Vitamin (Multi Vitamin Daily) TABS Take by mouth      NON FORMULARY Triple magnesium (glycinate 10%, oxide 60%, malate 30%,)      NON FORMULARY Pycnogenol supplement      Omega-3 Fatty Acids (fish oil) 1,000 mg Take 2,000 mg by mouth daily      patient supplied medication Take 1 each by mouth daily One probiotic daily      Pyridoxine HCl (Vitamin B6) 100 MG TABS       risedronate (ACTONEL) 35 mg tablet Take 1 tablet (35 mg total)  by mouth every 7 days with water on empty stomach, nothing by mouth or lie down for next 30 minutes. 12 tablet 1    tamsulosin (FLOMAX) 0.4 mg Take 1 capsule (0.4 mg total) by mouth daily with dinner 30 capsule 0     No current facility-administered medications for this visit.       Allergies  No Known Allergies    OBJECTIVE    Vitals   There were no vitals filed for this visit.    PVR:    Physical Exam  Constitutional:       General: He is not in acute distress.     Appearance: Normal appearance. He is normal weight. He is not ill-appearing or toxic-appearing.   HENT:      Head: Normocephalic and atraumatic.   Eyes:      Conjunctiva/sclera: Conjunctivae normal.   Cardiovascular:      Rate and Rhythm: Normal rate and regular rhythm.      Pulses: Normal pulses.   Pulmonary:      Effort: Pulmonary effort is normal. No respiratory distress.      Breath sounds: No stridor. No wheezing or rales.   Abdominal:      Tenderness: There is no right CVA tenderness or left CVA tenderness.   Musculoskeletal:         General: Normal range of motion.      Cervical back: Normal range of motion and neck supple.   Skin:     General: Skin is warm and dry.   Neurological:      General: No focal deficit present.      Mental Status: He is alert and oriented to person, place, and time.      Cranial Nerves: No cranial nerve deficit.   Psychiatric:         Mood and Affect: Mood normal.         Behavior: Behavior normal.         Thought Content: Thought content normal.          Labs:     Lab Results   Component Value Date    PSA 0.48 02/22/2024     Lab Results   Component Value Date    CREATININE 0.69 02/05/2024      Lab Results   Component Value Date    HGBA1C 5.5 07/26/2022     Lab Results   Component Value Date    CALCIUM 9.1 02/05/2024    K 4.2 02/05/2024    CO2 30 02/05/2024    CL 99 02/05/2024    BUN 14 02/05/2024    CREATININE 0.69 02/05/2024       I have personally reviewed all pertinent lab results and reviewed with  patient    Imaging   T ABDOMEN AND PELVIS WITHOUT IV CONTRAST - LOW DOSE RENAL STONE     INDICATION: R35.0: Frequency of micturition  R10.9: Unspecified abdominal pain  R31.29: Other microscopic hematuria  R39.9: Unspecified symptoms and signs involving the genitourinary system.     COMPARISON: None.     TECHNIQUE: Low radiation dose thin section CT examination of the abdomen and pelvis was performed without intravenous or oral contrast according to a protocol specifically designed to evaluate for urinary tract calculus. Axial, sagittal, and coronal 2D   reformatted images were created from the source data and submitted for interpretation. Evaluation for pathology in the abdomen and pelvis that is unrelated to urinary tract calculi is limited.     Radiation dose length product (DLP) for this visit: 246 mGy-cm . This examination, like all CT scans performed in the [facility], was performed utilizing techniques to minimize radiation dose exposure, including the use of iterative   reconstruction and automated exposure control.     URINARY TRACT FINDINGS:     RIGHT KIDNEY AND URETER: Large calculus in the right renal pelvis near the ureteropelvic junction measuring 2.2 x 1.3 cm results in mild hydronephrosis. Multiple additional nonobstructing calculi in the right kidney measuring up to 5 mm. Right proximal   ureterectasis also noted without distal obstructing stone. Simple right renal cyst.     LEFT KIDNEY AND URETER: No urinary tract calculi. No hydronephrosis or hydroureter.     URINARY BLADDER: Unremarkable.        ADDITIONAL FINDINGS:     LOWER CHEST: No clinically significant abnormality in the visualized lower chest.     SOLID VISCERA: Limited low radiation dose noncontrast CT evaluation demonstrates no clinically significant abnormality of the imaged portions of the liver, spleen, pancreas, or adrenal glands.     GALLBLADDER/BILIARY TREE: No calcified gallstones. No pericholecystic inflammatory  change. No biliary dilation.     STOMACH AND BOWEL: Moderate colonic stool burden. No bowel obstruction.     APPENDIX: Normal.     ABDOMINOPELVIC CAVITY: No ascites. No pneumoperitoneum. No lymphadenopathy.     VESSELS: Atherosclerosis without abdominal aortic aneurysm.     REPRODUCTIVE ORGANS: Unremarkable for patient's age.     ABDOMINAL WALL/INGUINAL REGIONS: Unremarkable.     BONES: Moderate compression fracture of T12 with superior endplate irregularity and mild bony retropulsion. Moderate compression fracture of L5. Spinal degenerative changes.     IMPRESSION:     1. Large calculus in the right renal pelvis near the ureteropelvic junction measuring 2.2 x 1.3 cm results in mild hydronephrosis. Right proximal ureterectasis also noted without distal obstructing stone.     2. Multiple additional nonobstructing calculi in the right kidney measuring up to 5 mm.     3. No left obstructive uropathy or urinary tract calculi.        The study was marked in EPIC for immediate notification.           Workstation performed: [id number]       Celestine Garcia PA-C  Date: 2/26/2024 Time: 11:19 AM  [facility]    This note was written using fluency dictation software. Please excuse any resulting minor grammatical errors.

## 2024-02-26 NOTE — TELEPHONE ENCOUNTER
He should begin the medication.  At his visit on Friday, I was told that he is not open to starting injections such as Evenity or Prolia, which is why we chose oral medication.  Ultimately he needs the risedronate regardless, the urine test will allow us to determine if there is an additional medication needed

## 2024-02-27 ENCOUNTER — APPOINTMENT (OUTPATIENT)
Dept: LAB | Age: 74
End: 2024-02-27
Payer: MEDICARE

## 2024-02-27 ENCOUNTER — TELEPHONE (OUTPATIENT)
Dept: INTERNAL MEDICINE CLINIC | Age: 74
End: 2024-02-27

## 2024-02-27 ENCOUNTER — TELEPHONE (OUTPATIENT)
Dept: UROLOGY | Facility: AMBULATORY SURGERY CENTER | Age: 74
End: 2024-02-27

## 2024-02-27 DIAGNOSIS — M80.00XD AGE-RELATED OSTEOPOROSIS WITH CURRENT PATHOLOGICAL FRACTURE WITH ROUTINE HEALING, SUBSEQUENT ENCOUNTER: ICD-10-CM

## 2024-02-27 DIAGNOSIS — S22.000A COMPRESSION FRACTURE OF BODY OF THORACIC VERTEBRA (HCC): ICD-10-CM

## 2024-02-27 DIAGNOSIS — N20.0 NEPHROLITHIASIS: ICD-10-CM

## 2024-02-27 LAB
CALCIUM 24H UR-MCNC: 215.8 MG/24 HRS (ref 100–300)
PTH-INTACT SERPL-MCNC: 38.7 PG/ML (ref 12–88)
SPECIMEN VOL UR: 2600 ML
TESTOST SERPL-MSCNC: 401 NG/DL

## 2024-02-27 PROCEDURE — 82340 ASSAY OF CALCIUM IN URINE: CPT

## 2024-02-27 PROCEDURE — 83970 ASSAY OF PARATHORMONE: CPT

## 2024-02-27 PROCEDURE — 36415 COLL VENOUS BLD VENIPUNCTURE: CPT

## 2024-02-27 PROCEDURE — 84403 ASSAY OF TOTAL TESTOSTERONE: CPT

## 2024-02-27 NOTE — TELEPHONE ENCOUNTER
Spoke with patient's wife  She states that overnight, they were completing the 24-hour urine test, patient did not sleep well.  She also notes that he had blood work completed this morning, did not eat or drink until approximately 1 PM today  She states that she was gone in the morning, however when she came back she found him and he appeared to have dried blood around his nose.  He denies any falls, but states that he felt as though he blacked out  Denies any injuries any known injuries.  This episode was unwitnessed   Discussed with patient that it was unlikely that the medication caused this, however it was likely that combination of lack of sleep, having blood taken this morning, as well as not eating or drinking this morning related to patient's symptoms  Patient's wife advised to continue to monitor his symptoms call back if something changes  Will try taking risedronate again next week, however, if patient experiences symptoms again, stop medication and call the office.  Be sure to take with full glass of water and food  Patient's wife expressed understanding

## 2024-02-27 NOTE — TELEPHONE ENCOUNTER
----- Message from Celestine Garcia PA-C sent at 2/27/2024  1:05 PM EST -----  PTH within normal limits      Called patient to let him know that his PTH labs came back within normal limits. Patient's wife answered and stated that she understands everything and will let him know.

## 2024-02-27 NOTE — TELEPHONE ENCOUNTER
Pt's wife called stating that pt started risedronate 35mg this morning and pt blacked out, but pt did not eat anything as well. Pt's wife is wondering if this is a side effect of the medication. She would also like to know if they should stop the medication and restart it next week.

## 2024-02-27 NOTE — TELEPHONE ENCOUNTER
----- Message from Celestine Garcia PA-C sent at 2/27/2024 12:59 PM EST -----  Testosterone within normal limits.    Called patient he did not answer left detailed message to let him know that his testosterone came back within normal limits.I stated in the message to give us a call to back if he has any questions.

## 2024-03-04 ENCOUNTER — TELEPHONE (OUTPATIENT)
Dept: UROLOGY | Facility: MEDICAL CENTER | Age: 74
End: 2024-03-04

## 2024-03-04 NOTE — TELEPHONE ENCOUNTER
JOANNA Dimas; [place] [facility] days ago       Christiane: Large 2.2 cm right renal pelvis stone.  Will need high-powered laser informed consent signed and given to clerical staff to sign in.     clinical team please call patient to make him aware that I would like him to continue with Flomax.  Upon review of his chart he was utilizing Flomax for better bladder emptying.  So I like to continue on this long-term.   Sent in a  years worth of supply

## 2024-03-04 NOTE — TELEPHONE ENCOUNTER
I spoke to Mrs. Patterson, patient has untreated compression fractures (5) and is seeing [facility] tomorrow 3/5/2024. She will call after the appointment tomorrow to let us know if [facility] approves the patient being in the lithotomy position for #5 surgery.

## 2024-03-11 ENCOUNTER — OFFICE VISIT (OUTPATIENT)
Dept: GASTROENTEROLOGY | Facility: CLINIC | Age: 74
End: 2024-03-11
Payer: MEDICARE

## 2024-03-11 VITALS — BODY MASS INDEX: 22.82 KG/M2 | HEIGHT: 68 IN | WEIGHT: 150.6 LBS

## 2024-03-11 DIAGNOSIS — Z87.19 HISTORY OF BARRETT'S ESOPHAGUS: Primary | ICD-10-CM

## 2024-03-11 DIAGNOSIS — K21.9 GASTROESOPHAGEAL REFLUX DISEASE, UNSPECIFIED WHETHER ESOPHAGITIS PRESENT: ICD-10-CM

## 2024-03-11 DIAGNOSIS — Z12.11 SCREENING FOR COLON CANCER: ICD-10-CM

## 2024-03-11 DIAGNOSIS — S32.050A COMPRESSION FRACTURE OF L5 VERTEBRA, INITIAL ENCOUNTER (HCC): ICD-10-CM

## 2024-03-11 DIAGNOSIS — K63.8219 SMALL INTESTINAL BACTERIAL OVERGROWTH (SIBO): ICD-10-CM

## 2024-03-11 PROCEDURE — 99203 OFFICE O/P NEW LOW 30 MIN: CPT | Performed by: INTERNAL MEDICINE

## 2024-03-11 NOTE — PROGRESS NOTES
"[facility] - Outpatient Consultation  Jose Raza 73 y.o. male MRN: [id number]  Encounter: [id number]    HPI:    Jose Raza is a 73 y.o. male who presents to discuss several GI concerns including Tsai's esophagus, history of SIBO, history of esophageal candidiasis, constipation, and colon cancer screening.  He is in the office with his wife.  Referred by Dr. Cole.    The patient has been having memory problems recently and most of the history is provided by his wife.  He was diagnosed with Tsai's esophagus 20 years ago and was on PPI therapy for years.  I do not have endoscopy results, but his wife recalls being told that his Tsai's was short.  PPI was discontinued a few years ago after he was diagnosed with osteoporosis with lowest T-score of -3.8.    His wife also reports history of SIBO which has been diagnosed twice and was treated with rifaximin and neomycin last year.  She also reports history of  \"Candida overgrowth,\" which may be referring to esophageal candidiasis.  Currently, he complains of sore throat.  They have also been seeing an integrative medicine provider for symptoms of fatigue, memory loss, and onychomycosis, and apparently this doctor has been running blood tests for leaky gut and bacterial overgrowth.  I do not have these results and I do not know what the tests were.  The patient's wife says that their doctor told him that he had EBV infection and that the virus was \"actively replicating\" in her 's body.  Details are unclear.    He also has constipation and has been taking magnesium citrate and Senokot.    Other issues include kidney stones and recent problems with memory for which he will be seeing a neurologist.  No recent EGD.  Last colonoscopy was in 2015 and showed diverticulosis but no polyps.  No family history of colon cancer.    CBC from 2/5/2024 with hemoglobin 13.4, MCV 96.  With BMP from the same date normal.  LFTs from 2-24 " normal except for elevated alkaline phosphatase to 146.  Iron stores normal on 2024.    REVIEW OF SYSTEMS:  CONSTITUTIONAL: Denies any fever, chills, rigors, and weight loss.  HEENT: No earache or tinnitus. Denies hearing loss or visual disturbances.  CARDIOVASCULAR: No chest pain or palpitations.   RESPIRATORY: Denies any cough, hemoptysis, shortness of breath or dyspnea on exertion.  GASTROINTESTINAL: As noted in the History of Present Illness.   GENITOURINARY: No problems with urination. Denies any hematuria or dysuria.  NEUROLOGIC: No dizziness or vertigo, denies headaches.   MUSCULOSKELETAL: Denies any muscle or joint pain.   SKIN: Denies skin rashes or itching.   ENDOCRINE: Denies excessive thirst. Denies intolerance to heat or cold.  PSYCHOSOCIAL: Denies depression or anxiety. Denies any recent memory loss.     Historical Information   Past Medical History:   Diagnosis Date    Tsai esophagus ?    Took famotidine for years    Marleni Marcelo virus infection 2024    GERD (gastroesophageal reflux disease)     Memory loss     Osteoporosis      Past Surgical History:   Procedure Laterality Date    COLONOSCOPY      Within last 5 years    HERNIA REPAIR      KNEE SURGERY      UPPER GASTROINTESTINAL ENDOSCOPY       Social History   Social History     Substance and Sexual Activity   Alcohol Use Not Currently     Social History     Substance and Sexual Activity   Drug Use Never     Social History     Tobacco Use   Smoking Status Former    Current packs/day: 0.00    Types: Cigarettes    Start date:     Quit date: 1975    Years since quittin.2   Smokeless Tobacco Never   Tobacco Comments    Causal smoker as a young man 50 years ago     Family History   Problem Relation Age of Onset    Brain cancer Mother     Cancer Mother     Aneurysm Father        Meds/Allergies       Current Outpatient Medications:     cycloSPORINE (Restasis) 0.05 % ophthalmic emulsion    Fide 500 MG CAPS    MAGNESIUM  "CARBONATE PO    Multiple Vitamin (Multi Vitamin Daily) TABS    NON FORMULARY    NON FORMULARY    Omega-3 Fatty Acids (fish oil) 1,000 mg    patient supplied medication    Pyridoxine HCl (Vitamin B6) 100 MG TABS    risedronate (ACTONEL) 35 mg tablet    tamsulosin (FLOMAX) 0.4 mg    No Known Allergies    Objective   Height 5' 8\" (1.727 m), weight 68.3 kg (150 lb 9.6 oz). Body mass index is 22.9 kg/m².  PHYSICAL EXAM:    General Appearance:   Frail appearing, most questions answered and information provided by his wife   HEENT:   Normocephalic, atraumatic, anicteric, no thrush     Neck:  Supple, symmetrical, trachea midline   Lungs:   Clear to auscultation bilaterally; no rales, rhonchi or wheezing; respirations unlabored    Heart::   Regular rate and rhythm; no murmur, rub, or gallop.   Abdomen:   Soft, non-tender, non-distended; normal bowel sounds; no masses, no organomegaly    Genitalia:   Deferred    Rectal:   Deferred    Extremities:  No cyanosis, clubbing or edema    Pulses:  2+ and symmetric    Skin:  No jaundice, rashes, or lesions    Lymph nodes:  No palpable cervical lymphadenopathy        Lab Results:   No visits with results within 1 Day(s) from this visit.   Latest known visit with results is:   Appointment on 02/27/2024   Component Date Value    24H Urine Volume 02/27/2024 2,600     Calcium, 24H Urine 02/27/2024 215.8     PTH 02/27/2024 38.7     Testosterone 02/27/2024 401        Lab Results   Component Value Date    WBC 6.40 02/05/2024    HGB 13.4 02/05/2024    HCT 41.2 02/05/2024    MCV 96 02/05/2024     02/05/2024       Lab Results   Component Value Date    SODIUM 135 02/05/2024    K 4.2 02/05/2024    CL 99 02/05/2024    CO2 30 02/05/2024    AGAP 6 02/05/2024    BUN 14 02/05/2024    CREATININE 0.69 02/05/2024    GLUC 91 02/05/2024    CALCIUM 9.1 02/05/2024    AST 24 02/02/2024    ALT 17 02/02/2024    ALKPHOS 146 (H) 02/02/2024    TP 7.2 02/02/2024    TBILI 0.50 02/02/2024    EGFR 94 02/05/2024 " "      No results found for: \"CRP\"    Lab Results   Component Value Date    TSH 0.76 07/26/2022       Lab Results   Component Value Date    IRON 94 02/22/2024    TIBC 294 02/22/2024    FERRITIN 130 02/22/2024       Radiology Results:   MRI lumbar spine wo contrast    Result Date: 3/2/2024  Narrative: This result has an attachment that is not available. EXAM: MRI LUMBAR SPINE WO CONTRAST CLINICAL INDICATION: Evaluate for compression fracture at the T12 and L5 vertebral body.. TECHNIQUE: Sagittal T1-, T2-, and T2-w fat-saturated, and axial T1- and T2-w images of the lumbar spine. COMPARISON: No prior studies available comparisons. FINDINGS: Spine Numbering: For purposes of this dictation, it is assumed that there are 5 non-rib-bearing, lumbar-type vertebrae, and the most caudal fully segmented lumbar vertebra is labeled L5. Alignment: Normal Vertebral Bodies: Loss of vertebral body height noted at the T12, L1 and L5 vertebral body with approximately 50%, 65%, and 50% vertebral body height loss respectively. 0.4 cm bony retropulsion noted at the level of the L1 vertebral body. Fluid sing noted involving the L1 vertebral body, consistent with osteoporotic fracture. Marrow Signal: Marrow edema noted involving the L1 and L5 vertebral body with minimal marrow edema involving the T12 vertebral body. Intervertebral Discs: Multilevel disc dessication with loss of disc space height Conus Medullaris:  Terminates at L1-L2 Cauda Equina: Normal Paraspinal Soft Tissues: Simple T2 hyperintense renal cyst noted in the right kidney. No follow-up imaging is necessary. Individual Levels: Mild-to-moderate multilevel spondylosis of the lumbar spine. T12-L1: Mild diffuse disc bulge. No spinal canal stenosis. No neural foraminal stenosis. L1-L2: Mild diffuse disc bulge with bilateral facet arthropathy. No spinal canal stenosis. No neural foraminal narrowing. L2-L3:  Diffuse disc bulge with bilateral facet arthropathy. No spinal canal " stenosis. Mild bilateral neural foraminal narrowing. L3-L4:  Diffuse disc bulge with bilateral facet arthropathy. No spinal canal stenosis. Mild bilateral neural foraminal narrowing. L4-L5:  Diffuse disc bulge with bilateral facet arthropathy. No spinal canal stenosis. Moderate bilateral neural foraminal narrowing, left greater than right. L5-S1:  Diffuse disc bulge. No spinal canal stenosis. Moderate to severe bilateral neural foraminal narrowing.    Impression: IMPRESSION: 1.  Subacute osteoporotic compression fracture of the L1 and L5 vertebral body with 65% and 50% vertebral body height loss. 2.  Late subacute to chronic compression fracture of the T12 vertebral body with 50% vertebral body height loss.  3.  Mild-to-moderate multilevel spondylosis of the lumbar spine without significant spinal canal stenosis. There is bilateral moderate to severe neural foraminal narrowing at L5-S1. Workstation:[id number]    DXA bone density spine hip and pelvis    Result Date: 2/22/2024  Narrative: DXA SCAN CLINICAL HISTORY: 73-year-old male. OTHER RISK FACTORS: Prior fracture as a result of minor injury. PHARMACOLOGIC THERAPY FOR OSTEOPOROSIS:  None. TECHNIQUE: Bone densitometry was performed using a [employer] Horizon W bone densitometer.  Regions of interest appear properly placed. COMPARISON: There are no prior DXA studies performed on this unit for comparison. RESULTS: LUMBAR SPINE Level: L1-L4 : BMD: 0.633 gm/cm2 T-score: -3.8 LEFT  TOTAL HIP: BMD: 0.495 gm/cm2 T-score: -3.7 LEFT  FEMORAL NECK: BMD: 0.428 gm/cm2 T score: -3.8     Impression: 1. Osteoporosis. 2.  The 10 year risk of hip fracture is 15% with the 10 year risk of major osteoporotic fracture being 26% as calculated by the [employer] fracture risk assessment tool (FRAX, which is based on data generated by the WHO Collaborating [place] for Metabolic Bone Diseases). 3.  The current NOF guidelines recommend treating patients with a T-score of -2.5 or less  in the lumbar spine or hips, or in post-menopausal women and men over the age of 50 with low bone mass (osteopenia) and a FRAX 10 year risk score of >3% for hip fracture and/or >20% for major osteoporotic fracture. 4.  The NOF recommends follow-up DXA in 1-2 years after initiating therapy for osteoporosis and every 2 years thereafter. More frequent evaluation is appropriate for patients with conditions associated with rapid bone loss, such as glucocorticoid therapy. The interval between DXA screenings may be longer for individuals without major risk factors and initial T-score in the normal or upper low bone mass range. The FRAX algorithm has certain limitations: -FRAX has not been validated in patients currently or previously treated with pharmacotherapy for osteoporosis.  In such patients, clinical judgment must be exercised in interpreting FRAX scores. -Prior hip, vertebral and humeral fragility fractures appear to confer greater risk of subsequent fracture than fractures at other sites (this is especially true for individuals with severe vertebral fractures), but quantification of this incremental risk is not possible with FRAX. -FRAX underestimates fracture risk in patients with history of multiple fragility fractures. -FRAX may underestimate fracture risk in patients with history of frequent falls. -It is not appropriate to use FRAX to monitor treatment response. WHO CLASSIFICATION: Normal (a T-score of -1.0 or higher) Low bone mineral density (a T-score of less than -1.0 but higher than -2.5) Osteoporosis (a T-score of -2.5 or less) Severe osteoporosis (a T-score of -2.5 or less with a fragility fracture) Workstation performed: [id number]       ______________________________________________________________________  ASSESSMENT AND PLAN:    Jose Raza is a 73 y.o. male with history of Tsai's esophagus, GERD, prior diagnosis of SIBO, constipation, presenting to [facility].  He is experiencing recent  "memory loss, possible cognitive decline, and will be seeing neurology.  He has been seeing an integrative medicine doctor, and I am not sure about the workup he has had.  Some of this testing sounds nonstandard and possibly unapproved.  I am not sure what the EBV test was, for example.  We discussed that \"leaky gut\" is not a real diagnosis.  We also discussed the concept of SIBO.  It sounds like he may have had esophageal candidiasis, but I am not sure.  No thrush currently, but he complains of sore throat.  He his last colonoscopy was 9 years ago.  The patient's wife was reluctant to start new medications, particularly PPI because of the osteoporosis concern.  She would like me to review prior records before commencing with testing and treatments.  1.  We will try to get medical records including EGD reports and blood tests.  2.  I think that, given the diagnosis of Tsai's, he warrants EGD in the near future.  This will also be useful to rule out esophageal candidiasis.  3.  He will stay off PPI therapy for now.  4.  He should take vitamin D and calcium.  He is also on risedronate.  5.  He is also due for colon cancer screening, but this is something that we can discuss in the future.   6.  He will follow-up with neurology.  Return in 3 months.    1. History of Tsai's esophagus    2. Small intestinal bacterial overgrowth (SIBO)    3. Compression fracture of L5 vertebra, initial encounter ([facility])    4. Screening for colon cancer    5. Gastroesophageal reflux disease, unspecified whether esophagitis present        No orders of the defined types were placed in this encounter.    "

## 2024-03-12 NOTE — TELEPHONE ENCOUNTER
Pt's wife called to speak with SS     Wife stated that ortho cleared pt for surgery.     Please review     Alicia (Spouse)  [phone]

## 2024-03-14 ENCOUNTER — TELEPHONE (OUTPATIENT)
Age: 74
End: 2024-03-14

## 2024-03-15 NOTE — TELEPHONE ENCOUNTER
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    Pt call back: Wife Alicia  [phone]

## 2024-03-15 NOTE — TELEPHONE ENCOUNTER
I spoke to the patient's wife  and scheduled his procedure for 3/29/2024 at [facility] with Dr. Celis.    -instructions given verbally and Emailed  -patient aware to be NPO, needs a    -CBC, CMP, Urine C&S and EKG 2 weeks prior

## 2024-03-18 ENCOUNTER — APPOINTMENT (OUTPATIENT)
Dept: LAB | Age: 74
End: 2024-03-18
Payer: MEDICARE

## 2024-03-18 DIAGNOSIS — N20.0 NEPHROLITHIASIS: ICD-10-CM

## 2024-03-18 DIAGNOSIS — R39.89 SUSPECTED UTI: ICD-10-CM

## 2024-03-18 DIAGNOSIS — Z01.810 PRE-OPERATIVE CARDIOVASCULAR EXAMINATION: ICD-10-CM

## 2024-03-18 DIAGNOSIS — Z01.812 PRE-OPERATIVE LABORATORY EXAMINATION: ICD-10-CM

## 2024-03-18 LAB
ALBUMIN SERPL BCP-MCNC: 4.3 G/DL (ref 3.5–5)
ALP SERPL-CCNC: 120 U/L (ref 34–104)
ALT SERPL W P-5'-P-CCNC: 17 U/L (ref 7–52)
ANION GAP SERPL CALCULATED.3IONS-SCNC: 7 MMOL/L (ref 4–13)
AST SERPL W P-5'-P-CCNC: 20 U/L (ref 13–39)
BASOPHILS # BLD AUTO: 0.03 THOUSANDS/ÂΜL (ref 0–0.1)
BASOPHILS NFR BLD AUTO: 1 % (ref 0–1)
BILIRUB SERPL-MCNC: 0.6 MG/DL (ref 0.2–1)
BUN SERPL-MCNC: 10 MG/DL (ref 5–25)
CALCIUM SERPL-MCNC: 9.5 MG/DL (ref 8.4–10.2)
CHLORIDE SERPL-SCNC: 102 MMOL/L (ref 96–108)
CO2 SERPL-SCNC: 29 MMOL/L (ref 21–32)
CREAT SERPL-MCNC: 0.68 MG/DL (ref 0.6–1.3)
EOSINOPHIL # BLD AUTO: 0.06 THOUSAND/ÂΜL (ref 0–0.61)
EOSINOPHIL NFR BLD AUTO: 1 % (ref 0–6)
ERYTHROCYTE [DISTWIDTH] IN BLOOD BY AUTOMATED COUNT: 13.2 % (ref 11.6–15.1)
GFR SERPL CREATININE-BSD FRML MDRD: 95 ML/MIN/1.73SQ M
GLUCOSE P FAST SERPL-MCNC: 82 MG/DL (ref 65–99)
HCT VFR BLD AUTO: 43 % (ref 36.5–49.3)
HGB BLD-MCNC: 13.9 G/DL (ref 12–17)
IMM GRANULOCYTES # BLD AUTO: 0.01 THOUSAND/UL (ref 0–0.2)
IMM GRANULOCYTES NFR BLD AUTO: 0 % (ref 0–2)
LYMPHOCYTES # BLD AUTO: 1.14 THOUSANDS/ÂΜL (ref 0.6–4.47)
LYMPHOCYTES NFR BLD AUTO: 23 % (ref 14–44)
MCH RBC QN AUTO: 31 PG (ref 26.8–34.3)
MCHC RBC AUTO-ENTMCNC: 32.3 G/DL (ref 31.4–37.4)
MCV RBC AUTO: 96 FL (ref 82–98)
MONOCYTES # BLD AUTO: 0.39 THOUSAND/ÂΜL (ref 0.17–1.22)
MONOCYTES NFR BLD AUTO: 8 % (ref 4–12)
NEUTROPHILS # BLD AUTO: 3.3 THOUSANDS/ÂΜL (ref 1.85–7.62)
NEUTS SEG NFR BLD AUTO: 67 % (ref 43–75)
NRBC BLD AUTO-RTO: 0 /100 WBCS
PLATELET # BLD AUTO: 189 THOUSANDS/UL (ref 149–390)
PMV BLD AUTO: 10.3 FL (ref 8.9–12.7)
POTASSIUM SERPL-SCNC: 4.7 MMOL/L (ref 3.5–5.3)
PROT SERPL-MCNC: 6.6 G/DL (ref 6.4–8.4)
RBC # BLD AUTO: 4.48 MILLION/UL (ref 3.88–5.62)
SODIUM SERPL-SCNC: 138 MMOL/L (ref 135–147)
WBC # BLD AUTO: 4.93 THOUSAND/UL (ref 4.31–10.16)

## 2024-03-18 PROCEDURE — 87086 URINE CULTURE/COLONY COUNT: CPT

## 2024-03-18 PROCEDURE — 80053 COMPREHEN METABOLIC PANEL: CPT

## 2024-03-18 PROCEDURE — 36415 COLL VENOUS BLD VENIPUNCTURE: CPT

## 2024-03-18 PROCEDURE — 85025 COMPLETE CBC W/AUTO DIFF WBC: CPT

## 2024-03-19 LAB
ATRIAL RATE: 56 BPM
BACTERIA UR CULT: NORMAL
P AXIS: 50 DEGREES
PR INTERVAL: 144 MS
QRS AXIS: 15 DEGREES
QRSD INTERVAL: 76 MS
QT INTERVAL: 406 MS
QTC INTERVAL: 391 MS
T WAVE AXIS: 35 DEGREES
VENTRICULAR RATE: 56 BPM

## 2024-03-21 NOTE — PRE-PROCEDURE INSTRUCTIONS
Pre-Surgery Instructions:   Medication Instructions    cycloSPORINE (Restasis) 0.05 % ophthalmic emulsion Take day of surgery.    Ginger 500 MG CAPS Stop taking 7 days prior to surgery.    Multiple Vitamin (Multi Vitamin Daily) TABS Stop taking 7 days prior to surgery.    NON FORMULARY- triple magnesium Stop taking 7 days prior to surgery.    Omega-3 Fatty Acids (fish oil) 1,000 mg Stop taking 7 days prior to surgery.    patient supplied medication- probiotics Hold day of surgery.    Pyridoxine HCl (Vitamin B6) 100 MG TABS Stop taking 7 days prior to surgery.    risedronate (ACTONEL) 35 mg tablet Takes weekly on Sundays, can take 3/24 dose    tamsulosin (FLOMAX) 0.4 mg Take night before surgery   Medication instructions for day surgery reviewed. Please use only a sip of water to take your instructed medications. Avoid all over the counter vitamins, supplements and NSAIDS for one week prior to surgery per anesthesia guidelines. Tylenol is ok to take as needed.     You will receive a call one business day prior to surgery with an arrival time and hospital directions. If your surgery is scheduled on a Monday, the hospital will be calling you on the Friday prior to your surgery. If you have not heard from anyone by 8pm, please call the hospital supervisor through the hospital  at [phone]. ([place] [phone] or [place] [phone]).    Do not eat or drink anything after midnight the night before your surgery, including candy, mints, lifesavers, or chewing gum. Do not drink alcohol 24hrs before your surgery. Try not to smoke at least 24hrs before your surgery.       Follow the pre surgery showering instructions as listed in the “My Surgical Experience Booklet” or otherwise provided by your surgeon's office. Do not use a blade to shave the surgical area 1 week before surgery. It is okay to use a clean electric clippers up to 24 hours before surgery. Do not apply any lotions, creams, including makeup,  cologne, deodorant, or perfumes after showering on the day of your surgery. Do not use dry shampoo, hair spray, hair gel, or any type of hair products.     No contact lenses, eye make-up, or artificial eyelashes. Remove nail polish, including gel polish, and any artificial, gel, or acrylic nails if possible. Remove all jewelry including rings and body piercing jewelry.     Wear causal clothing that is easy to take on and off. Consider your type of surgery.    Keep any valuables, jewelry, piercings at home. Please bring any specially ordered equipment (sling, braces) if indicated.    Arrange for a responsible person to drive you to and from the hospital on the day of your surgery. Please confirm the visitor policy for the day of your procedure when you receive your phone call with an arrival time.     Call the surgeon's office with any new illnesses, exposures, or additional questions prior to surgery.    Please reference your “My Surgical Experience Booklet” for additional information to prepare for your upcoming surgery.

## 2024-03-24 NOTE — H&P
H&P Exam - Urology   Jose Raza 73 y.o. male MRN: [id number]  Unit/Bed#:  Encounter: [id number]    Assessment/Plan     Assessment:  Right renal calculi  Plan:  Cystoscopy right retrograde pyelogram right ureteroscopic laser lithotripsy stent insertion    History of Present Illness   HPI:  Jose Raza is a 73 y.o. male who presents with a large right renal calculus.  The patient has a past urologic history of BPH with urologic symptoms chronic prostatitis followed with [facility] in 2022.  The patient was seen in the emergency room for evaluation of left flank pain and difficulty with urination the patient was diagnosed with acute urinary retention and a Dobbs catheter was placed.  CT was performed which revealed a 2.2 x 1.3 cm in the right renal pelvis with mild hydronephrosis along with multiple additional nonobstructing stones in the right kidney measuring up to 5 mm.  Right proximal ureterectasis is also noted without a distal ureteral stone.  A simple right renal cyst is identified emanating from the lower pole of the kidney and may in fact be causing ureteral compression resulting in renal pelvic urinary stasis and possible stone formation.    I met the patient in the holding area and reviewed his CT with him.  I indicated that this is a very large stone and that ureteroscopic approach may not and probably will not render him stone free.  We did discuss the possibility of multiple procedures as well as the possibility of percutaneous procedures.  He understands risks of retained stone or stone fragments perforation contracture need for stent possible urinary retention possible need for ureteral or urethral catheter and the possible need for other operative interventions.  The patient agreed to the procedure providing verbal and signed informed consent.    Review of Systems    Historical Information   Past Medical History:   Diagnosis Date    Tsai esophagus 1998?    Took famotidine  for years    Compression fracture of body of thoracic vertebra (HCC) 2024    T12, L5, L1    Marleni Marcelo virus infection 2024    GERD (gastroesophageal reflux disease)     Kidney stone     Memory loss     Osteoporosis      Past Surgical History:   Procedure Laterality Date    COLONOSCOPY      Within last 5 years    HERNIA REPAIR      KNEE SURGERY      UPPER GASTROINTESTINAL ENDOSCOPY       Social History   Social History     Substance and Sexual Activity   Alcohol Use Not Currently     Social History     Substance and Sexual Activity   Drug Use Never     Social History     Tobacco Use   Smoking Status Former    Current packs/day: 0.00    Types: Cigarettes    Start date:     Quit date: 1975    Years since quittin.2   Smokeless Tobacco Never   Tobacco Comments    Causal smoker as a young man 50 years ago     Family History:   Family History   Problem Relation Age of Onset    Brain cancer Mother     Cancer Mother     Aneurysm Father        Meds/Allergies   all medications and allergies reviewed  No Known Allergies    Objective   Vitals: There were no vitals taken for this visit.    No intake/output data recorded.    Invasive Devices       None                   Physical Exam  Vitals reviewed.   Constitutional:       General: He is not in acute distress.     Appearance: Normal appearance. He is not ill-appearing, toxic-appearing or diaphoretic.   HENT:      Head: Normocephalic and atraumatic.      Nose: Nose normal.      Mouth/Throat:      Mouth: Mucous membranes are moist.   Eyes:      Extraocular Movements: Extraocular movements intact.   Cardiovascular:      Rate and Rhythm: Normal rate and regular rhythm.   Pulmonary:      Effort: Pulmonary effort is normal. No respiratory distress.      Breath sounds: Normal breath sounds. No wheezing, rhonchi or rales.   Abdominal:      General: Bowel sounds are normal. There is no distension.      Palpations: Abdomen is soft.      Tenderness: There is no  abdominal tenderness. There is no guarding or rebound.   Musculoskeletal:      Cervical back: Neck supple.   Skin:     General: Skin is dry.   Neurological:      Mental Status: He is alert and oriented to person, place, and time.   Psychiatric:         Mood and Affect: Mood normal.         Behavior: Behavior normal.         Thought Content: Thought content normal.         Judgment: Judgment normal.         Lab Results: I have personally reviewed pertinent reports.    Imaging: I have personally reviewed pertinent reports.   and I have personally reviewed pertinent films in PACS  EKG, Pathology, and Other Studies: I have personally reviewed pertinent reports.   and I have personally reviewed pertinent films in PACS  VTE Prophylaxis: Sequential compression device (Venodyne)  and Heparin    Code Status: No Order  Advance Directive and Living Will:      Power of :    POLST:      Counseling / Coordination of Care  Total floor / unit time spent today 30 minutes.  Greater than 50% of total time was spent with the patient and / or family counseling and / or coordination of care.  A description of the counseling / coordination of care:  .

## 2024-03-24 NOTE — DISCHARGE INSTRUCTIONS
Ureteral Stent Placement   WHAT YOU NEED TO KNOW:   Ureteral stent placement is a procedure to open a blocked or narrow ureter. The ureter is the tube that carries urine from your kidney into your bladder. A stent is a thin hollow plastic tube used to hold your ureter open and allow urine to flow. The stent may stay in for several weeks. Long-term stents will stay in longer and need to be replaced within a certain period of time.   DISCHARGE INSTRUCTIONS:   Seek care immediately if:   You urinate very little or not at all.    You have severe pain in your abdomen, even after you take medicine.    You have heavy bleeding from your urethra.     You see large blood clots in your urine, or your urine is bright red.    Contact your healthcare provider or urologist if:   You have a fever or chills.    You feel like you need to urinate very often.    Your symptoms get worse, or you develop new symptoms.    You have questions or concerns about your condition or care.    Medicines:  You may  need any of the following:  Acetaminophen  decreases pain and fever. It is available without a doctor's order. Ask how much to take and how often to take it. Follow directions. Read the labels of all other medicines you are using to see if they also contain acetaminophen, or ask your doctor or pharmacist. Acetaminophen can cause liver damage if not taken correctly.    Prescription pain medicine  may be given. Ask your healthcare provider how to take this medicine safely. Some prescription pain medicines contain acetaminophen. Do not take other medicines that contain acetaminophen without talking to your healthcare provider. Too much acetaminophen may cause liver damage. Prescription pain medicine may cause constipation. Ask your healthcare provider how to prevent or treat constipation.     Antibiotics  help prevent or treat bacterial infections. Your healthcare provider may prescribe these for you while you have a ureteral stent.     Take  your medicine as directed.  Contact your healthcare provider if you think your medicine is not helping or if you have side effects. Tell your provider if you are allergic to any medicine. Keep a list of the medicines, vitamins, and herbs you take. Include the amounts, and when and why you take them. Bring the list or the pill bottles to follow-up visits. Carry your medicine list with you in case of an emergency.    Self-care:   Drink liquids as directed.  Liquids will help flush your urinary tract and prevent infection. Ask your healthcare provider how much liquid to drink each day and which liquids are best for you.     Ask when you can return to daily activities.  You may be able to return to normal activities the day after your stent placement.    Follow up with your urologist as directed:  You will need regular follow-up visits with your urologist as long as you have a stent. He or she will check to make sure the stent is working properly. He or she will remove your temporary stent in several weeks. Your provider may do urine cultures to check for infection. Write down your questions so you remember to ask them during your visits.  © Copyright [employer] 2023 Information is for End User's use only and may not be sold, redistributed or otherwise used for commercial purposes.  The above information is an  only. It is not intended as medical advice for individual conditions or treatments. Talk to your doctor, nurse or pharmacist before following any medical regimen to see if it is safe and effective for you.

## 2024-03-25 ENCOUNTER — OFFICE VISIT (OUTPATIENT)
Dept: INTERNAL MEDICINE CLINIC | Age: 74
End: 2024-03-25
Payer: MEDICARE

## 2024-03-25 ENCOUNTER — TELEPHONE (OUTPATIENT)
Dept: INTERNAL MEDICINE CLINIC | Age: 74
End: 2024-03-25

## 2024-03-25 ENCOUNTER — TELEPHONE (OUTPATIENT)
Dept: ADMINISTRATIVE | Facility: OTHER | Age: 74
End: 2024-03-25

## 2024-03-25 ENCOUNTER — CONSULT (OUTPATIENT)
Dept: NEUROSURGERY | Facility: CLINIC | Age: 74
End: 2024-03-25
Payer: MEDICARE

## 2024-03-25 VITALS
HEIGHT: 68 IN | TEMPERATURE: 98.3 F | RESPIRATION RATE: 19 BRPM | BODY MASS INDEX: 23.43 KG/M2 | SYSTOLIC BLOOD PRESSURE: 110 MMHG | DIASTOLIC BLOOD PRESSURE: 78 MMHG | OXYGEN SATURATION: 99 % | WEIGHT: 154.6 LBS | HEART RATE: 64 BPM

## 2024-03-25 VITALS
DIASTOLIC BLOOD PRESSURE: 70 MMHG | SYSTOLIC BLOOD PRESSURE: 118 MMHG | TEMPERATURE: 98.6 F | OXYGEN SATURATION: 99 % | WEIGHT: 147 LBS | HEART RATE: 66 BPM | BODY MASS INDEX: 22.28 KG/M2 | HEIGHT: 68 IN

## 2024-03-25 DIAGNOSIS — S22.000A COMPRESSION FRACTURE OF BODY OF THORACIC VERTEBRA (HCC): Primary | ICD-10-CM

## 2024-03-25 DIAGNOSIS — Z01.812 PRE-OPERATIVE LABORATORY EXAMINATION: ICD-10-CM

## 2024-03-25 DIAGNOSIS — Z79.01 LONG TERM (CURRENT) USE OF ANTICOAGULANTS: ICD-10-CM

## 2024-03-25 DIAGNOSIS — M80.00XA AGE-RELATED OSTEOPOROSIS WITH CURRENT PATHOLOGICAL FRACTURE, INITIAL ENCOUNTER: Primary | ICD-10-CM

## 2024-03-25 DIAGNOSIS — M80.00XD AGE-RELATED OSTEOPOROSIS WITH CURRENT PATHOLOGICAL FRACTURE WITH ROUTINE HEALING, SUBSEQUENT ENCOUNTER: ICD-10-CM

## 2024-03-25 DIAGNOSIS — N20.0 NEPHROLITHIASIS: ICD-10-CM

## 2024-03-25 DIAGNOSIS — Z79.899 ENCOUNTER FOR LONG-TERM (CURRENT) USE OF OTHER MEDICATIONS: ICD-10-CM

## 2024-03-25 DIAGNOSIS — R41.89 COGNITIVE IMPAIRMENT: ICD-10-CM

## 2024-03-25 DIAGNOSIS — S32.050D COMPRESSION FRACTURE OF L5 VERTEBRA WITH ROUTINE HEALING, SUBSEQUENT ENCOUNTER: ICD-10-CM

## 2024-03-25 PROBLEM — R33.9 URINARY RETENTION: Status: RESOLVED | Noted: 2024-02-05 | Resolved: 2024-03-25

## 2024-03-25 PROCEDURE — 99205 OFFICE O/P NEW HI 60 MIN: CPT | Performed by: RADIOLOGY

## 2024-03-25 PROCEDURE — G0438 PPPS, INITIAL VISIT: HCPCS | Performed by: INTERNAL MEDICINE

## 2024-03-25 PROCEDURE — 99214 OFFICE O/P EST MOD 30 MIN: CPT | Performed by: INTERNAL MEDICINE

## 2024-03-25 NOTE — TELEPHONE ENCOUNTER
Upon review of the In Basket request we were able to locate, review, and update the patient chart as requested for Medicare AW.    Any additional questions or concerns should be emailed to the Practice Liaisons via the appropriate education email address, please do not reply via In Basket.    Thank you  Issac Viera MA

## 2024-03-25 NOTE — TELEPHONE ENCOUNTER
----- Message from Dorothy Mosqueda sent at 3/25/2024 11:07 AM EDT -----  03/25/24 11:07 AM    Hello, our patient Jose Raza has had Medicare AWV completed/performed. Please assist in updating the patient chart by making an External outreach to [facility] with Dr. Mcneil facility located in [place]. The date of service is 09/01/2022.    Thank you,  Dorothy Mosqueda  [facility]

## 2024-03-25 NOTE — PROGRESS NOTES
Assessment/Plan:     Diagnoses and all orders for this visit:    Age-related osteoporosis with current pathological fracture, initial encounter        Discussion Summary:   Jose has significant pain and difficulty with ambulation secondary to multiple compression fractures.  He has failed conservative therapy.  I recommended kyphoplasty of T12, L1 and L5.  He understands the risks and has elected proceed.  He requires medical clearance.  This procedure will also need to be  from his upcoming urological procedure by at least 2 weeks.           Chief Complaint: Consult      Patient ID: Jose Raza is a 73 y.o. male    HPI  Mr. Raza presents for evaluation of back pain in the setting of multiple compression fractures.  He is here with his wife who is providing most of the history.  She reports during a snowstorm in January he was outside shoveling snow.  Immediately afterwards he developed back pain in the lower back. 1-2 weeks later developed urinary retention and underwent CT that revealed multiple compression fractures. Referred to [facility] and placed in a brace 3 weeks ago. Has some mild relief with it on. In the past week, rates his pain at least an 8/10 at its worst. Has been unable to ambulate long distances.  Sleeps in a recliner at baseline. No radiating leg pain or numbness.     Review of Systems   Constitutional: Negative.    HENT: Negative.     Eyes: Negative.    Respiratory: Negative.     Cardiovascular: Negative.    Gastrointestinal: Negative.    Endocrine: Negative.    Genitourinary: Negative.    Musculoskeletal:  Positive for back pain (mid to lower back pain radiates to side of ribs at times) and gait problem (can only walk for short distances).   Neurological:  Negative for seizures, weakness and numbness.   Hematological:  Does not bruise/bleed easily (fish oil).   Psychiatric/Behavioral:  Positive for sleep disturbance (interrupted due to pain at times).        I have personally reviewed  the MA's review of systems and made changes as necessary.    The following portions of the patient's history were reviewed and updated as appropriate: allergies, current medications, past family history, past medical history, past social history, past surgical history, and problem list.      Active Ambulatory Problems     Diagnosis Date Noted    Cognitive impairment 08/23/2023    Chronic fatigue syndrome 08/23/2023    Nail abnormality 08/23/2023    Iron deficiency anemia 08/23/2023    Acute left-sided low back pain without sciatica 01/22/2024    Nephrolithiasis 02/05/2024    Other constipation 02/05/2024    Compression fracture of body of thoracic vertebra (HCC) 02/19/2024    Compression fracture of fifth lumbar vertebra (HCC) 02/19/2024    Age-related osteoporosis with current pathological fracture 02/23/2024     Resolved Ambulatory Problems     Diagnosis Date Noted    Urinary retention 02/05/2024     Past Medical History:   Diagnosis Date    Tsai esophagus 1998?    Marleni Marcelo virus infection 03/09/2024    GERD (gastroesophageal reflux disease)     Kidney stone     Memory loss     Osteoporosis        Past Surgical History:   Procedure Laterality Date    COLONOSCOPY      Within last 5 years    HERNIA REPAIR      KNEE SURGERY      UPPER GASTROINTESTINAL ENDOSCOPY         Current Outpatient Medications   Medication Sig Dispense Refill    cycloSPORINE (Restasis) 0.05 % ophthalmic emulsion Administer 1 drop to both eyes every 12 (twelve) hours      Fide 500 MG CAPS Take by mouth 500 mg daily      MAGNESIUM CARBONATE PO Take 1 tablet by mouth in the morning      Multiple Vitamin (Multi Vitamin Daily) TABS Take by mouth      NON FORMULARY Triple magnesium (glycinate 10%, oxide 60%, malate 30%,)      Omega-3 Fatty Acids (fish oil) 1,000 mg Take 2,000 mg by mouth daily      patient supplied medication Take 1 each by mouth daily One probiotic daily      Pyridoxine HCl (Vitamin B6) 100 MG TABS       risedronate  (ACTONEL) 35 mg tablet Take 1 tablet (35 mg total) by mouth every 7 days with water on empty stomach, nothing by mouth or lie down for next 30 minutes. 12 tablet 1    tamsulosin (FLOMAX) 0.4 mg Take 1 capsule (0.4 mg total) by mouth daily with dinner 90 capsule 3     No current facility-administered medications for this visit.       Vitals:    03/25/24 1356   BP: 110/78   Pulse: 64   Resp: 19   Temp: 98.3 °F (36.8 °C)   SpO2: 99%         Objective:    Physical Exam  Constitutional:       Appearance: Normal appearance.   HENT:      Head: Normocephalic and atraumatic.   Eyes:      Pupils: Pupils are equal, round, and reactive to light.   Pulmonary:      Effort: Pulmonary effort is normal.   Musculoskeletal:         General: Normal range of motion.   Skin:     General: Skin is warm and dry.   Neurological:      Mental Status: He is alert. He is disoriented.      Cranial Nerves: No cranial nerve deficit.      Sensory: No sensory deficit.      Motor: No weakness.      Gait: Gait normal.   Psychiatric:         Behavior: Behavior normal.       Neurologic Exam     Cranial Nerves     CN III, IV, VI   Pupils are equal, round, and reactive to light.      Results/Data:  I have reviewed the results and images from the MRI in detail with the patient.

## 2024-03-25 NOTE — PROGRESS NOTES
Assessment and Plan:     Problem List Items Addressed This Visit          Musculoskeletal and Integument    Compression fracture of body of thoracic vertebra (HCC) - Primary    Relevant Orders    Protein electrophoresis, serum    Protein, urine, random    Sedimentation rate, automated    C-reactive protein    Compression fracture of fifth lumbar vertebra (HCC)    Relevant Orders    Protein electrophoresis, serum    Protein, urine, random    Sedimentation rate, automated    C-reactive protein    Age-related osteoporosis with current pathological fracture    Relevant Orders    Vitamin D 25 hydroxy       Genitourinary    Nephrolithiasis       Neurology/Sleep    Cognitive impairment        Preventive health issues were discussed with patient, and age appropriate screening tests were ordered as noted in patient's After Visit Summary.  Personalized health advice and appropriate referrals for health education or preventive services given if needed, as noted in patient's After Visit Summary.     History of Present Illness:     Patient presents for a Medicare Wellness Visit    This is a 73 years young gentleman who was shoveling snow and couple of days later he complained of back pain subsequently did the workup which shows multiple compression fractures.  Seen by the endocrine, orthopedics, spine surgery, rheumatologist he is a going for possible vertebroplasty I reviewed his DEXA scan his DEXA scan are significantly poor and also his alkaline phosphatase is high I really do not know why at this stage he has such a bad patient is here for evaluation and treatment of osteoporosis patient's diet is regular with adequate calcium and vitamin D supplements. No history of thyrotoxicosis, use of steroids, patient does limited exercises, no family history of osteoporosis no recent fractures.she is postmenopausal. last DEXA scan was done     patient is high risk for fall his vitamin D levels were low.  He is testosterone levels  were normal I will check his sed rate C-reactive protein urine protein electrophoresis serum protein electrophoresis for further evaluation of his severe osteoporosis his PTH levels were normal calcium was normal and I think the Evenity will do better than any other medications at this point and certainly vertebroplasty or kyphoplasty will help him with his pain I discussed with him and his wife at bedside.    Cognitive impairment he is followed up by the neurology and he is supposed to see the neuropsychiatry but it is right now on hold because of his problems with his a spine    His alkaline phosphatase urology unknown    Hypertension is very well-controlled    His CBC is normal his total protein and albumin's are normal urine test shows proteinuria 2+ also at the same time he was diagnosed with the urinary calculi and he is followed up by the urology       Patient Care Team:  Miguel Cole MD as PCP - General (Internal Medicine)     Review of Systems:     Review of Systems   Constitutional:  Positive for fatigue. Negative for appetite change and fever.   HENT:  Negative for congestion, ear pain, hearing loss, nosebleeds, sneezing, tinnitus and voice change.    Eyes:  Negative for pain, discharge and redness.   Respiratory:  Negative for cough, chest tightness and wheezing.    Cardiovascular:  Negative for chest pain, palpitations and leg swelling.   Gastrointestinal:  Negative for abdominal pain, blood in stool, constipation, diarrhea, nausea and vomiting.   Genitourinary:  Negative for difficulty urinating, dysuria, hematuria and urgency.   Musculoskeletal:  Positive for back pain. Negative for arthralgias, gait problem and joint swelling.   Skin:  Negative for rash and wound.   Allergic/Immunologic: Negative for environmental allergies.   Neurological:  Negative for dizziness, tremors, seizures, weakness, light-headedness and numbness.   Hematological:  Negative for adenopathy. Does not bruise/bleed easily.    Psychiatric/Behavioral:  Negative for behavioral problems and confusion. The patient is not nervous/anxious.         Problem List:     Patient Active Problem List   Diagnosis    Cognitive impairment    Chronic fatigue syndrome    Nail abnormality    Iron deficiency anemia    Acute left-sided low back pain without sciatica    Nephrolithiasis    Other constipation    Compression fracture of body of thoracic vertebra (HCC)    Compression fracture of fifth lumbar vertebra (HCC)    Age-related osteoporosis with current pathological fracture      Past Medical and Surgical History:     Past Medical History:   Diagnosis Date    Tsai esophagus ?    Took famotidine for years    Compression fracture of body of thoracic vertebra (HCC) 2024    T12, L5, L1    Marleni Marcelo virus infection 2024    GERD (gastroesophageal reflux disease)     Kidney stone     Memory loss     Osteoporosis      Past Surgical History:   Procedure Laterality Date    COLONOSCOPY      Within last 5 years    HERNIA REPAIR      KNEE SURGERY      UPPER GASTROINTESTINAL ENDOSCOPY        Family History:     Family History   Problem Relation Age of Onset    Brain cancer Mother     Cancer Mother     Aneurysm Father       Social History:     Social History     Socioeconomic History    Marital status: /Civil Union     Spouse name: None    Number of children: None    Years of education: None    Highest education level: None   Occupational History    None   Tobacco Use    Smoking status: Former     Current packs/day: 0.00     Types: Cigarettes     Start date:      Quit date: 1975     Years since quittin.2    Smokeless tobacco: Never    Tobacco comments:     Causal smoker as a young man 51 years ago   Vaping Use    Vaping status: Never Used   Substance and Sexual Activity    Alcohol use: Not Currently    Drug use: Never    Sexual activity: Yes     Partners: Female   Other Topics Concern    None   Social History Narrative    None      Social Determinants of Health     Financial Resource Strain: Low Risk  (6/13/2023)    Received from [facility]    Overall Financial Resource Strain (CARDIA)     Difficulty of Paying Living Expenses: Not hard at all   Food Insecurity: No Food Insecurity (3/25/2024)    Hunger Vital Sign     Worried About Running Out of Food in the Last Year: Never true     Ran Out of Food in the Last Year: Never true   Transportation Needs: No Transportation Needs (3/25/2024)    PRAPARE - Transportation     Lack of Transportation (Medical): No     Lack of Transportation (Non-Medical): No   Physical Activity: Not on file   Stress: No Stress Concern Present (6/13/2023)    Received from [facility]    Citizen of [place] of Occupational Health - Occupational Stress Questionnaire     Feeling of Stress : Only a little   Social Connections: Socially Integrated (6/13/2023)    Received from [facility]    Social Connection and Isolation Panel [NHANES]     Frequency of Communication with Friends and Family: More than three times a week     Frequency of Social Gatherings with Friends and Family: More than three times a week     Attends [employer] Services: More than 4 times per year     Active Member of Clubs or Organizations: Yes     Attends Club or Organization Meetings: Never     Marital Status:    Intimate Partner Violence: Not At Risk (6/13/2023)    Received from [facility]    Humiliation, Afraid, Rape, and Kick questionnaire     Fear of Current or Ex-Partner: No     Emotionally Abused: No     Physically Abused: No     Sexually Abused: No   Housing Stability: Low Risk  (3/25/2024)    Housing Stability Vital Sign     Unable to Pay for Housing in the Last Year: No     Number of Places Lived in the Last Year: 1     Unstable Housing in the Last Year: No      Medications and Allergies:     Current Outpatient Medications   Medication Sig Dispense Refill    cycloSPORINE  (Restasis) 0.05 % ophthalmic emulsion Administer 1 drop to both eyes every 12 (twelve) hours      Fide 500 MG CAPS Take by mouth 500 mg daily      MAGNESIUM CARBONATE PO Take 1 tablet by mouth in the morning      Multiple Vitamin (Multi Vitamin Daily) TABS Take by mouth      NON FORMULARY Triple magnesium (glycinate 10%, oxide 60%, malate 30%,)      Omega-3 Fatty Acids (fish oil) 1,000 mg Take 2,000 mg by mouth daily      patient supplied medication Take 1 each by mouth daily One probiotic daily      Pyridoxine HCl (Vitamin B6) 100 MG TABS       risedronate (ACTONEL) 35 mg tablet Take 1 tablet (35 mg total) by mouth every 7 days with water on empty stomach, nothing by mouth or lie down for next 30 minutes. 12 tablet 1    tamsulosin (FLOMAX) 0.4 mg Take 1 capsule (0.4 mg total) by mouth daily with dinner 90 capsule 3     No current facility-administered medications for this visit.     No Known Allergies   Immunizations:     Immunization History   Administered Date(s) Administered    COVID-19 MODERNA VACC 0.5 ML IM 01/22/2021, 02/26/2021    INFLUENZA 11/12/2017, 11/01/2018, 11/08/2020, 11/07/2021    Pneumococcal Conjugate 13-Valent 02/26/2019    Pneumococcal Polysaccharide PPV23 11/15/2020    Tdap 04/04/2007, 08/22/2018    Tetanus Toxoid, Unspecified 08/22/2018    Zoster 11/15/2020    Zoster Vaccine Recombinant 11/13/2020      Health Maintenance:         Topic Date Due    Hepatitis C Screening  Never done    Colorectal Cancer Screening  04/30/2025         Topic Date Due    Influenza Vaccine (1) 09/01/2023    COVID-19 Vaccine (3 - 2023-24 season) 09/01/2023      Medicare Screening Tests and Risk Assessments:     Jose is here for his Subsequent Wellness visit. Last Medicare Wellness visit information reviewed, patient interviewed and updates made to the record today.      Health Risk Assessment:   Patient rates overall health as fair. Patient feels that their physical health rating is slightly worse. Patient is  satisfied with their life. Eyesight was rated as slightly worse. Hearing was rated as same. Patient feels that their emotional and mental health rating is same. Patients states they are never, rarely angry. Patient states they are always unusually tired/fatigued. Pain experienced in the last 7 days has been a lot. Patient's pain rating has been 5/10. Patient states that he has experienced no weight loss or gain in last 6 months.     Depression Screening:   PHQ-2 Score: 0      Fall Risk Screening:   In the past year, patient has experienced: no history of falling in past year      Home Safety:  Patient does not have trouble with stairs inside or outside of their home. Patient has working smoke alarms and has no working carbon monoxide detector. Home safety hazards include: none.     Nutrition:   Current diet is Other (please comment). No gluten, dairy, or sugar    Medications:   Patient is currently taking over-the-counter supplements. OTC medications include: see medication list. Patient is able to manage medications.     Activities of Daily Living (ADLs)/Instrumental Activities of Daily Living (IADLs):   Walk and transfer into and out of bed and chair?: Yes  Dress and groom yourself?: Yes    Bathe or shower yourself?: Yes    Feed yourself? Yes  Do your laundry/housekeeping?: Yes  Manage your money, pay your bills and track your expenses?: No  Make your own meals?: Yes    Do your own shopping?: Yes    ADL comments: My wife handles all bills    Previous Hospitalizations:   Any hospitalizations or ED visits within the last 12 months?: Yes    How many hospitalizations have you had in the last year?: 1-2    Hospitalization Comments: ER visits-2 visits    Advance Care Planning:   Living will: No    Durable POA for healthcare: Yes    Advanced directive: No    Advanced directive counseling given: Yes      Cognitive Screening:   Provider or family/friend/caregiver concerned regarding cognition?: No    PREVENTIVE  "SCREENINGS      Cardiovascular Screening:    General: Screening Current      Diabetes Screening:     General: Screening Current      Colorectal Cancer Screening:     General: Screening Current      Prostate Cancer Screening:    General: Screening Current      Osteoporosis Screening:    General: Screening Not Indicated and History Osteoporosis      Abdominal Aortic Aneurysm (AAA) Screening:    Risk factors include: age between 65-74 yo and tobacco use        General: Screening Current      Lung Cancer Screening:     General: Screening Not Indicated      Hepatitis C Screening:    General: Screening Not Indicated    Screening, Brief Intervention, and Referral to Treatment (SBIRT)    Screening  Typical number of drinks in a day: 0  Typical number of drinks in a week: 0  Interpretation: Low risk drinking behavior.    AUDIT-C Screenin) How often did you have a drink containing alcohol in the past year? never  2) How many drinks did you have on a typical day when you were drinking in the past year? 0  3) How often did you have 6 or more drinks on one occasion in the past year? never    AUDIT-C Score: 0  Interpretation: Score 0-3 (male): Negative screen for alcohol misuse    Single Item Drug Screening:  How often have you used an illegal drug (including marijuana) or a prescription medication for non-medical reasons in the past year? never    Single Item Drug Screen Score: 0  Interpretation: Negative screen for possible drug use disorder    Other Counseling Topics:   Calcium and vitamin D intake and regular weightbearing exercise.     No results found.     Physical Exam:     /70 (BP Location: Left arm, Patient Position: Sitting, Cuff Size: Standard)   Pulse 66   Temp 98.6 °F (37 °C) (Temporal)   Ht 5' 8\" (1.727 m)   Wt 66.7 kg (147 lb)   SpO2 99%   BMI 22.35 kg/m²     Physical Exam  Vitals and nursing note reviewed.   Constitutional:       General: He is not in acute distress.     Appearance: He is " well-developed.   HENT:      Head: Normocephalic and atraumatic.   Eyes:      Conjunctiva/sclera: Conjunctivae normal.   Cardiovascular:      Rate and Rhythm: Normal rate and regular rhythm.      Heart sounds: No murmur heard.  Pulmonary:      Effort: Pulmonary effort is normal. No respiratory distress.      Breath sounds: Normal breath sounds.   Abdominal:      Palpations: Abdomen is soft.      Tenderness: There is no abdominal tenderness.   Musculoskeletal:         General: No swelling.      Cervical back: Neck supple.   Skin:     General: Skin is warm and dry.      Capillary Refill: Capillary refill takes less than 2 seconds.   Neurological:      Mental Status: He is alert.   Psychiatric:         Mood and Affect: Mood normal.          Miguel Cole MD

## 2024-03-25 NOTE — PATIENT INSTRUCTIONS
Medicare Preventive Visit Patient Instructions  Thank you for completing your Welcome to Medicare Visit or Medicare Annual Wellness Visit today. Your next wellness visit will be due in one year (3/26/2025).  The screening/preventive services that you may require over the next 5-10 years are detailed below. Some tests may not apply to you based off risk factors and/or age. Screening tests ordered at today's visit but not completed yet may show as past due. Also, please note that scanned in results may not display below.  Preventive Screenings:  Service Recommendations Previous Testing/Comments   Colorectal Cancer Screening  Colonoscopy    Fecal Occult Blood Test (FOBT)/Fecal Immunochemical Test (FIT)  Fecal DNA/Cologuard Test  Flexible Sigmoidoscopy Age: 45-75 years old   Colonoscopy: every 10 years (May be performed more frequently if at higher risk)  OR  FOBT/FIT: every 1 year  OR  Cologuard: every 3 years  OR  Sigmoidoscopy: every 5 years  Screening may be recommended earlier than age 45 if at higher risk for colorectal cancer. Also, an individualized decision between you and your healthcare provider will decide whether screening between the ages of 76-85 would be appropriate. Colonoscopy: 04/30/2015  FOBT/FIT: Not on file  Cologuard: Not on file  Sigmoidoscopy: Not on file    Screening Current     Prostate Cancer Screening Individualized decision between patient and health care provider in men between ages of 55-69   Medicare will cover every 12 months beginning on the day after your 50th birthday PSA: 0.48 ng/mL     Screening Current     Hepatitis C Screening Once for adults born between 1945 and 1965  More frequently in patients at high risk for Hepatitis C Hep C Antibody: Not on file        Diabetes Screening 1-2 times per year if you're at risk for diabetes or have pre-diabetes Fasting glucose: 82 mg/dL (3/18/2024)  A1C: 5.5 % (7/26/2022)  Screening Current   Cholesterol Screening Once every 5 years if you  don't have a lipid disorder. May order more often based on risk factors. Lipid panel: 08/01/2023  Screening Current      Other Preventive Screenings Covered by Medicare:  Abdominal Aortic Aneurysm (AAA) Screening: covered once if your at risk. You're considered to be at risk if you have a family history of AAA or a male between the age of 65-75 who smoking at least 100 cigarettes in your lifetime.  Lung Cancer Screening: covers low dose CT scan once per year if you meet all of the following conditions: (1) Age 55-77; (2) No signs or symptoms of lung cancer; (3) Current smoker or have quit smoking within the last 15 years; (4) You have a tobacco smoking history of at least 20 pack years (packs per day x number of years you smoked); (5) You get a written order from a healthcare provider.  Glaucoma Screening: covered annually if you're considered high risk: (1) You have diabetes OR (2) Family history of glaucoma OR (3)  aged 50 and older OR (4)  American aged 65 and older  Osteoporosis Screening: covered every 2 years if you meet one of the following conditions: (1) Have a vertebral abnormality; (2) On glucocorticoid therapy for more than 3 months; (3) Have primary hyperparathyroidism; (4) On osteoporosis medications and need to assess response to drug therapy.  HIV Screening: covered annually if you're between the age of 15-65. Also covered annually if you are younger than 15 and older than 65 with risk factors for HIV infection. For pregnant patients, it is covered up to 3 times per pregnancy.    Immunizations:  Immunization Recommendations   Influenza Vaccine Annual influenza vaccination during flu season is recommended for all persons aged >= 6 months who do not have contraindications   Pneumococcal Vaccine   * Pneumococcal conjugate vaccine = PCV13 (Prevnar 13), PCV15 (Vaxneuvance), PCV20 (Prevnar 20)  * Pneumococcal polysaccharide vaccine = PPSV23 (Pneumovax) Adults 19-63 yo with certain  risk factors or if 65+ yo  If never received any pneumonia vaccine: recommend Prevnar 20 (PCV20)  Give PCV20 if previously received 1 dose of PCV13 or PPSV23   Hepatitis B Vaccine 3 dose series if at intermediate or high risk (ex: diabetes, end stage renal disease, liver disease)   Respiratory syncytial virus (RSV) Vaccine - COVERED BY MEDICARE PART D  * RSVPreF3 (Arexvy) CDC recommends that adults 60 years of age and older may receive a single dose of RSV vaccine using shared clinical decision-making (SCDM)   Tetanus (Td) Vaccine - COST NOT COVERED BY MEDICARE PART B Following completion of primary series, a booster dose should be given every 10 years to maintain immunity against tetanus. Td may also be given as tetanus wound prophylaxis.   Tdap Vaccine - COST NOT COVERED BY MEDICARE PART B Recommended at least once for all adults. For pregnant patients, recommended with each pregnancy.   Shingles Vaccine (Shingrix) - COST NOT COVERED BY MEDICARE PART B  2 shot series recommended in those 19 years and older who have or will have weakened immune systems or those 50 years and older     Health Maintenance Due:      Topic Date Due   • Hepatitis C Screening  Never done   • Colorectal Cancer Screening  04/30/2025     Immunizations Due:      Topic Date Due   • Influenza Vaccine (1) 09/01/2023   • COVID-19 Vaccine (3 - 2023-24 season) 09/01/2023     Advance Directives   What are advance directives?  Advance directives are legal documents that state your wishes and plans for medical care. These plans are made ahead of time in case you lose your ability to make decisions for yourself. Advance directives can apply to any medical decision, such as the treatments you want, and if you want to donate organs.   What are the types of advance directives?  There are many types of advance directives, and each state has rules about how to use them. You may choose a combination of any of the following:  Living will:  This is a written  record of the treatment you want. You can also choose which treatments you do not want, which to limit, and which to stop at a certain time. This includes surgery, medicine, IV fluid, and tube feedings.   Durable power of  for healthcare (DPAHC):  This is a written record that states who you want to make healthcare choices for you when you are unable to make them for yourself. This person, called a proxy, is usually a family member or a friend. You may choose more than 1 proxy.  Do not resuscitate (DNR) order:  A DNR order is used in case your heart stops beating or you stop breathing. It is a request not to have certain forms of treatment, such as CPR. A DNR order may be included in other types of advance directives.  Medical directive:  This covers the care that you want if you are in a coma, near death, or unable to make decisions for yourself. You can list the treatments you want for each condition. Treatment may include pain medicine, surgery, blood transfusions, dialysis, IV or tube feedings, and a ventilator (breathing machine).  Values history:  This document has questions about your views, beliefs, and how you feel and think about life. This information can help others choose the care that you would choose.  Why are advance directives important?  An advance directive helps you control your care. Although spoken wishes may be used, it is better to have your wishes written down. Spoken wishes can be misunderstood, or not followed. Treatments may be given even if you do not want them. An advance directive may make it easier for your family to make difficult choices about your care.       © Copyright [employer] 2018 Information is for End User's use only and may not be sold, redistributed or otherwise used for commercial purposes. All illustrations and images included in CareNotes® are the copyrighted property of [employer] or [employer]

## 2024-03-25 NOTE — TELEPHONE ENCOUNTER
Patient was seen in office today and Dr Cole wanted to see if the patient can get Evenity injections

## 2024-03-26 NOTE — TELEPHONE ENCOUNTER
Dr. Cole,      Evenity hasn't been approved for men.  I can't get this going for this patient.  He would need to try oral medication or I can try the Prolia for this patient.  Please advise

## 2024-03-27 ENCOUNTER — APPOINTMENT (OUTPATIENT)
Dept: LAB | Age: 74
End: 2024-03-27
Payer: MEDICARE

## 2024-03-27 DIAGNOSIS — F02.80 ALZHEIMER'S DISEASE (HCC): ICD-10-CM

## 2024-03-27 DIAGNOSIS — S22.000A COMPRESSION FRACTURE OF BODY OF THORACIC VERTEBRA (HCC): ICD-10-CM

## 2024-03-27 DIAGNOSIS — G30.9 ALZHEIMER'S DISEASE (HCC): ICD-10-CM

## 2024-03-27 DIAGNOSIS — G31.01 PICK'S DISEASE (HCC): ICD-10-CM

## 2024-03-27 DIAGNOSIS — G31.84 MCI (MILD COGNITIVE IMPAIRMENT) WITH MEMORY LOSS: ICD-10-CM

## 2024-03-27 DIAGNOSIS — M80.00XD AGE-RELATED OSTEOPOROSIS WITH CURRENT PATHOLOGICAL FRACTURE WITH ROUTINE HEALING, SUBSEQUENT ENCOUNTER: ICD-10-CM

## 2024-03-27 DIAGNOSIS — F02.80 PICK'S DISEASE (HCC): ICD-10-CM

## 2024-03-27 DIAGNOSIS — S32.050D COMPRESSION FRACTURE OF L5 VERTEBRA WITH ROUTINE HEALING, SUBSEQUENT ENCOUNTER: ICD-10-CM

## 2024-03-27 LAB
25(OH)D3 SERPL-MCNC: 42.3 NG/ML (ref 30–100)
CRP SERPL QL: <1 MG/L
ERYTHROCYTE [SEDIMENTATION RATE] IN BLOOD: 7 MM/HOUR (ref 0–19)
PROT UR-MCNC: 24 MG/DL
VIT B12 SERPL-MCNC: 716 PG/ML (ref 180–914)

## 2024-03-27 PROCEDURE — 84156 ASSAY OF PROTEIN URINE: CPT

## 2024-03-27 PROCEDURE — 86140 C-REACTIVE PROTEIN: CPT

## 2024-03-27 PROCEDURE — 84165 PROTEIN E-PHORESIS SERUM: CPT

## 2024-03-27 PROCEDURE — 82607 VITAMIN B-12: CPT

## 2024-03-27 PROCEDURE — 85652 RBC SED RATE AUTOMATED: CPT

## 2024-03-27 PROCEDURE — 82306 VITAMIN D 25 HYDROXY: CPT

## 2024-03-27 PROCEDURE — 36415 COLL VENOUS BLD VENIPUNCTURE: CPT

## 2024-03-27 RX ORDER — SODIUM CHLORIDE 9 MG/ML
75 INJECTION, SOLUTION INTRAVENOUS CONTINUOUS
OUTPATIENT
Start: 2024-03-27

## 2024-03-27 RX ORDER — CEFAZOLIN SODIUM 1 G/50ML
1000 SOLUTION INTRAVENOUS ONCE
OUTPATIENT
Start: 2024-03-27 | End: 2024-03-27

## 2024-03-28 ENCOUNTER — ANESTHESIA EVENT (OUTPATIENT)
Dept: PERIOP | Facility: HOSPITAL | Age: 74
End: 2024-03-28
Payer: MEDICARE

## 2024-03-28 LAB
ALBUMIN SERPL ELPH-MCNC: 4.06 G/DL (ref 3.2–5.1)
ALBUMIN SERPL ELPH-MCNC: 64.5 % (ref 48–70)
ALPHA1 GLOB SERPL ELPH-MCNC: 0.21 G/DL (ref 0.15–0.47)
ALPHA1 GLOB SERPL ELPH-MCNC: 3.4 % (ref 1.8–7)
ALPHA2 GLOB SERPL ELPH-MCNC: 0.66 G/DL (ref 0.42–1.04)
ALPHA2 GLOB SERPL ELPH-MCNC: 10.5 % (ref 5.9–14.9)
BETA GLOB ABNORMAL SERPL ELPH-MCNC: 0.37 G/DL (ref 0.31–0.57)
BETA1 GLOB SERPL ELPH-MCNC: 5.9 % (ref 4.7–7.7)
BETA2 GLOB SERPL ELPH-MCNC: 5 % (ref 3.1–7.9)
BETA2+GAMMA GLOB SERPL ELPH-MCNC: 0.32 G/DL (ref 0.2–0.58)
GAMMA GLOB ABNORMAL SERPL ELPH-MCNC: 0.67 G/DL (ref 0.4–1.66)
GAMMA GLOB SERPL ELPH-MCNC: 10.7 % (ref 6.9–22.3)
IGG/ALB SER: 1.82 {RATIO} (ref 1.1–1.8)
PROT PATTERN SERPL ELPH-IMP: ABNORMAL
PROT SERPL-MCNC: 6.3 G/DL (ref 6.4–8.2)

## 2024-03-28 PROCEDURE — 84165 PROTEIN E-PHORESIS SERUM: CPT | Performed by: PATHOLOGY

## 2024-03-29 ENCOUNTER — NURSE TRIAGE (OUTPATIENT)
Age: 74
End: 2024-03-29

## 2024-03-29 ENCOUNTER — HOSPITAL ENCOUNTER (OUTPATIENT)
Facility: HOSPITAL | Age: 74
Setting detail: OUTPATIENT SURGERY
Discharge: HOME | End: 2024-03-29
Attending: UROLOGY | Admitting: UROLOGY
Payer: MEDICARE

## 2024-03-29 ENCOUNTER — ANESTHESIA (OUTPATIENT)
Dept: PERIOP | Facility: HOSPITAL | Age: 74
End: 2024-03-29
Payer: MEDICARE

## 2024-03-29 ENCOUNTER — APPOINTMENT (OUTPATIENT)
Dept: RADIOLOGY | Facility: HOSPITAL | Age: 74
End: 2024-03-29
Payer: MEDICARE

## 2024-03-29 VITALS
TEMPERATURE: 98.5 F | HEART RATE: 70 BPM | OXYGEN SATURATION: 98 % | RESPIRATION RATE: 18 BRPM | DIASTOLIC BLOOD PRESSURE: 76 MMHG | BODY MASS INDEX: 23.26 KG/M2 | SYSTOLIC BLOOD PRESSURE: 120 MMHG | HEIGHT: 68 IN | WEIGHT: 153.44 LBS

## 2024-03-29 DIAGNOSIS — N20.0 NEPHROLITHIASIS: Primary | ICD-10-CM

## 2024-03-29 PROBLEM — R41.3 MEMORY LOSS: Status: ACTIVE | Noted: 2024-03-29

## 2024-03-29 PROBLEM — K21.9 GERD (GASTROESOPHAGEAL REFLUX DISEASE): Status: ACTIVE | Noted: 2024-03-29

## 2024-03-29 PROBLEM — K22.70 BARRETT ESOPHAGUS: Status: ACTIVE | Noted: 2024-03-29

## 2024-03-29 PROCEDURE — C1889 IMPLANT/INSERT DEVICE, NOC: HCPCS | Performed by: UROLOGY

## 2024-03-29 PROCEDURE — C1769 GUIDE WIRE: HCPCS | Performed by: UROLOGY

## 2024-03-29 PROCEDURE — 52356 CYSTO/URETERO W/LITHOTRIPSY: CPT | Performed by: UROLOGY

## 2024-03-29 PROCEDURE — C1747 URETEROSCOPE DIGITAL FLEX SNGL USE RVS DEFLECTION APTRA: HCPCS | Performed by: UROLOGY

## 2024-03-29 PROCEDURE — 74420 UROGRAPHY RTRGR +-KUB: CPT

## 2024-03-29 PROCEDURE — C1894 INTRO/SHEATH, NON-LASER: HCPCS | Performed by: UROLOGY

## 2024-03-29 PROCEDURE — NC001 PR NO CHARGE: Performed by: UROLOGY

## 2024-03-29 PROCEDURE — C2617 STENT, NON-COR, TEM W/O DEL: HCPCS | Performed by: UROLOGY

## 2024-03-29 DEVICE — VARIABLE LENGTH INJECTION STENT SET
Type: IMPLANTABLE DEVICE | Site: URETER | Status: FUNCTIONAL
Brand: CONTOUR VL™ INJECTION STENT SET

## 2024-03-29 RX ORDER — OXYCODONE HYDROCHLORIDE AND ACETAMINOPHEN 5; 325 MG/1; MG/1
1 TABLET ORAL EVERY 8 HOURS PRN
Qty: 12 TABLET | Refills: 0 | Status: SHIPPED | OUTPATIENT
Start: 2024-03-29 | End: 2024-04-08

## 2024-03-29 RX ORDER — PROPOFOL 10 MG/ML
INJECTION, EMULSION INTRAVENOUS AS NEEDED
Status: DISCONTINUED | OUTPATIENT
Start: 2024-03-29 | End: 2024-03-29

## 2024-03-29 RX ORDER — FUROSEMIDE 10 MG/ML
INJECTION INTRAMUSCULAR; INTRAVENOUS AS NEEDED
Status: DISCONTINUED | OUTPATIENT
Start: 2024-03-29 | End: 2024-03-29

## 2024-03-29 RX ORDER — SODIUM CHLORIDE 9 MG/ML
125 INJECTION, SOLUTION INTRAVENOUS CONTINUOUS
Status: DISCONTINUED | OUTPATIENT
Start: 2024-03-29 | End: 2024-03-29

## 2024-03-29 RX ORDER — HEPARIN SODIUM 5000 [USP'U]/ML
5000 INJECTION, SOLUTION INTRAVENOUS; SUBCUTANEOUS ONCE
Status: COMPLETED | OUTPATIENT
Start: 2024-03-29 | End: 2024-03-29

## 2024-03-29 RX ORDER — ACETAMINOPHEN 325 MG/1
650 TABLET ORAL EVERY 6 HOURS PRN
Status: DISCONTINUED | OUTPATIENT
Start: 2024-03-29 | End: 2024-03-29 | Stop reason: HOSPADM

## 2024-03-29 RX ORDER — ONDANSETRON 2 MG/ML
4 INJECTION INTRAMUSCULAR; INTRAVENOUS ONCE AS NEEDED
Status: DISCONTINUED | OUTPATIENT
Start: 2024-03-29 | End: 2024-03-29 | Stop reason: HOSPADM

## 2024-03-29 RX ORDER — ONDANSETRON 2 MG/ML
INJECTION INTRAMUSCULAR; INTRAVENOUS AS NEEDED
Status: DISCONTINUED | OUTPATIENT
Start: 2024-03-29 | End: 2024-03-29

## 2024-03-29 RX ORDER — POLYETHYLENE GLYCOL 3350 17 G/17G
17 POWDER, FOR SOLUTION ORAL DAILY
COMMUNITY

## 2024-03-29 RX ORDER — FENTANYL CITRATE 50 UG/ML
INJECTION, SOLUTION INTRAMUSCULAR; INTRAVENOUS AS NEEDED
Status: DISCONTINUED | OUTPATIENT
Start: 2024-03-29 | End: 2024-03-29

## 2024-03-29 RX ORDER — ACETAMINOPHEN 500 MG
500 TABLET ORAL EVERY 6 HOURS PRN
COMMUNITY

## 2024-03-29 RX ORDER — CEFAZOLIN SODIUM 1 G/50ML
1000 SOLUTION INTRAVENOUS ONCE
Status: COMPLETED | OUTPATIENT
Start: 2024-03-29 | End: 2024-03-29

## 2024-03-29 RX ORDER — SULFAMETHOXAZOLE AND TRIMETHOPRIM 800; 160 MG/1; MG/1
1 TABLET ORAL DAILY
Qty: 14 TABLET | Refills: 0 | Status: SHIPPED | OUTPATIENT
Start: 2024-03-29 | End: 2024-04-12

## 2024-03-29 RX ORDER — LIDOCAINE HYDROCHLORIDE 20 MG/ML
INJECTION, SOLUTION EPIDURAL; INFILTRATION; INTRACAUDAL; PERINEURAL AS NEEDED
Status: DISCONTINUED | OUTPATIENT
Start: 2024-03-29 | End: 2024-03-29

## 2024-03-29 RX ORDER — ROCURONIUM BROMIDE 10 MG/ML
INJECTION, SOLUTION INTRAVENOUS AS NEEDED
Status: DISCONTINUED | OUTPATIENT
Start: 2024-03-29 | End: 2024-03-29

## 2024-03-29 RX ORDER — SODIUM CHLORIDE, SODIUM LACTATE, POTASSIUM CHLORIDE, CALCIUM CHLORIDE 600; 310; 30; 20 MG/100ML; MG/100ML; MG/100ML; MG/100ML
INJECTION, SOLUTION INTRAVENOUS CONTINUOUS PRN
Status: DISCONTINUED | OUTPATIENT
Start: 2024-03-29 | End: 2024-03-29

## 2024-03-29 RX ORDER — FENTANYL CITRATE/PF 50 MCG/ML
25 SYRINGE (ML) INJECTION
Status: DISCONTINUED | OUTPATIENT
Start: 2024-03-29 | End: 2024-03-29 | Stop reason: HOSPADM

## 2024-03-29 RX ORDER — EPHEDRINE SULFATE 50 MG/ML
INJECTION INTRAVENOUS AS NEEDED
Status: DISCONTINUED | OUTPATIENT
Start: 2024-03-29 | End: 2024-03-29

## 2024-03-29 RX ORDER — OXYCODONE HYDROCHLORIDE AND ACETAMINOPHEN 5; 325 MG/1; MG/1
1 TABLET ORAL EVERY 4 HOURS PRN
Status: DISCONTINUED | OUTPATIENT
Start: 2024-03-29 | End: 2024-03-29 | Stop reason: HOSPADM

## 2024-03-29 RX ORDER — DEXAMETHASONE SODIUM PHOSPHATE 10 MG/ML
INJECTION, SOLUTION INTRAMUSCULAR; INTRAVENOUS AS NEEDED
Status: DISCONTINUED | OUTPATIENT
Start: 2024-03-29 | End: 2024-03-29

## 2024-03-29 RX ADMIN — PROPOFOL 150 MG: 10 INJECTION, EMULSION INTRAVENOUS at 07:43

## 2024-03-29 RX ADMIN — SODIUM CHLORIDE 125 ML/HR: 0.9 INJECTION, SOLUTION INTRAVENOUS at 06:31

## 2024-03-29 RX ADMIN — FUROSEMIDE 10 MG: 10 INJECTION, SOLUTION INTRAVENOUS at 09:19

## 2024-03-29 RX ADMIN — FENTANYL CITRATE 50 MCG: 50 INJECTION INTRAMUSCULAR; INTRAVENOUS at 07:43

## 2024-03-29 RX ADMIN — EPHEDRINE SULFATE 10 MG: 50 INJECTION, SOLUTION INTRAVENOUS at 08:35

## 2024-03-29 RX ADMIN — HEPARIN SODIUM 5000 UNITS: 5000 INJECTION INTRAVENOUS; SUBCUTANEOUS at 06:32

## 2024-03-29 RX ADMIN — ROCURONIUM BROMIDE 40 MG: 10 INJECTION, SOLUTION INTRAVENOUS at 07:43

## 2024-03-29 RX ADMIN — CEFAZOLIN SODIUM 1000 MG: 1 SOLUTION INTRAVENOUS at 07:34

## 2024-03-29 RX ADMIN — SODIUM CHLORIDE, SODIUM LACTATE, POTASSIUM CHLORIDE, AND CALCIUM CHLORIDE: .6; .31; .03; .02 INJECTION, SOLUTION INTRAVENOUS at 10:20

## 2024-03-29 RX ADMIN — FENTANYL CITRATE 50 MCG: 50 INJECTION INTRAMUSCULAR; INTRAVENOUS at 09:01

## 2024-03-29 RX ADMIN — LIDOCAINE HYDROCHLORIDE 100 MG: 20 INJECTION, SOLUTION EPIDURAL; INFILTRATION; INTRACAUDAL at 07:43

## 2024-03-29 RX ADMIN — ONDANSETRON 4 MG: 2 INJECTION INTRAMUSCULAR; INTRAVENOUS at 09:01

## 2024-03-29 RX ADMIN — EPHEDRINE SULFATE 10 MG: 50 INJECTION, SOLUTION INTRAVENOUS at 08:07

## 2024-03-29 RX ADMIN — ACETAMINOPHEN 325MG 650 MG: 325 TABLET ORAL at 12:23

## 2024-03-29 RX ADMIN — EPHEDRINE SULFATE 10 MG: 50 INJECTION, SOLUTION INTRAVENOUS at 07:50

## 2024-03-29 RX ADMIN — GENTAMICIN SULFATE 136 MG: 40 INJECTION, SOLUTION INTRAMUSCULAR; INTRAVENOUS at 07:46

## 2024-03-29 RX ADMIN — EPHEDRINE SULFATE 10 MG: 50 INJECTION, SOLUTION INTRAVENOUS at 07:53

## 2024-03-29 RX ADMIN — DEXAMETHASONE SODIUM PHOSPHATE 10 MG: 10 INJECTION INTRAMUSCULAR; INTRAVENOUS at 07:43

## 2024-03-29 RX ADMIN — SODIUM CHLORIDE, SODIUM LACTATE, POTASSIUM CHLORIDE, AND CALCIUM CHLORIDE: .6; .31; .03; .02 INJECTION, SOLUTION INTRAVENOUS at 08:15

## 2024-03-29 RX ADMIN — ROCURONIUM BROMIDE 10 MG: 10 INJECTION, SOLUTION INTRAVENOUS at 08:32

## 2024-03-29 RX ADMIN — OXYCODONE HYDROCHLORIDE AND ACETAMINOPHEN 1 TABLET: 5; 325 TABLET ORAL at 13:06

## 2024-03-29 RX ADMIN — SUGAMMADEX 200 MG: 100 INJECTION, SOLUTION INTRAVENOUS at 10:12

## 2024-03-29 NOTE — TELEPHONE ENCOUNTER
Additional Information   Negative: The patient is unable to urinate and offices are closing/closed/after hours   Negative: The patient has persistent vomiting   Negative: The patient has a fever of 101 or higher   Negative: The patient has severe uncontrolled pain    Answer Questions - Initial Assessment   When did this start: Today- gross hematuria since the surgery. When they gopt home a nickel sized clot was passed, currently able to urinate without issues and aware to increase water intake.     Do you have a fever: No    Do you have pain: Yes: Where is the pain located: right side of the lower back     Pain level: 4    Do you have any blood clots: yes-one thea sized clot, urine is cranberry red in color, as it was in the hospital too.     Have you become unable to urinate: no    Have you had a bowel movement recently or since surgery: no, taking Miralax daily and colace daily to prevent constipation.   Reviewed stent instructions. Reviewed medications to take as prescribed.  Reviewed bladder irritants to avoid, and to stay hydrated with at least 64 oz. Of water/day as long as no fluid restrictions, avoidance of constipation. ED precautions reviewed as well.    Protocols used: Urology Post-Op Problem  Please call to schedule 2 week follow up, currently no availability.   Call back#: [phone]

## 2024-03-29 NOTE — OP NOTE
OPERATIVE REPORT  PATIENT NAME: Jose Raza    :  1950  MRN: [id number]  Pt Location: [facility] OR ROOM 06    SURGERY DATE: 3/29/2024    Surgeons and Role:     * Salvatore Celis MD - Primary    Preop Diagnosis:  Nephrolithiasis N20.0-right renal stones    Post-Op Diagnosis Codes:     * Nephrolithiasis [N20.0]-right renal stones    Procedure(s):  Right - CYSTO USCOPE W/  LASER. &  STENT-cystoscopy right retrograde pyelogram right ureteroscopic holmium laser lithotripsy and CVAC with right ureteral stent insertion    Specimen(s):  * No specimens in log *    Estimated Blood Loss:   Minimal    Drains:  6 Egyptian right ureteral stent draining renal pelvis right kidney into the urinary bladder with transurethral Dangler suture taped to the phallus    Anesthesia Type:   General intubational    Operative Indications:  Nephrolithiasis N20.0  -Right renal stones-2.2 x 1.3 cm right upper pole calyceal stone with smaller lower calyceal stones.  Patient did not wish to undergo percutaneous stone treatment and opted for ureteroscopic laser lithotripsy.    Operative Findings:  See operative note below    Complications:   None    Procedure and Technique:  I met the patient in the holding area with his wife and discussed the proposed procedure in detail demonstrating the stone on PACS for both the patient and his wife.  We discussed percutaneous approaches to the stone which I actually feel is more appropriate however the patient wished a ureteroscopic approach.  We discussed risks and complications of bleeding infection retained stone or stone fragments need for additional procedures need for stent possible urinary retention possible need of a Dobbs catheter.  The patient expressed understanding also understanding risks of perforation contracture stricture.  The patient had previously signed informed consent and reaffirmed this verbally.  The patient was taken to the operating room identified by the surgeon prepped and draped  in usual sterile fashion in the dorsolithotomy position.  After appropriate timeout and intubation a 22 Nicarag[place] cystoscope with a 30 degree lens was used to intubate the urethra into the bladder.  The anterior urethra was within normal limits without stricture or lesion.  There was a small stricture that was not limiting to the lumen of the urethra located in the deep bulb.  The prostatic urethra revealed bilobar enlargement of the lateral lobes of the prostate without visual occlusion of the bladder outlet.  There is no median lobe enlargement or mucosal abnormality of the prostatic urethra.  Examination of the urinary bladder using the 30 and 70 degree telescope's took place revealing mild trabeculation without intrinsic lesion extrinsic mass compression.  Ureteral orifices bilaterally were within normal position and configuration with clear reflux bilaterally.  A 0.035 Josue-coated floppy tip guidewire was inserted retrograde up the right ureter into the renal pelvis under fluoroscopic control.  The cystoscope was removed and then reinserted so that a second 0.035 Josue-coated floppy tip guidewire could be inserted.  At this point the cystoscope was removed and 1 wire was used as a safety wire and the other is a working wire.  Over the working wire a 12/14 Nicaraguan 35 cm ureteral access sheath was placed first with the obturator and then with the obturator and sheath up to the proximal ureter.  The obturator and wire were then removed.  The disposable ureteroscope was then placed up the access sheath and proximal ureter into the renal pelvis and upper pole calyx where a 2.2 x 1.3 cm calculus howard color was encountered.  The 365 µm holmium laser fiber was used to fragment this entire stone into small submillimeter particles.  At this point after 2 hours of fragmenting the upper pole stone which was extremely large attention was briefly turned to the lower pole where stones were encountered as well and fragmented  using the holmium laser as well.  At this point the ureteroscope was removed leaving a working wire in the ureteral sheath.  Over that wire the CVAC was placed into the renal pelvis the wire removed and stone fragments suctioned from the system.  Cystoscopy did not suction all stone fragments.  Contrast injection confirmed integrity of the upper urinary tract without any evidence of perforation or extravasation.  After this EVAC was used to suction as much particulate matter from the upper and lower poles of the kidney this was removed and the ureteroscope replaced through the sheath with the sheath and ureteroscope removed under direct vision leaving a working wire in the renal pelvis exiting the urethral meatus.  After the ureteroscope and sheath were removed the only thing left and the patient was the wire which was backloaded into the 22 Citizen of [place] cystoscope which was placed per urethra and the urinary bladder.  A 6 Citizen of [place] stent was placed over the wire and ultimately internalized with 3 curls in the renal pelvis and 1 in the urinary bladder with a transurethral Dangler suture exiting the urethral meatus taped to the phallus.  Other than the stent all other equipment was removed from the urinary tract.  The patient was then extubated transferred to the recovery room in good condition.  There were no complications.  The patient will have the ureteral stent left in place for approximately 2 weeks and then it will be removed via the Dangler suture in the office.    The patient will return to the office in approximately 3 months prior to which time CT stone study stone risk profile uric acid level comprehensive metabolic panel PTH level will be performed.       I was present for the entire procedure and performed the entire procedure myself.    Patient Disposition:  PACU         SIGNATURE: Salvatore Celis MD  DATE: March 29, 2024  TIME: 10:18 AM

## 2024-03-29 NOTE — ANESTHESIA PREPROCEDURE EVALUATION
Procedure:  CYSTO USCOPE W/  LASER, &  STENT (Right: Ureter)    Relevant Problems   GI/HEPATIC   (+) GERD (gastroesophageal reflux disease)      /RENAL   (+) Nephrolithiasis      HEMATOLOGY   (+) Iron deficiency anemia      MUSCULOSKELETAL   (+) Acute left-sided low back pain without sciatica      Neurology/Sleep   (+) Memory loss      FEN/Gastrointestinal   (+) Tsai esophagus        Physical Exam    Airway    Mallampati score: II         Dental       Cardiovascular  Rhythm: regular    Pulmonary   Breath sounds clear to auscultation    Other Findings        Anesthesia Plan  ASA Score- 3     Anesthesia Type- general with ASA Monitors.         Additional Monitors:     Airway Plan:            Plan Factors-Exercise tolerance (METS): >4 METS.    Chart reviewed.   Existing labs reviewed. Patient summary reviewed.    Patient is not a current smoker.      There is medical exclusion for perioperative obstructive sleep apnea risk education.        Induction- intravenous.    Postoperative Plan-     Informed Consent- Anesthetic plan and risks discussed with patient.

## 2024-03-29 NOTE — ANESTHESIA POSTPROCEDURE EVALUATION
Post-Op Assessment Note    CV Status:  Stable    Pain management: adequate       Mental Status:  Alert and awake   Hydration Status:  Euvolemic   PONV Controlled:  Controlled   Airway Patency:  Patent     Post Op Vitals Reviewed: Yes    No anethesia notable event occurred.    Staff: Anesthesiologist               /70 (03/29/24 1155)    Temp (!) 97.4 °F (36.3 °C) (03/29/24 1155)    Pulse 60 (03/29/24 1155)   Resp 16 (03/29/24 1155)    SpO2 95 % (03/29/24 1155)

## 2024-03-29 NOTE — INTERVAL H&P NOTE
H&P reviewed. After examining the patient I find no changes in the patients condition since the H&P had been written.    Vitals:    03/29/24 0542   BP: 131/83   Pulse: 56   Resp: 16   Temp: 97.8 °F (36.6 °C)   SpO2: 100%     
General

## 2024-03-30 ENCOUNTER — NURSE TRIAGE (OUTPATIENT)
Dept: OTHER | Facility: OTHER | Age: 74
End: 2024-03-30

## 2024-03-30 ENCOUNTER — APPOINTMENT (OUTPATIENT)
Dept: RADIOLOGY | Age: 74
End: 2024-03-30
Payer: MEDICARE

## 2024-03-30 DIAGNOSIS — N20.0 NEPHROLITHIASIS: ICD-10-CM

## 2024-03-30 DIAGNOSIS — N20.0 NEPHROLITHIASIS: Primary | ICD-10-CM

## 2024-03-30 PROCEDURE — 74018 RADEX ABDOMEN 1 VIEW: CPT

## 2024-03-30 RX ORDER — SOLIFENACIN SUCCINATE 5 MG/1
5 TABLET, FILM COATED ORAL DAILY PRN
Qty: 14 TABLET | Refills: 0 | Status: SHIPPED | OUTPATIENT
Start: 2024-03-30 | End: 2024-04-13

## 2024-03-30 NOTE — TELEPHONE ENCOUNTER
Health call reached out to urology on-call.  Reporting that patient has developed severe urinary incontinence ureteroscopy with Dr. Celis.  So severe that patient is unable to leave the bathroom due to fear of incontinence.    Patient has a ureteral stent with a string.  Possibility that this is malpositioned.  Versus bladder spasm.  Discussed with health call will prescribe antispasmodic.  Attempted to prescribe oxybutynin 5 mg extended release not covered by patient's insurance.  Switch to Vesicare 5 mg daily as needed. to take as needed for urinary incontinence.  And KUB to evaluate for stent position as per documentation was required for 2 weeks.  If persistent or urinary incontinence so severe that patient is saturating himself most likely malpositioned ureteral stent and would recommend removal with close evaluation as had large stone blasted possible multiple fragments.     Please contact to reeval on Monday

## 2024-03-30 NOTE — TELEPHONE ENCOUNTER
"Reason for Disposition  • [1] Can't control passage of urine (i.e., urinary incontinence) AND [2] new-onset (< 2 weeks) or worsening    Answer Assessment - Initial Assessment Questions  1. SYMPTOM: \"What's the main symptom you're concerned about?\" (e.g., frequency, incontinence)    Urinary incontinence   2. ONSET: \"When did the  it start?\"      3/29  3. PAIN: \"Is there any pain?\" If Yes, ask: \"How bad is it?\" (Scale: 1-10; mild, moderate, severe)      4/10  4. CAUSE: \"What do you think is causing the symptoms?\"      Stent replacement   5. OTHER SYMPTOMS: \"Do you have any other symptoms?\" (e.g., fever, flank pain, blood in urine, pain with urination)     Fairly large blood clots passed since stent replacement, and incontinence started    Protocols used: Urinary Symptoms-ADULT-    "

## 2024-03-30 NOTE — TELEPHONE ENCOUNTER
s/p CYSTO USCOPE W/ LASER, & STENT (Right: Ureter)on 3/29 with Salvatore Celis MD. c/o severe incontinence onset around 2000 after passing several large blood clots. Pain manageable at 4/10 per patient. Patient currently on: oxyCODONE-acetaminophen (PERCOCET) 5-325 mg per tablet  sulfamethoxazole-trimethoprim (BACTRIM DS) 800-160 mg per tablet  tamsulosin (FLOMAX) 0.4 mg. Spouse states patient afraid to come out of bathroom due to incontinences.  On call provider contacted and informed of patient's concerns and status.    Provider advises as follow: Will send oxybutynin to help with incontinence. Possible spasms. Provider wanted it to stay for 2 weeks but if completely incontinent and Stent maybe malpositioned and may pull early. Ordered  KUB to check placement.     Informed of provider's recommendation, along with care advice. Patient denies any green tube noted in urethra.  Verbalized understanding. Agreeable with disposition. No further questions.

## 2024-03-30 NOTE — TELEPHONE ENCOUNTER
"Regarding: stent removal/ Incompetence  ----- Message from Cary Mccallum sent at 3/30/2024  2:24 AM EDT -----  \" My  had a stent removed and replaced and earlier he passed a blood clot. Since then he has not been able to go to the restroom although he has too.\"    "

## 2024-04-01 ENCOUNTER — TELEPHONE (OUTPATIENT)
Dept: UROLOGY | Facility: MEDICAL CENTER | Age: 74
End: 2024-04-01

## 2024-04-01 DIAGNOSIS — N20.0 NEPHROLITHIASIS: Primary | ICD-10-CM

## 2024-04-01 NOTE — TELEPHONE ENCOUNTER
Post Op Note    Jose Raza is a 73 y.o. male s/p CYSTO USCOPE W/  LASER, &  STENT (Right: Ureter) performed 3/29/24 with Dr. Celis.  Jose Raza is seen at [facility].    How would you rate your pain on a scale from 1 to 10, 10 being the worst pain ever? 3  Have you had a fever? No    Have your bowel movements been regular? Yes  Do you have any difficulty urinating? No  If the patient has an incision- do you have any redness around the incision or any drainage from the incision? No incision   If the patient has a drain- are you having any issues with the drain? No drain   If the patient has a menendez- are you comfortable caring for your menendez? No menendez  Do you have any other questions or concerns that I can address at this time?     Spoke to pt's wife.  He is managing his pain with Tylenol.  Wife states they are not sure if the pain is from stent or from spinal issue pt is also experiencing.  Pt's urine has some blood but they were advised this is normal at this point.      Pt is scheduled for stent on string removal in [facility] (per pt request) in 2 weeks.  Pt did not wish to remove stent at home.  They will report to ER with intractable pain, N/V fever or inability to urinate.  Pt will fu per Dr. Celis in 3 mos with AP with renal US, LithoLink Kidney Panel and PTH, Uric Acid and CMP labs prior.

## 2024-04-02 NOTE — TELEPHONE ENCOUNTER
Sent it to [employer] for verification of benefits.  Will see what the outcome is and then contact the patient

## 2024-04-03 NOTE — TELEPHONE ENCOUNTER
Covered at 100%.    Called the patient and spoke with the wife, Explained the Prolia being covered at 100% and Evenity is not approved for men.  She did state that they went to a Rheumatologist and they told her the same thing with the patient.  They would like to come to the [place] office due to it being close for them.    She states that she would need to wait till after his back surgery which is 04/13/2024.  She is waiting to clarify it with Gerri at Dr. Resendez's office.  They don't want him to get any injections right now till after that.  I stated she can call within one month to get him in after his surgery.  She stated she would do that.  She wanted to know how hard would it be to get in to see Dr. Cole for this injection, would they need to wait after the injection.  I stated that I can work him in for this injection Per Dr. Cole.  Also we like to have patient wait 20 minutes after getting this injection before they leave the office.    She understood and will call back to let me know when she can schedule the appt for the first Prolia

## 2024-04-03 NOTE — TELEPHONE ENCOUNTER
Thanks for update Coagulation: Bleeding disorder either through use of anticoagulants or underlying clinical condition(s)

## 2024-04-05 ENCOUNTER — APPOINTMENT (OUTPATIENT)
Dept: LAB | Age: 74
End: 2024-04-05
Payer: MEDICARE

## 2024-04-05 ENCOUNTER — TELEPHONE (OUTPATIENT)
Dept: INTERNAL MEDICINE CLINIC | Age: 74
End: 2024-04-05

## 2024-04-05 DIAGNOSIS — Z79.01 LONG TERM (CURRENT) USE OF ANTICOAGULANTS: ICD-10-CM

## 2024-04-05 DIAGNOSIS — N20.0 NEPHROLITHIASIS: ICD-10-CM

## 2024-04-05 DIAGNOSIS — M80.00XA AGE-RELATED OSTEOPOROSIS WITH CURRENT PATHOLOGICAL FRACTURE, INITIAL ENCOUNTER: ICD-10-CM

## 2024-04-05 DIAGNOSIS — Z01.812 PRE-OPERATIVE LABORATORY EXAMINATION: ICD-10-CM

## 2024-04-05 DIAGNOSIS — Z79.899 ENCOUNTER FOR LONG-TERM (CURRENT) USE OF OTHER MEDICATIONS: ICD-10-CM

## 2024-04-05 LAB
ALBUMIN SERPL BCP-MCNC: 4.3 G/DL (ref 3.5–5)
ALP SERPL-CCNC: 99 U/L (ref 34–104)
ALT SERPL W P-5'-P-CCNC: 25 U/L (ref 7–52)
ANION GAP SERPL CALCULATED.3IONS-SCNC: 9 MMOL/L (ref 4–13)
APTT PPP: 29 SECONDS (ref 23–37)
AST SERPL W P-5'-P-CCNC: 27 U/L (ref 13–39)
BILIRUB SERPL-MCNC: 0.84 MG/DL (ref 0.2–1)
BUN SERPL-MCNC: 11 MG/DL (ref 5–25)
CALCIUM SERPL-MCNC: 9.5 MG/DL (ref 8.4–10.2)
CHLORIDE SERPL-SCNC: 100 MMOL/L (ref 96–108)
CO2 SERPL-SCNC: 29 MMOL/L (ref 21–32)
CREAT SERPL-MCNC: 0.88 MG/DL (ref 0.6–1.3)
EST. AVERAGE GLUCOSE BLD GHB EST-MCNC: 103 MG/DL
GFR SERPL CREATININE-BSD FRML MDRD: 85 ML/MIN/1.73SQ M
GLUCOSE P FAST SERPL-MCNC: 79 MG/DL (ref 65–99)
HBA1C MFR BLD: 5.2 %
INR PPP: 1.06 (ref 0.84–1.19)
POTASSIUM SERPL-SCNC: 5.3 MMOL/L (ref 3.5–5.3)
PROT SERPL-MCNC: 6.8 G/DL (ref 6.4–8.4)
PROTHROMBIN TIME: 13.7 SECONDS (ref 11.6–14.5)
PTH-INTACT SERPL-MCNC: 54 PG/ML (ref 12–88)
SODIUM SERPL-SCNC: 138 MMOL/L (ref 135–147)
URATE SERPL-MCNC: 3.7 MG/DL (ref 3.5–8.5)

## 2024-04-05 PROCEDURE — 83036 HEMOGLOBIN GLYCOSYLATED A1C: CPT

## 2024-04-05 PROCEDURE — 83970 ASSAY OF PARATHORMONE: CPT

## 2024-04-05 PROCEDURE — 80053 COMPREHEN METABOLIC PANEL: CPT

## 2024-04-05 PROCEDURE — 85730 THROMBOPLASTIN TIME PARTIAL: CPT

## 2024-04-05 PROCEDURE — 36415 COLL VENOUS BLD VENIPUNCTURE: CPT

## 2024-04-05 PROCEDURE — 84550 ASSAY OF BLOOD/URIC ACID: CPT

## 2024-04-05 PROCEDURE — 85610 PROTHROMBIN TIME: CPT

## 2024-04-05 NOTE — TELEPHONE ENCOUNTER
Patient wife is calling and stated needs to make appointment for her  to get his Prolia shot and wanted to speak to you to make sure was approved

## 2024-04-06 ENCOUNTER — APPOINTMENT (OUTPATIENT)
Dept: LAB | Facility: CLINIC | Age: 74
End: 2024-04-06
Payer: MEDICARE

## 2024-04-06 LAB
AMORPH URATE CRY URNS QL MICRO: ABNORMAL
BACTERIA UR QL AUTO: ABNORMAL /HPF
BILIRUB UR QL STRIP: NEGATIVE
CLARITY UR: ABNORMAL
COLOR UR: YELLOW
GLUCOSE UR STRIP-MCNC: NEGATIVE MG/DL
HGB UR QL STRIP.AUTO: ABNORMAL
KETONES UR STRIP-MCNC: NEGATIVE MG/DL
LEUKOCYTE ESTERASE UR QL STRIP: ABNORMAL
MUCOUS THREADS UR QL AUTO: ABNORMAL
NITRITE UR QL STRIP: NEGATIVE
NON-SQ EPI CELLS URNS QL MICRO: ABNORMAL /HPF
PH UR STRIP.AUTO: 8 [PH]
PROT UR STRIP-MCNC: ABNORMAL MG/DL
RBC #/AREA URNS AUTO: ABNORMAL /HPF
SP GR UR STRIP.AUTO: 1.01 (ref 1–1.03)
UROBILINOGEN UR STRIP-ACNC: <2 MG/DL
WBC #/AREA URNS AUTO: ABNORMAL /HPF

## 2024-04-06 PROCEDURE — 81001 URINALYSIS AUTO W/SCOPE: CPT

## 2024-04-08 ENCOUNTER — TELEPHONE (OUTPATIENT)
Dept: RADIOLOGY | Facility: HOSPITAL | Age: 74
End: 2024-04-08

## 2024-04-08 NOTE — PRE-PROCEDURE INSTRUCTIONS
Pre-procedure Instructions for Interventional Radiology  [address] [address]  INTERVENTIONAL RADIOLOGY [phone]    You are scheduled for a/an kyphoplasty.    On Tuesday 4-16-24.    Your tentative arrival time is 7am.  Short stay will notify you the day before your procedure with the exact arrival time and the location to arrive.    To prepare for your procedure:  Please arrange for someone to drive you home after the procedure and stay with you until the next morning if you are instructed to do so.  This is typically for patients receiving some type of sedative or anesthetic for the procedure.  DO NOT EAT OR DRINK ANYTHING after midnight on the evening before your procedure including candy & gum.  ONLY SIPS OF WATER with your medications are allowed on the morning of your procedure.  TAKE ALL OF YOUR REGULAR MEDICATIONS THE MORNING OF YOUR PROCEDURE with sips of water!  We may call you to stop some of your blood sugar, blood pressure and blood thinning medications depending on the procedure.  Please take all of these medications unless we instruct you to stop them.  If you have an allergy to x-ray dye, please contact Interventional Radiology for an x-ray dye preparation which usually consists of an oral steroid and Benadryl.    The day of your procedure:  Bring a list of the medications you take at home.  Bring medications you take for breathing problems (such as inhalers), medications for chest pain, or both.  Bring a case for your glasses or contacts.  Bring your insurance card and a form of photo ID.  Please leave all valuables such as credit cards and jewelry at home.  Report to the admitting office to the left of the registration desk in the main lobby at the [facility], Entrance B.  You will then be directed to the Short Stay Center.  While your procedure is being performed, your family may wait in the Radiology Waiting Room on the 1st floor in Radiology.   if they need to leave, they may provide a number to be called following the procedure.   Be prepared to stay overnight just in case. Sometimes procedures will indicate the need for further observation or treatment.   If you are scheduled for a follow-up visit with the Interventional Radiologist after your procedure, you will be called with a date and time.    Special Instructions (Medications to stop taking before your procedure etc.):  Need to obtain medical clearance.  (Appt 4-10-24.)

## 2024-04-10 ENCOUNTER — CONSULT (OUTPATIENT)
Dept: INTERNAL MEDICINE CLINIC | Age: 74
End: 2024-04-10
Payer: MEDICARE

## 2024-04-10 VITALS
HEART RATE: 60 BPM | BODY MASS INDEX: 23.34 KG/M2 | WEIGHT: 154 LBS | DIASTOLIC BLOOD PRESSURE: 66 MMHG | SYSTOLIC BLOOD PRESSURE: 122 MMHG | HEIGHT: 68 IN | OXYGEN SATURATION: 97 % | TEMPERATURE: 99.2 F

## 2024-04-10 DIAGNOSIS — N20.0 NEPHROLITHIASIS: ICD-10-CM

## 2024-04-10 DIAGNOSIS — S32.050D COMPRESSION FRACTURE OF L5 VERTEBRA WITH ROUTINE HEALING, SUBSEQUENT ENCOUNTER: ICD-10-CM

## 2024-04-10 DIAGNOSIS — Z01.818 PRE-OP EXAMINATION: Primary | ICD-10-CM

## 2024-04-10 DIAGNOSIS — S22.000A COMPRESSION FRACTURE OF BODY OF THORACIC VERTEBRA (HCC): ICD-10-CM

## 2024-04-10 DIAGNOSIS — M80.00XD AGE-RELATED OSTEOPOROSIS WITH CURRENT PATHOLOGICAL FRACTURE WITH ROUTINE HEALING, SUBSEQUENT ENCOUNTER: ICD-10-CM

## 2024-04-10 PROCEDURE — 99214 OFFICE O/P EST MOD 30 MIN: CPT | Performed by: PHYSICIAN ASSISTANT

## 2024-04-10 RX ORDER — PSYLLIUM HUSK 0.4 G
CAPSULE ORAL
COMMUNITY
Start: 2024-03-29

## 2024-04-10 NOTE — LETTER
April 10, 2024     Johnson Putnam MD  [address]  [address]  [place] [address]    Patient: Jose Raza   YOB: 1950   Date of Visit: 4/10/2024       Dear Dr. Putnam:    Thank you for referring Jose Raza to me for evaluation. Below are my notes for this consultation.    If you have questions, please do not hesitate to call me. I look forward to following your patient along with you.         Sincerely,        Hannah Sheldon PA-C        CC: No Recipients    Hannah Sheldon PA-C  4/10/2024  6:29 PM  Sign when Signing Visit   Assessment/Plan:         Diagnoses and all orders for this visit:    Pre-op examination  Comments:  6 METS  optimized for surgery  hold omega 3 fish oils    Compression fracture of body of thoracic vertebra (HCC)    Compression fracture of L5 vertebra with routine healing, subsequent encounter    Age-related osteoporosis with current pathological fracture with routine healing, subsequent encounter  Comments:  prolia has been approved for pt  would wait until recovered kyphoplasty procedure    Nephrolithiasis  Comments:  following with urology  for stent removal 4/12- to make neurosx aware of this procedure    Other orders  -     Calcium Carb-Cholecalciferol (Calcium 1000 + D) 1000-20 MG-MCG TABS  -     Magnesium Citrate (CITROMA PO); As directed          Subjective:      Patient ID: Jose Raza is a 73 y.o. male.    Presurgical Evaluation    Subjective:     Patient ID: Jose Raza is a 73 y.o. male.    Patient presents with:  Pre-op Exam: Preop back surgery Dr Doll 4/16/24      The following portions of the patient's history were reviewed and updated as appropriate: allergies, current medications, past family history, past medical history, past social history, past surgical history, and problem list.    Procedure date: 4/16/24    Surgeon:  Dr Doll   Planned procedure:  T12, L1 and L5 kyphoplasty   Diagnosis for procedure:  compression fracture     Prior  anesthesia: Yes   General; Complications:  None / Tolerated well    CAD History: None     Pulmonary History: None    Renal history: None    Diabetes History:  None     Neurological History: None     On Immunosuppressant meds/biologics: No      Preop labs/testing available and reviewed: yes    eGFR       Date                     Value               Ref Range           Status                04/05/2024               85                  ml/min/1.73sq m     Final            ----------  WBC       Date                     Value               Ref Range           Status                03/18/2024               4.93                4.31 - 10.16 T*     Final            ----------  Hemoglobin       Date                     Value               Ref Range           Status                03/18/2024               13.9                12.0 - 17.0 g/*     Final            ----------  Hematocrit       Date                     Value               Ref Range           Status                03/18/2024               43.0                36.5 - 49.3 %       Final            ----------  Platelets       Date                     Value               Ref Range           Status                03/18/2024               189                 149 - 390 Thou*     Final            ----------  INR       Date                     Value               Ref Range           Status                04/05/2024               1.06                0.84 - 1.19         Final            ----------    EKG yes    Functional capacity: Shovel snow                          6 Mets   Pick the highest level patient can comfortably perform   4 mets or greater for surgery    RCRI  High Risk surgery?         1 Point  CAD History:         1 Point   MI; Positive Stress Test; CP due to Mi;  Nitrate Usage to control Angina; Pathologic Q wave on EKG  CHF Active:         1 Point   Pulm Edema; Paroxysmal Nocturnal Dyspnea;  Bibasilar Rales (crackles);S3; CHF on CXR  Cerebrovascular Disease (TIA  "or CVA):     1 Point  DM on Insulin:        1 Point  Serum Creat >2.0 mg/dl:       1 Point          Total Points: 0     Scorin: Class I, Very Low Risk (0.4%)     1: Class II, Low risk (0.9%)     2: Class III Moderate (6.6%)     3: Class IV High (>11%)      MARKIE Risk:  GFR: eGFR       Date                     Value               Ref Range           Status                2024               85                  ml/min/1.73sq m     Final            ----------      For PCP:  If GFR>60, Hold ACE/ARB/Diuretic on the day of surgery, and NSAIDS 10 days before.    If GFR<45, Consider PRE and POST op Nephrology Consult.    If 46 <GFR> 59 : Has Patient had MARKIE in last 6 Months? yes   If YES: Preop Nephrology consult   If No:  Post Op Nephrology consult.            NOTE: Please use the above to review important meds for your specialty, the remainder \"per anesthesia Guidelines.\"    NOTE: Please place an Inbasket message for \"SOC\" pool for complicated patients.                The following portions of the patient's history were reviewed and updated as appropriate: allergies, current medications, past family history, past medical history, past social history, past surgical history, and problem list.    Review of Systems   Constitutional:  Negative for activity change, appetite change, chills, diaphoresis and fever.   HENT:  Negative for congestion and sore throat.    Eyes:  Negative for pain and redness.   Respiratory:  Negative for cough, chest tightness, shortness of breath and wheezing.    Cardiovascular:  Negative for chest pain, palpitations and leg swelling.   Gastrointestinal:  Negative for abdominal pain, constipation, diarrhea and nausea.   Genitourinary:  Negative for dysuria.   Musculoskeletal:  Positive for arthralgias, back pain and gait problem. Negative for joint swelling, myalgias, neck pain and neck stiffness.   Skin:  Negative for rash.   Neurological:  Negative for dizziness, light-headedness and " headaches.   Psychiatric/Behavioral:  Negative for sleep disturbance. The patient is not nervous/anxious.          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 famotidine for years   • Compression fracture of body of thoracic vertebra (HCC) 02/2024    T12, L5, L1   • Marleni Marcelo virus infection 03/09/2024   • GERD (gastroesophageal reflux disease) 3/29/2024   • Kidney stone    • Memory loss 3/29/2024   • Osteoporosis          Current Outpatient Medications:   •  Calcium Carb-Cholecalciferol (Calcium 1000 + D) 1000-20 MG-MCG TABS, , Disp: , Rfl:   •  cycloSPORINE (Restasis) 0.05 % ophthalmic emulsion, Administer 1 drop to both eyes every 12 (twelve) hours, Disp: , Rfl:   •  Fide 500 MG CAPS, Take by mouth 500 mg daily, Disp: , Rfl:   •  MAGNESIUM CARBONATE PO, Take 1 tablet by mouth in the morning, Disp: , Rfl:   •  Magnesium Citrate (CITROMA PO), As directed, Disp: , Rfl:   •  Multiple Vitamin (Multi Vitamin Daily) TABS, Take by mouth, Disp: , Rfl:   •  NON FORMULARY, Triple magnesium (glycinate 10%, oxide 60%, malate 30%,), Disp: , Rfl:   •  Omega-3 Fatty Acids (fish oil) 1,000 mg, Take 2,000 mg by mouth daily, Disp: , Rfl:   •  patient supplied medication, Take 1 each by mouth daily One probiotic daily, Disp: , Rfl:   •  Pyridoxine HCl (Vitamin B6) 100 MG TABS, , Disp: , Rfl:   •  risedronate (ACTONEL) 35 mg tablet, Take 1 tablet (35 mg total) by mouth every 7 days with water on empty stomach, nothing by mouth or lie down for next 30 minutes., Disp: 12 tablet, Rfl: 1  •  sulfamethoxazole-trimethoprim (BACTRIM DS) 800-160 mg per tablet, Take 1 tablet by mouth daily for 14 days, Disp: 14 tablet, Rfl: 0  •  tamsulosin (FLOMAX) 0.4 mg, Take 1 capsule (0.4 mg total) by mouth daily with dinner, Disp: 90 capsule, Rfl: 3    No Known Allergies    Social History  Past Surgical History:   Procedure Laterality Date   • COLONOSCOPY      Within last 5 years   • FL RETROGRADE PYELOGRAM   "3/29/2024   • HERNIA REPAIR     • KNEE SURGERY     • ID CYSTO/URETERO W/LITHOTRIPSY &INDWELL STENT INSRT Right 3/29/2024    Procedure: CYSTO USCOPE W/  LASER, &  STENT;  Surgeon: Salvatore Celis MD;  Location: [facility];  Service: Urology   • UPPER GASTROINTESTINAL ENDOSCOPY       Family History   Problem Relation Age of Onset   • Brain cancer Mother    • Cancer Mother    • Aneurysm Father        Objective:  /66 (BP Location: Left arm, Patient Position: Sitting, Cuff Size: Standard)   Pulse 60   Temp 99.2 °F (37.3 °C) (Temporal)   Ht 5' 8\" (1.727 m)   Wt 69.9 kg (154 lb)   SpO2 97%   BMI 23.42 kg/m²        Physical Exam  Vitals reviewed.   Constitutional:       General: He is not in acute distress.  HENT:      Head: Normocephalic and atraumatic.      Right Ear: Tympanic membrane, ear canal and external ear normal. There is no impacted cerumen.      Left Ear: Tympanic membrane, ear canal and external ear normal. There is no impacted cerumen.      Nose: Nose normal.      Mouth/Throat:      Mouth: Mucous membranes are moist.   Eyes:      General:         Right eye: No discharge.         Left eye: No discharge.      Extraocular Movements: Extraocular movements intact.      Conjunctiva/sclera: Conjunctivae normal.      Pupils: Pupils are equal, round, and reactive to light.   Cardiovascular:      Rate and Rhythm: Normal rate and regular rhythm.   Pulmonary:      Effort: Pulmonary effort is normal. No respiratory distress.      Breath sounds: Normal breath sounds. No wheezing or rales.   Musculoskeletal:      Cervical back: Normal range of motion. No rigidity.      Right lower leg: No edema.      Left lower leg: No edema.      Comments: Wearing back brace   Lymphadenopathy:      Cervical: No cervical adenopathy.   Skin:     General: Skin is warm.   Neurological:      Mental Status: He is alert.   Psychiatric:         Mood and Affect: Mood normal.         "

## 2024-04-10 NOTE — PROGRESS NOTES
Assessment/Plan:         Diagnoses and all orders for this visit:    Pre-op examination  Comments:  6 METS  optimized for surgery  hold omega 3 fish oils    Compression fracture of body of thoracic vertebra (HCC)    Compression fracture of L5 vertebra with routine healing, subsequent encounter    Age-related osteoporosis with current pathological fracture with routine healing, subsequent encounter  Comments:  prolia has been approved for pt  would wait until recovered kyphoplasty procedure    Nephrolithiasis  Comments:  following with urology  for stent removal 4/12- to make neurosx aware of this procedure    Other orders  -     Calcium Carb-Cholecalciferol (Calcium 1000 + D) 1000-20 MG-MCG TABS  -     Magnesium Citrate (CITROMA PO); As directed          Subjective:      Patient ID: Jose Raza is a 73 y.o. male.    Presurgical Evaluation    Subjective:     Patient ID: Jose Raza is a 73 y.o. male.    Patient presents with:  Pre-op Exam: Preop back surgery Dr Doll 4/16/24      The following portions of the patient's history were reviewed and updated as appropriate: allergies, current medications, past family history, past medical history, past social history, past surgical history, and problem list.    Procedure date: 4/16/24    Surgeon:  Dr Doll   Planned procedure:  T12, L1 and L5 kyphoplasty   Diagnosis for procedure:  compression fracture     Prior anesthesia: Yes   General; Complications:  None / Tolerated well    CAD History: None     Pulmonary History: None    Renal history: None    Diabetes History:  None     Neurological History: None     On Immunosuppressant meds/biologics: No      Preop labs/testing available and reviewed: yes    eGFR       Date                     Value               Ref Range           Status                04/05/2024               85                  ml/min/1.73sq m     Final            ----------  WBC       Date                     Value               Ref Range            Status                2024               4.93                4.31 - 10.16 T*     Final            ----------  Hemoglobin       Date                     Value               Ref Range           Status                2024               13.9                12.0 - 17.0 g/*     Final            ----------  Hematocrit       Date                     Value               Ref Range           Status                2024               43.0                36.5 - 49.3 %       Final            ----------  Platelets       Date                     Value               Ref Range           Status                2024               189                 149 - 390 Thou*     Final            ----------  INR       Date                     Value               Ref Range           Status                2024               1.06                0.84 - 1.19         Final            ----------    EKG yes    Functional capacity: Shovel snow                          6 Mets   Pick the highest level patient can comfortably perform   4 mets or greater for surgery    RCRI  High Risk surgery?         1 Point  CAD History:         1 Point   MI; Positive Stress Test; CP due to Mi;  Nitrate Usage to control Angina; Pathologic Q wave on EKG  CHF Active:         1 Point   Pulm Edema; Paroxysmal Nocturnal Dyspnea;  Bibasilar Rales (crackles);S3; CHF on CXR  Cerebrovascular Disease (TIA or CVA):     1 Point  DM on Insulin:        1 Point  Serum Creat >2.0 mg/dl:       1 Point          Total Points: 0     Scorin: Class I, Very Low Risk (0.4%)     1: Class II, Low risk (0.9%)     2: Class III Moderate (6.6%)     3: Class IV High (>11%)      MARKIE Risk:  GFR: eGFR       Date                     Value               Ref Range           Status                2024               85                  ml/min/1.73sq m     Final            ----------      For PCP:  If GFR>60, Hold ACE/ARB/Diuretic on the day of surgery, and NSAIDS 10 days  "before.    If GFR<45, Consider PRE and POST op Nephrology Consult.    If 46 <GFR> 59 : Has Patient had MARKIE in last 6 Months? yes   If YES: Preop Nephrology consult   If No:  Post Op Nephrology consult.            NOTE: Please use the above to review important meds for your specialty, the remainder \"per anesthesia Guidelines.\"    NOTE: Please place an Inbasket message for \"SOC\" pool for complicated patients.                The following portions of the patient's history were reviewed and updated as appropriate: allergies, current medications, past family history, past medical history, past social history, past surgical history, and problem list.    Review of Systems   Constitutional:  Negative for activity change, appetite change, chills, diaphoresis and fever.   HENT:  Negative for congestion and sore throat.    Eyes:  Negative for pain and redness.   Respiratory:  Negative for cough, chest tightness, shortness of breath and wheezing.    Cardiovascular:  Negative for chest pain, palpitations and leg swelling.   Gastrointestinal:  Negative for abdominal pain, constipation, diarrhea and nausea.   Genitourinary:  Negative for dysuria.   Musculoskeletal:  Positive for arthralgias, back pain and gait problem. Negative for joint swelling, myalgias, neck pain and neck stiffness.   Skin:  Negative for rash.   Neurological:  Negative for dizziness, light-headedness and headaches.   Psychiatric/Behavioral:  Negative for sleep disturbance. The patient is not nervous/anxious.          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    Took famotidine for years    Compression fracture of body of thoracic vertebra (HCC) 02/2024    T12, L5, L1    Marleni Marcelo virus infection 03/09/2024    GERD (gastroesophageal reflux disease) 3/29/2024    Kidney stone     Memory loss 3/29/2024    Osteoporosis          Current Outpatient Medications:     Calcium Carb-Cholecalciferol (Calcium 1000 + D) 1000-20 MG-MCG TABS, , " "Disp: , Rfl:     cycloSPORINE (Restasis) 0.05 % ophthalmic emulsion, Administer 1 drop to both eyes every 12 (twelve) hours, Disp: , Rfl:     Fide 500 MG CAPS, Take by mouth 500 mg daily, Disp: , Rfl:     MAGNESIUM CARBONATE PO, Take 1 tablet by mouth in the morning, Disp: , Rfl:     Magnesium Citrate (CITROMA PO), As directed, Disp: , Rfl:     Multiple Vitamin (Multi Vitamin Daily) TABS, Take by mouth, Disp: , Rfl:     NON FORMULARY, Triple magnesium (glycinate 10%, oxide 60%, malate 30%,), Disp: , Rfl:     Omega-3 Fatty Acids (fish oil) 1,000 mg, Take 2,000 mg by mouth daily, Disp: , Rfl:     patient supplied medication, Take 1 each by mouth daily One probiotic daily, Disp: , Rfl:     Pyridoxine HCl (Vitamin B6) 100 MG TABS, , Disp: , Rfl:     risedronate (ACTONEL) 35 mg tablet, Take 1 tablet (35 mg total) by mouth every 7 days with water on empty stomach, nothing by mouth or lie down for next 30 minutes., Disp: 12 tablet, Rfl: 1    sulfamethoxazole-trimethoprim (BACTRIM DS) 800-160 mg per tablet, Take 1 tablet by mouth daily for 14 days, Disp: 14 tablet, Rfl: 0    tamsulosin (FLOMAX) 0.4 mg, Take 1 capsule (0.4 mg total) by mouth daily with dinner, Disp: 90 capsule, Rfl: 3    No Known Allergies    Social History   Past Surgical History:   Procedure Laterality Date    COLONOSCOPY      Within last 5 years    FL RETROGRADE PYELOGRAM  3/29/2024    HERNIA REPAIR      KNEE SURGERY      AL CYSTO/URETERO W/LITHOTRIPSY &INDWELL STENT INSRT Right 3/29/2024    Procedure: CYSTO USCOPE W/  LASER, &  STENT;  Surgeon: Salvatore Celis MD;  Location: [facility];  Service: Urology    UPPER GASTROINTESTINAL ENDOSCOPY       Family History   Problem Relation Age of Onset    Brain cancer Mother     Cancer Mother     Aneurysm Father        Objective:  /66 (BP Location: Left arm, Patient Position: Sitting, Cuff Size: Standard)   Pulse 60   Temp 99.2 °F (37.3 °C) (Temporal)   Ht 5' 8\" (1.727 m)   Wt 69.9 kg (154 lb)   " SpO2 97%   BMI 23.42 kg/m²        Physical Exam  Vitals reviewed.   Constitutional:       General: He is not in acute distress.  HENT:      Head: Normocephalic and atraumatic.      Right Ear: Tympanic membrane, ear canal and external ear normal. There is no impacted cerumen.      Left Ear: Tympanic membrane, ear canal and external ear normal. There is no impacted cerumen.      Nose: Nose normal.      Mouth/Throat:      Mouth: Mucous membranes are moist.   Eyes:      General:         Right eye: No discharge.         Left eye: No discharge.      Extraocular Movements: Extraocular movements intact.      Conjunctiva/sclera: Conjunctivae normal.      Pupils: Pupils are equal, round, and reactive to light.   Cardiovascular:      Rate and Rhythm: Normal rate and regular rhythm.   Pulmonary:      Effort: Pulmonary effort is normal. No respiratory distress.      Breath sounds: Normal breath sounds. No wheezing or rales.   Musculoskeletal:      Cervical back: Normal range of motion. No rigidity.      Right lower leg: No edema.      Left lower leg: No edema.      Comments: Wearing back brace   Lymphadenopathy:      Cervical: No cervical adenopathy.   Skin:     General: Skin is warm.   Neurological:      Mental Status: He is alert.   Psychiatric:         Mood and Affect: Mood normal.

## 2024-04-10 NOTE — TELEPHONE ENCOUNTER
Called the wife Alicia back and she stated that he is going to get a procedure/injections done on 04/16/2024.  I told her that they will probably will have you wait for 6-8 weeks after this procedure to get any shots or vaccines.  They don't want any interactions to happen on him.    She will call back later than to schedule the Prolia shot when it is around that time

## 2024-04-10 NOTE — LETTER
April 10, 2024     Johnson Putnam MD  [address] [facility]  [address]  [facility] [address]    Patient: Jose Raza   YOB: 1950   Date of Visit: 4/10/2024       Dear Dr. Putnam:    Thank you for referring Jose Raza to me for evaluation. Below are my notes for this consultation.    If you have questions, please do not hesitate to call me. I look forward to following your patient along with you.         Sincerely,        Hannah Sheldon PA-C        CC: No Recipients    Hannah Sheldon PA-C  4/10/2024  6:27 PM  Incomplete   Assessment/Plan:         Diagnoses and all orders for this visit:    Pre-op examination  Comments:  6 METS  optimized for surgery  hold omega 3 fish oils    Compression fracture of body of thoracic vertebra (HCC)    Compression fracture of L5 vertebra with routine healing, subsequent encounter    Age-related osteoporosis with current pathological fracture with routine healing, subsequent encounter  Comments:  prolia has been approved for pt  would wait until recovered kyphoplasty procedure    Nephrolithiasis  Comments:  following with urology  for stent removal 4/12    Other orders  -     Calcium Carb-Cholecalciferol (Calcium 1000 + D) 1000-20 MG-MCG TABS  -     Magnesium Citrate (CITROMA PO); As directed          Subjective:      Patient ID: Joes Raza is a 73 y.o. male.    Presurgical Evaluation    Subjective:     Patient ID: Jose Raza is a 73 y.o. male.    Patient presents with:  Pre-op Exam: Preop back surgery Dr Doll 4/16/24      The following portions of the patient's history were reviewed and updated as appropriate: allergies, current medications, past family history, past medical history, past social history, past surgical history, and problem list.    Procedure date: 4/16/24    Surgeon:  Dr Doll   Planned procedure:  T12, L1 and L5 kyphoplasty   Diagnosis for procedure:  compression fracture     Prior anesthesia: Yes   General; Complications:  None /  Tolerated well    CAD History: None     Pulmonary History: None    Renal history: None    Diabetes History:  None     Neurological History: None     On Immunosuppressant meds/biologics: No      Preop labs/testing available and reviewed: yes    eGFR       Date                     Value               Ref Range           Status                04/05/2024               85                  ml/min/1.73sq m     Final            ----------  WBC       Date                     Value               Ref Range           Status                03/18/2024               4.93                4.31 - 10.16 T*     Final            ----------  Hemoglobin       Date                     Value               Ref Range           Status                03/18/2024               13.9                12.0 - 17.0 g/*     Final            ----------  Hematocrit       Date                     Value               Ref Range           Status                03/18/2024               43.0                36.5 - 49.3 %       Final            ----------  Platelets       Date                     Value               Ref Range           Status                03/18/2024               189                 149 - 390 Thou*     Final            ----------  INR       Date                     Value               Ref Range           Status                04/05/2024               1.06                0.84 - 1.19         Final            ----------    EKG yes    Functional capacity: Shovel snow                          6 Mets   Pick the highest level patient can comfortably perform   4 mets or greater for surgery    RCRI  High Risk surgery?         1 Point  CAD History:         1 Point   MI; Positive Stress Test; CP due to Mi;  Nitrate Usage to control Angina; Pathologic Q wave on EKG  CHF Active:         1 Point   Pulm Edema; Paroxysmal Nocturnal Dyspnea;  Bibasilar Rales (crackles);S3; CHF on CXR  Cerebrovascular Disease (TIA or CVA):     1 Point  DM on Insulin:        1  "Point  Serum Creat >2.0 mg/dl:       1 Point          Total Points: 0     Scorin: Class I, Very Low Risk (0.4%)     1: Class II, Low risk (0.9%)     2: Class III Moderate (6.6%)     3: Class IV High (>11%)      MARKIE Risk:  GFR: eGFR       Date                     Value               Ref Range           Status                2024               85                  ml/min/1.73sq m     Final            ----------      For PCP:  If GFR>60, Hold ACE/ARB/Diuretic on the day of surgery, and NSAIDS 10 days before.    If GFR<45, Consider PRE and POST op Nephrology Consult.    If 46 <GFR> 59 : Has Patient had MARKIE in last 6 Months? yes   If YES: Preop Nephrology consult   If No:  Post Op Nephrology consult.            NOTE: Please use the above to review important meds for your specialty, the remainder \"per anesthesia Guidelines.\"    NOTE: Please place an Inbasket message for \"SOC\" pool for complicated patients.                The following portions of the patient's history were reviewed and updated as appropriate: allergies, current medications, past family history, past medical history, past social history, past surgical history, and problem list.    Review of Systems   Constitutional:  Negative for activity change, appetite change, chills, diaphoresis and fever.   HENT:  Negative for congestion and sore throat.    Eyes:  Negative for pain and redness.   Respiratory:  Negative for cough, chest tightness, shortness of breath and wheezing.    Cardiovascular:  Negative for chest pain, palpitations and leg swelling.   Gastrointestinal:  Negative for abdominal pain, constipation, diarrhea and nausea.   Genitourinary:  Negative for dysuria.   Musculoskeletal:  Positive for arthralgias, back pain and gait problem. Negative for joint swelling, myalgias, neck pain and neck stiffness.   Skin:  Negative for rash.   Neurological:  Negative for dizziness, light-headedness and headaches.   Psychiatric/Behavioral:  Negative for " sleep disturbance. The patient is not nervous/anxious.          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 famotidine for years   • Compression fracture of body of thoracic vertebra (HCC) 02/2024    T12, L5, L1   • Marleni Marcelo virus infection 03/09/2024   • GERD (gastroesophageal reflux disease) 3/29/2024   • Kidney stone    • Memory loss 3/29/2024   • Osteoporosis          Current Outpatient Medications:   •  Calcium Carb-Cholecalciferol (Calcium 1000 + D) 1000-20 MG-MCG TABS, , Disp: , Rfl:   •  cycloSPORINE (Restasis) 0.05 % ophthalmic emulsion, Administer 1 drop to both eyes every 12 (twelve) hours, Disp: , Rfl:   •  Fide 500 MG CAPS, Take by mouth 500 mg daily, Disp: , Rfl:   •  MAGNESIUM CARBONATE PO, Take 1 tablet by mouth in the morning, Disp: , Rfl:   •  Magnesium Citrate (CITROMA PO), As directed, Disp: , Rfl:   •  Multiple Vitamin (Multi Vitamin Daily) TABS, Take by mouth, Disp: , Rfl:   •  NON FORMULARY, Triple magnesium (glycinate 10%, oxide 60%, malate 30%,), Disp: , Rfl:   •  Omega-3 Fatty Acids (fish oil) 1,000 mg, Take 2,000 mg by mouth daily, Disp: , Rfl:   •  patient supplied medication, Take 1 each by mouth daily One probiotic daily, Disp: , Rfl:   •  Pyridoxine HCl (Vitamin B6) 100 MG TABS, , Disp: , Rfl:   •  risedronate (ACTONEL) 35 mg tablet, Take 1 tablet (35 mg total) by mouth every 7 days with water on empty stomach, nothing by mouth or lie down for next 30 minutes., Disp: 12 tablet, Rfl: 1  •  sulfamethoxazole-trimethoprim (BACTRIM DS) 800-160 mg per tablet, Take 1 tablet by mouth daily for 14 days, Disp: 14 tablet, Rfl: 0  •  tamsulosin (FLOMAX) 0.4 mg, Take 1 capsule (0.4 mg total) by mouth daily with dinner, Disp: 90 capsule, Rfl: 3    No Known Allergies    Social History  Past Surgical History:   Procedure Laterality Date   • COLONOSCOPY      Within last 5 years   • FL RETROGRADE PYELOGRAM  3/29/2024   • HERNIA REPAIR     • KNEE SURGERY     • [place]  "CYSTO/URETERO W/LITHOTRIPSY &INDWELL STENT INSRT Right 3/29/2024    Procedure: CYSTO USCOPE W/  LASER, &  STENT;  Surgeon: Salvatore Celis MD;  Location: [facility];  Service: Urology   • UPPER GASTROINTESTINAL ENDOSCOPY       Family History   Problem Relation Age of Onset   • Brain cancer Mother    • Cancer Mother    • Aneurysm Father        Objective:  /66 (BP Location: Left arm, Patient Position: Sitting, Cuff Size: Standard)   Pulse 60   Temp 99.2 °F (37.3 °C) (Temporal)   Ht 5' 8\" (1.727 m)   Wt 69.9 kg (154 lb)   SpO2 97%   BMI 23.42 kg/m²        Physical Exam  Vitals reviewed.   Constitutional:       General: He is not in acute distress.  HENT:      Head: Normocephalic and atraumatic.      Right Ear: Tympanic membrane, ear canal and external ear normal. There is no impacted cerumen.      Left Ear: Tympanic membrane, ear canal and external ear normal. There is no impacted cerumen.      Nose: Nose normal.      Mouth/Throat:      Mouth: Mucous membranes are moist.   Eyes:      General:         Right eye: No discharge.         Left eye: No discharge.      Extraocular Movements: Extraocular movements intact.      Conjunctiva/sclera: Conjunctivae normal.      Pupils: Pupils are equal, round, and reactive to light.   Cardiovascular:      Rate and Rhythm: Normal rate and regular rhythm.   Pulmonary:      Effort: Pulmonary effort is normal. No respiratory distress.      Breath sounds: Normal breath sounds. No wheezing or rales.   Musculoskeletal:      Cervical back: Normal range of motion. No rigidity.      Right lower leg: No edema.      Left lower leg: No edema.      Comments: Wearing back brace   Lymphadenopathy:      Cervical: No cervical adenopathy.   Skin:     General: Skin is warm.   Neurological:      Mental Status: He is alert.   Psychiatric:         Mood and Affect: Mood normal.           Hannah Sheldon PA-C  4/10/2024  6:22 PM  Sign when Signing Visit   Assessment/Plan:         " {[facility]/PlanSVeter[facility] Affairs [facility]:94674}      Subjective:      Patient ID: Jose Raza is a 73 y.o. male.    Presurgical Evaluation    Subjective:     Patient ID: oJse Raza is a 73 y.o. male.    Patient presents with:  Pre-op Exam: Preop back surgery Dr Doll 4/16/24      The following portions of the patient's history were reviewed and updated as appropriate: allergies, current medications, past family history, past medical history, past social history, past surgical history, and problem list.    Procedure date: 4/16/24    Surgeon:  Dr Doll   Planned procedure:  T12, L1 and L5 kyphoplasty   Diagnosis for procedure:  compression fracture     Prior anesthesia: Yes   General; Complications:  None / Tolerated well    CAD History: None     Pulmonary History: None    Renal history: None    Diabetes History:  None     Neurological History: None     On Immunosuppressant meds/biologics: No      Preop labs/testing available and reviewed: yes    eGFR       Date                     Value               Ref Range           Status                04/05/2024               85                  ml/min/1.73sq m     Final            ----------  WBC       Date                     Value               Ref Range           Status                03/18/2024               4.93                4.31 - 10.16 T*     Final            ----------  Hemoglobin       Date                     Value               Ref Range           Status                03/18/2024               13.9                12.0 - 17.0 g/*     Final            ----------  Hematocrit       Date                     Value               Ref Range           Status                03/18/2024               43.0                36.5 - 49.3 %       Final            ----------  Platelets       Date                     Value               Ref Range           Status                03/18/2024               189                 149 - 390 Thou*     Final            ----------  INR       Date           "           Value               Ref Range           Status                2024               1.06                0.84 - 1.19         Final            ----------    EKG yes    Functional capacity: Shovel snow                          6 Mets   Pick the highest level patient can comfortably perform   4 mets or greater for surgery    RCRI  High Risk surgery?         1 Point  CAD History:         1 Point   MI; Positive Stress Test; CP due to Mi;  Nitrate Usage to control Angina; Pathologic Q wave on EKG  CHF Active:         1 Point   Pulm Edema; Paroxysmal Nocturnal Dyspnea;  Bibasilar Rales (crackles);S3; CHF on CXR  Cerebrovascular Disease (TIA or CVA):     1 Point  DM on Insulin:        1 Point  Serum Creat >2.0 mg/dl:       1 Point          Total Points: 0     Scorin: Class I, Very Low Risk (0.4%)     1: Class II, Low risk (0.9%)     2: Class III Moderate (6.6%)     3: Class IV High (>11%)      MARKIE Risk:  GFR: eGFR       Date                     Value               Ref Range           Status                2024               85                  ml/min/1.73sq m     Final            ----------      For PCP:  If GFR>60, Hold ACE/ARB/Diuretic on the day of surgery, and NSAIDS 10 days before.    If GFR<45, Consider PRE and POST op Nephrology Consult.    If 46 <GFR> 59 : Has Patient had MARKIE in last 6 Months? yes   If YES: Preop Nephrology consult   If No:  Post Op Nephrology consult.            NOTE: Please use the above to review important meds for your specialty, the remainder \"per anesthesia Guidelines.\"    NOTE: Please place an Inbasket message for \"SOC\" pool for complicated patients.                {Common ambulatory SmartLinks:66336}    Review of Systems   Constitutional:  Negative for activity change, appetite change, chills, diaphoresis and fever.   HENT:  Negative for congestion and sore throat.    Eyes:  Negative for pain and redness.   Respiratory:  Negative for cough, chest tightness, shortness " of breath and wheezing.    Cardiovascular:  Negative for chest pain, palpitations and leg swelling.   Gastrointestinal:  Negative for abdominal pain, constipation, diarrhea and nausea.   Genitourinary:  Negative for dysuria.   Musculoskeletal:  Positive for arthralgias, back pain and gait problem. Negative for joint swelling, myalgias, neck pain and neck stiffness.   Skin:  Negative for rash.   Neurological:  Negative for dizziness, light-headedness and headaches.   Psychiatric/Behavioral:  Negative for sleep disturbance. The patient is not nervous/anxious.          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 famotidine for years   • Compression fracture of body of thoracic vertebra (HCC) 02/2024    T12, L5, L1   • Marleni Marcelo virus infection 03/09/2024   • GERD (gastroesophageal reflux disease) 3/29/2024   • Kidney stone    • Memory loss 3/29/2024   • Osteoporosis          Current Outpatient Medications:   •  Calcium Carb-Cholecalciferol (Calcium 1000 + D) 1000-20 MG-MCG TABS, , Disp: , Rfl:   •  cycloSPORINE (Restasis) 0.05 % ophthalmic emulsion, Administer 1 drop to both eyes every 12 (twelve) hours, Disp: , Rfl:   •  Fide 500 MG CAPS, Take by mouth 500 mg daily, Disp: , Rfl:   •  MAGNESIUM CARBONATE PO, Take 1 tablet by mouth in the morning, Disp: , Rfl:   •  Magnesium Citrate (CITROMA PO), As directed, Disp: , Rfl:   •  Multiple Vitamin (Multi Vitamin Daily) TABS, Take by mouth, Disp: , Rfl:   •  NON FORMULARY, Triple magnesium (glycinate 10%, oxide 60%, malate 30%,), Disp: , Rfl:   •  Omega-3 Fatty Acids (fish oil) 1,000 mg, Take 2,000 mg by mouth daily, Disp: , Rfl:   •  patient supplied medication, Take 1 each by mouth daily One probiotic daily, Disp: , Rfl:   •  Pyridoxine HCl (Vitamin B6) 100 MG TABS, , Disp: , Rfl:   •  risedronate (ACTONEL) 35 mg tablet, Take 1 tablet (35 mg total) by mouth every 7 days with water on empty stomach, nothing by mouth or lie down for  "next 30 minutes., Disp: 12 tablet, Rfl: 1  •  sulfamethoxazole-trimethoprim (BACTRIM DS) 800-160 mg per tablet, Take 1 tablet by mouth daily for 14 days, Disp: 14 tablet, Rfl: 0  •  tamsulosin (FLOMAX) 0.4 mg, Take 1 capsule (0.4 mg total) by mouth daily with dinner, Disp: 90 capsule, Rfl: 3  •  polyethylene glycol (GLYCOLAX) 17 GM/SCOOP powder, Take 17 g by mouth daily (Patient not taking: Reported on 4/10/2024), Disp: , Rfl:     No Known Allergies    Social History  Past Surgical History:   Procedure Laterality Date   • COLONOSCOPY      Within last 5 years   • FL RETROGRADE PYELOGRAM  3/29/2024   • HERNIA REPAIR     • KNEE SURGERY     • [place] CYSTO/URETERO W/LITHOTRIPSY &INDWELL STENT INSRT Right 3/29/2024    Procedure: CYSTO USCOPE W/  LASER, &  STENT;  Surgeon: Salvatore Celis MD;  Location: [facility];  Service: Urology   • UPPER GASTROINTESTINAL ENDOSCOPY       Family History   Problem Relation Age of Onset   • Brain cancer Mother    • Cancer Mother    • Aneurysm Father        Objective:  /66 (BP Location: Left arm, Patient Position: Sitting, Cuff Size: Standard)   Pulse 60   Temp 99.2 °F (37.3 °C) (Temporal)   Ht 5' 8\" (1.727 m)   Wt 69.9 kg (154 lb)   SpO2 97%   BMI 23.42 kg/m²        Physical Exam  Vitals reviewed.   Constitutional:       General: He is not in acute distress.  HENT:      Head: Normocephalic and atraumatic.      Right Ear: Tympanic membrane, ear canal and external ear normal. There is no impacted cerumen.      Left Ear: Tympanic membrane, ear canal and external ear normal. There is no impacted cerumen.      Nose: Nose normal.      Mouth/Throat:      Mouth: Mucous membranes are moist.   Eyes:      General:         Right eye: No discharge.         Left eye: No discharge.      Extraocular Movements: Extraocular movements intact.      Conjunctiva/sclera: Conjunctivae normal.      Pupils: Pupils are equal, round, and reactive to light.   Cardiovascular:      Rate and Rhythm: Normal " rate and regular rhythm.   Pulmonary:      Effort: Pulmonary effort is normal. No respiratory distress.      Breath sounds: Normal breath sounds. No wheezing or rales.   Musculoskeletal:      Cervical back: Normal range of motion. No rigidity.      Right lower leg: No edema.      Left lower leg: No edema.      Comments: Wearing back brace   Lymphadenopathy:      Cervical: No cervical adenopathy.   Skin:     General: Skin is warm.   Neurological:      Mental Status: He is alert.   Psychiatric:         Mood and Affect: Mood normal.

## 2024-04-12 ENCOUNTER — PROCEDURE VISIT (OUTPATIENT)
Dept: UROLOGY | Facility: AMBULATORY SURGERY CENTER | Age: 74
End: 2024-04-12

## 2024-04-12 VITALS
HEART RATE: 60 BPM | OXYGEN SATURATION: 98 % | HEIGHT: 68 IN | WEIGHT: 151 LBS | SYSTOLIC BLOOD PRESSURE: 120 MMHG | BODY MASS INDEX: 22.88 KG/M2 | DIASTOLIC BLOOD PRESSURE: 80 MMHG

## 2024-04-12 DIAGNOSIS — N20.0 NEPHROLITHIASIS: Primary | ICD-10-CM

## 2024-04-12 PROCEDURE — 99024 POSTOP FOLLOW-UP VISIT: CPT

## 2024-04-12 NOTE — PROGRESS NOTES
"4/12/2024  Jose Raza is a 73 y.o. male  [id number]        Diagnosis  Chief Complaint    Nephrolithiasis         Patient is s/p right ureteroscopy with stone extraction on 3/29/2024 with Dr. Celis    Plan  Patient will follow up in 3 months      Procedure Stent with String Removal    Vitals:    04/12/24 1339   BP: 120/80   Pulse: 60   SpO2: 98%   Weight: 68.5 kg (151 lb)   Height: 5' 8\" (1.727 m)       Stent with string removed intact without difficulty. Reviewed post stent removal symptoms including flank pain, dysuria, and hematuria. Instructed patient to increase oral fluid intake.  Encouraged the use of NSAIDS and other prescribed pain medication as needed for discomfort. Patient instructed to call the office or report to the ER for uncontrolled pain, fever, chills, nausea or vomiting.       Rahel Bush RN   "

## 2024-04-16 ENCOUNTER — TELEPHONE (OUTPATIENT)
Dept: RADIOLOGY | Facility: HOSPITAL | Age: 74
End: 2024-04-16

## 2024-04-16 NOTE — PRE-PROCEDURE INSTRUCTIONS
Pre-procedure Instructions for Interventional Radiology  [address] [address]  INTERVENTIONAL RADIOLOGY [phone]    You are scheduled for a/an kyphoplasty.    On Tuesday 4-23-24.    Your tentative arrival time is 11am.  Short stay will notify you the day before your procedure with the exact arrival time and the location to arrive.    To prepare for your procedure:  Please arrange for someone to drive you home after the procedure and stay with you until the next morning if you are instructed to do so.  This is typically for patients receiving some type of sedative or anesthetic for the procedure.  DO NOT EAT OR DRINK ANYTHING after midnight on the evening before your procedure including candy & gum.  ONLY SIPS OF WATER with your medications are allowed on the morning of your procedure.  TAKE ALL OF YOUR REGULAR MEDICATIONS THE MORNING OF YOUR PROCEDURE with sips of water!  We may call you to stop some of your blood sugar, blood pressure and blood thinning medications depending on the procedure.  Please take all of these medications unless we instruct you to stop them.  If you have an allergy to x-ray dye, please contact Interventional Radiology for an x-ray dye preparation which usually consists of an oral steroid and Benadryl.    The day of your procedure:  Bring a list of the medications you take at home.  Bring medications you take for breathing problems (such as inhalers), medications for chest pain, or both.  Bring a case for your glasses or contacts.  Bring your insurance card and a form of photo ID.  Please leave all valuables such as credit cards and jewelry at home.  Report to the admitting office to the left of the registration desk in the main lobby at the [facility], Entrance B.  You will then be directed to the Short Stay Center.  While your procedure is being performed, your family may wait in the Radiology Waiting Room on the 1st floor in  Radiology.  if they need to leave, they may provide a number to be called following the procedure.   Be prepared to stay overnight just in case. Sometimes procedures will indicate the need for further observation or treatment.   If you are scheduled for a follow-up visit with the Interventional Radiologist after your procedure, you will be called with a date and time.    Special Instructions (Medications to stop taking before your procedure etc.):

## 2024-04-23 ENCOUNTER — ANESTHESIA (OUTPATIENT)
Dept: RADIOLOGY | Facility: HOSPITAL | Age: 74
End: 2024-04-23

## 2024-04-23 ENCOUNTER — HOSPITAL ENCOUNTER (OUTPATIENT)
Dept: RADIOLOGY | Facility: HOSPITAL | Age: 74
Discharge: HOME | End: 2024-04-23
Attending: RADIOLOGY
Payer: MEDICARE

## 2024-04-23 ENCOUNTER — RA CDI HCC (OUTPATIENT)
Dept: OTHER | Facility: HOSPITAL | Age: 74
End: 2024-04-23

## 2024-04-23 ENCOUNTER — ANESTHESIA EVENT (OUTPATIENT)
Dept: RADIOLOGY | Facility: HOSPITAL | Age: 74
End: 2024-04-23

## 2024-04-23 VITALS
TEMPERATURE: 98 F | RESPIRATION RATE: 16 BRPM | WEIGHT: 150 LBS | HEART RATE: 58 BPM | SYSTOLIC BLOOD PRESSURE: 108 MMHG | DIASTOLIC BLOOD PRESSURE: 70 MMHG | HEIGHT: 68 IN | BODY MASS INDEX: 22.73 KG/M2 | OXYGEN SATURATION: 94 %

## 2024-04-23 DIAGNOSIS — M80.00XA AGE-RELATED OSTEOPOROSIS WITH CURRENT PATHOLOGICAL FRACTURE, INITIAL ENCOUNTER: ICD-10-CM

## 2024-04-23 PROCEDURE — 22511 PERQ LUMBOSACRAL INJECTION: CPT

## 2024-04-23 PROCEDURE — 22515 PERQ VERTEBRAL AUGMENTATION: CPT | Performed by: RADIOLOGY

## 2024-04-23 PROCEDURE — 22513 PERQ VERTEBRAL AUGMENTATION: CPT | Performed by: RADIOLOGY

## 2024-04-23 PROCEDURE — 22510 PERQ CERVICOTHORACIC INJECT: CPT

## 2024-04-23 PROCEDURE — 22512 VERTEBROPLASTY ADDL INJECT: CPT

## 2024-04-23 PROCEDURE — C1713 ANCHOR/SCREW BN/BN,TIS/BN: HCPCS

## 2024-04-23 RX ORDER — ROCURONIUM BROMIDE 10 MG/ML
INJECTION, SOLUTION INTRAVENOUS AS NEEDED
Status: DISCONTINUED | OUTPATIENT
Start: 2024-04-23 | End: 2024-04-23

## 2024-04-23 RX ORDER — LIDOCAINE HYDROCHLORIDE 10 MG/ML
INJECTION, SOLUTION EPIDURAL; INFILTRATION; INTRACAUDAL; PERINEURAL AS NEEDED
Status: COMPLETED | OUTPATIENT
Start: 2024-04-23 | End: 2024-04-23

## 2024-04-23 RX ORDER — FENTANYL CITRATE/PF 50 MCG/ML
50 SYRINGE (ML) INJECTION
Status: DISCONTINUED | OUTPATIENT
Start: 2024-04-23 | End: 2024-04-24 | Stop reason: HOSPADM

## 2024-04-23 RX ORDER — DEXAMETHASONE SODIUM PHOSPHATE 10 MG/ML
8 INJECTION, SOLUTION INTRAMUSCULAR; INTRAVENOUS ONCE AS NEEDED
Status: DISCONTINUED | OUTPATIENT
Start: 2024-04-23 | End: 2024-04-24 | Stop reason: HOSPADM

## 2024-04-23 RX ORDER — BUPIVACAINE HYDROCHLORIDE AND EPINEPHRINE 5; 5 MG/ML; UG/ML
INJECTION, SOLUTION EPIDURAL; INTRACAUDAL; PERINEURAL AS NEEDED
Status: COMPLETED | OUTPATIENT
Start: 2024-04-23 | End: 2024-04-23

## 2024-04-23 RX ORDER — LIDOCAINE HYDROCHLORIDE 10 MG/ML
INJECTION, SOLUTION EPIDURAL; INFILTRATION; INTRACAUDAL; PERINEURAL AS NEEDED
Status: DISCONTINUED | OUTPATIENT
Start: 2024-04-23 | End: 2024-04-23

## 2024-04-23 RX ORDER — MIDAZOLAM HYDROCHLORIDE 2 MG/2ML
INJECTION, SOLUTION INTRAMUSCULAR; INTRAVENOUS AS NEEDED
Status: DISCONTINUED | OUTPATIENT
Start: 2024-04-23 | End: 2024-04-23

## 2024-04-23 RX ORDER — SODIUM CHLORIDE, SODIUM LACTATE, POTASSIUM CHLORIDE, CALCIUM CHLORIDE 600; 310; 30; 20 MG/100ML; MG/100ML; MG/100ML; MG/100ML
INJECTION, SOLUTION INTRAVENOUS CONTINUOUS PRN
Status: DISCONTINUED | OUTPATIENT
Start: 2024-04-23 | End: 2024-04-23

## 2024-04-23 RX ORDER — FENTANYL CITRATE 50 UG/ML
INJECTION, SOLUTION INTRAMUSCULAR; INTRAVENOUS AS NEEDED
Status: DISCONTINUED | OUTPATIENT
Start: 2024-04-23 | End: 2024-04-23

## 2024-04-23 RX ORDER — ONDANSETRON 2 MG/ML
4 INJECTION INTRAMUSCULAR; INTRAVENOUS ONCE AS NEEDED
Status: DISCONTINUED | OUTPATIENT
Start: 2024-04-23 | End: 2024-04-24 | Stop reason: HOSPADM

## 2024-04-23 RX ORDER — ONDANSETRON 2 MG/ML
INJECTION INTRAMUSCULAR; INTRAVENOUS AS NEEDED
Status: DISCONTINUED | OUTPATIENT
Start: 2024-04-23 | End: 2024-04-23

## 2024-04-23 RX ORDER — SODIUM CHLORIDE 9 MG/ML
75 INJECTION, SOLUTION INTRAVENOUS CONTINUOUS
Status: DISCONTINUED | OUTPATIENT
Start: 2024-04-23 | End: 2024-04-24 | Stop reason: HOSPADM

## 2024-04-23 RX ORDER — CEFAZOLIN SODIUM 1 G/50ML
1000 SOLUTION INTRAVENOUS ONCE
Status: COMPLETED | OUTPATIENT
Start: 2024-04-23 | End: 2024-04-23

## 2024-04-23 RX ORDER — PROPOFOL 10 MG/ML
INJECTION, EMULSION INTRAVENOUS AS NEEDED
Status: DISCONTINUED | OUTPATIENT
Start: 2024-04-23 | End: 2024-04-23

## 2024-04-23 RX ORDER — EPHEDRINE SULFATE 50 MG/ML
INJECTION INTRAVENOUS AS NEEDED
Status: DISCONTINUED | OUTPATIENT
Start: 2024-04-23 | End: 2024-04-23

## 2024-04-23 RX ORDER — DEXAMETHASONE SODIUM PHOSPHATE 10 MG/ML
INJECTION, SOLUTION INTRAMUSCULAR; INTRAVENOUS AS NEEDED
Status: DISCONTINUED | OUTPATIENT
Start: 2024-04-23 | End: 2024-04-23

## 2024-04-23 RX ORDER — PROPOFOL 10 MG/ML
INJECTION, EMULSION INTRAVENOUS CONTINUOUS PRN
Status: DISCONTINUED | OUTPATIENT
Start: 2024-04-23 | End: 2024-04-23

## 2024-04-23 RX ADMIN — SODIUM CHLORIDE, SODIUM LACTATE, POTASSIUM CHLORIDE, AND CALCIUM CHLORIDE: .6; .31; .03; .02 INJECTION, SOLUTION INTRAVENOUS at 10:45

## 2024-04-23 RX ADMIN — SUGAMMADEX 200 MG: 100 INJECTION, SOLUTION INTRAVENOUS at 11:52

## 2024-04-23 RX ADMIN — EPHEDRINE SULFATE 10 MG: 50 INJECTION, SOLUTION INTRAVENOUS at 10:23

## 2024-04-23 RX ADMIN — DEXAMETHASONE SODIUM PHOSPHATE 10 MG: 10 INJECTION, SOLUTION INTRAMUSCULAR; INTRAVENOUS at 10:13

## 2024-04-23 RX ADMIN — CEFAZOLIN SODIUM 1000 MG: 1 SOLUTION INTRAVENOUS at 09:40

## 2024-04-23 RX ADMIN — SODIUM CHLORIDE 75 ML/HR: 0.9 INJECTION, SOLUTION INTRAVENOUS at 12:15

## 2024-04-23 RX ADMIN — EPHEDRINE SULFATE 10 MG: 50 INJECTION, SOLUTION INTRAVENOUS at 10:26

## 2024-04-23 RX ADMIN — FENTANYL CITRATE 50 MCG: 50 INJECTION INTRAMUSCULAR; INTRAVENOUS at 10:05

## 2024-04-23 RX ADMIN — FENTANYL CITRATE 50 MCG: 50 INJECTION INTRAMUSCULAR; INTRAVENOUS at 11:07

## 2024-04-23 RX ADMIN — LIDOCAINE HYDROCHLORIDE 50 MG: 10 INJECTION, SOLUTION EPIDURAL; INFILTRATION; INTRACAUDAL; PERINEURAL at 10:05

## 2024-04-23 RX ADMIN — ROCURONIUM BROMIDE 50 MG: 10 INJECTION, SOLUTION INTRAVENOUS at 10:05

## 2024-04-23 RX ADMIN — PROPOFOL 150 MG: 10 INJECTION, EMULSION INTRAVENOUS at 10:05

## 2024-04-23 RX ADMIN — BUPIVACAINE HYDROCHLORIDE AND EPINEPHRINE BITARTRATE 15 ML: 5; .0091 INJECTION, SOLUTION EPIDURAL; INTRACAUDAL; PERINEURAL at 11:09

## 2024-04-23 RX ADMIN — ONDANSETRON 4 MG: 2 INJECTION INTRAMUSCULAR; INTRAVENOUS at 11:43

## 2024-04-23 RX ADMIN — PROPOFOL 80 MCG/KG/MIN: 10 INJECTION, EMULSION INTRAVENOUS at 10:10

## 2024-04-23 RX ADMIN — MIDAZOLAM 2 MG: 1 INJECTION INTRAMUSCULAR; INTRAVENOUS at 09:59

## 2024-04-23 RX ADMIN — LIDOCAINE HYDROCHLORIDE 5 ML: 10 INJECTION, SOLUTION EPIDURAL; INFILTRATION; INTRACAUDAL; PERINEURAL at 11:09

## 2024-04-23 RX ADMIN — SODIUM CHLORIDE 75 ML/HR: 0.9 INJECTION, SOLUTION INTRAVENOUS at 08:29

## 2024-04-23 RX ADMIN — PHENYLEPHRINE HYDROCHLORIDE 30 MCG/MIN: 10 INJECTION INTRAVENOUS at 10:10

## 2024-04-23 NOTE — SEDATION DOCUMENTATION
Kyphoplasty successfully completed by Dr. Putnam without complications. Tolerated well with anesthesia support. Bedrest 1h. To transport via stretcher to pacu by crna and ir rn with bedside report to be given.

## 2024-04-23 NOTE — ANESTHESIA POSTPROCEDURE EVALUATION
Post-Op Assessment Note    CV Status:  Stable  Pain Score: 0    Pain management: adequate       Mental Status:  Sleepy and arousable   Hydration Status:  Euvolemic   PONV Controlled:  Controlled   Airway Patency:  Patent     Post Op Vitals Reviewed: Yes    No anethesia notable event occurred.    Staff: Anesthesiologist, CRNA               BP   101/60   Temp 97.9 °F (36.6 °C) (04/23/24 1204)    Pulse  53   Resp   16   SpO2   100 on 4L FM

## 2024-04-23 NOTE — DISCHARGE INSTRUCTIONS
Vertebroplasty   WHAT YOU NEED TO KNOW:   Vertebroplasty is a procedure to fix broken vertebrae. Vertebrae are the round, strong bones that form your spine. You have just had treatment for one or more compression fractures. It may take several weeks for complete pain relief. Continue to use the pain medicine that has been prescribed to you as needed.     DISCHARGE INSTRUCTIONS:   Resume your normal diet and medications. Small sips of flat soda will help with nausea.    Contact Interventional Radiology at [phone] (DHAVAL PATIENTS: Contact Interventional Radiology at [phone]) (CALLIE PATIENTS: Contact Interventional Radiology at [phone]) if any of the following occur:  You have a fever greater than 101*.   You have persistent nausea or vomiting  You have worsening pain, even after you take your medicine.   You have increased trouble walking, moving around or numbness and weakness of legs.   You have pain in your ribs or lower back.   You have drainage from the area where your procedure was done.     Upon discharge from the hospital, limit your activity for the remainder of the day. No driving, operating heavy machinery or lifting heavy objects. The following morning, you may increase your activity level as tolerated.

## 2024-04-23 NOTE — ANESTHESIA PREPROCEDURE EVALUATION
Procedure:  IR KYPHOPLASTY/VERTEBROPLASTY    Relevant Problems   GI/HEPATIC   (+) GERD (gastroesophageal reflux disease)      /RENAL   (+) Nephrolithiasis      HEMATOLOGY   (+) Iron deficiency anemia      MUSCULOSKELETAL   (+) Acute left-sided low back pain without sciatica        Physical Exam    Airway    Mallampati score: II         Dental       Cardiovascular  Rhythm: regular    Pulmonary   Breath sounds clear to auscultation    Other Findings        Anesthesia Plan  ASA Score- 3     Anesthesia Type- general with ASA Monitors.         Additional Monitors:     Airway Plan: ETT.           Plan Factors-Exercise tolerance (METS): >4 METS.    Chart reviewed.   Existing labs reviewed. Patient summary reviewed.    Patient is not a current smoker.      There is medical exclusion for perioperative obstructive sleep apnea risk education.        Induction- intravenous.    Postoperative Plan-     Informed Consent- Anesthetic plan and risks discussed with patient.  I personally reviewed this patient with the CRNA. Discussed and agreed on the Anesthesia Plan with the CRNA..

## 2024-04-25 ENCOUNTER — TELEPHONE (OUTPATIENT)
Dept: NEUROSURGERY | Facility: CLINIC | Age: 74
End: 2024-04-25

## 2024-04-25 NOTE — TELEPHONE ENCOUNTER
Called Jose to check on his status following  kyphoplasty/vertebroplasty  4/23/2024 . I spoke with Alicia the patients wife who reports he is doing well overall and denies any incisional issues or fevers.     Advised that after 24 hours he may take a shower and allow soapy water to wash over the surgical site, but do not submerge in water for the first 48 hours. Went over incision care with patient including keeping it clean and dry and not to apply any creams or ointments to the site. The site should be monitored for s/s of infection such as swelling, redness, or drainage. Call the office if any of these develop.     Advised that she should take it easy for the first few days and avoid any strenuous activity or heavy lifting >10 pounds.    If needed Tylenol can be used for pain following the procedure.     Verified date/time/location of his upcoming POV on 5/13/2024 and advised him to call the office with any further questions or concerns, or if any incisional issues or fevers would arise.     Patient was appreciative for the call.

## 2024-04-29 ENCOUNTER — OFFICE VISIT (OUTPATIENT)
Dept: INTERNAL MEDICINE CLINIC | Age: 74
End: 2024-04-29
Payer: MEDICARE

## 2024-04-29 VITALS
DIASTOLIC BLOOD PRESSURE: 78 MMHG | SYSTOLIC BLOOD PRESSURE: 114 MMHG | BODY MASS INDEX: 22.85 KG/M2 | HEART RATE: 59 BPM | TEMPERATURE: 98.6 F | OXYGEN SATURATION: 99 % | HEIGHT: 68 IN | WEIGHT: 150.8 LBS

## 2024-04-29 DIAGNOSIS — R41.3 MEMORY LOSS: Primary | ICD-10-CM

## 2024-04-29 DIAGNOSIS — M80.00XD AGE-RELATED OSTEOPOROSIS WITH CURRENT PATHOLOGICAL FRACTURE WITH ROUTINE HEALING, SUBSEQUENT ENCOUNTER: ICD-10-CM

## 2024-04-29 DIAGNOSIS — S32.050D COMPRESSION FRACTURE OF L5 VERTEBRA WITH ROUTINE HEALING, SUBSEQUENT ENCOUNTER: ICD-10-CM

## 2024-04-29 DIAGNOSIS — R41.89 COGNITIVE IMPAIRMENT: ICD-10-CM

## 2024-04-29 PROCEDURE — G2211 COMPLEX E/M VISIT ADD ON: HCPCS | Performed by: INTERNAL MEDICINE

## 2024-04-29 PROCEDURE — 99214 OFFICE O/P EST MOD 30 MIN: CPT | Performed by: INTERNAL MEDICINE

## 2024-04-29 NOTE — PROGRESS NOTES
Assessment/Plan:     Diagnoses and all orders for this visit:    Memory loss  Cognitive impairment  Compression fracture of L5 vertebra with routine healing, subsequent encounter  Vertebral compression fracture s/p kyphoplasty  Age-related osteoporosis with current pathological fracture with routine healing, subsequent encounter  -     Protein electrophoresis, urine; Future  Osteoporosis with a T-score -3.7 Evenity may be the best for him but unfortunately it is not approved for the man yet it is approved only for the woman.  He is followed up by the rheumatologist and they are planning to do the Prolia injection which is approved  Cognitive impairment    Cognitive impairment patient is also normal PTH level negative for monoclonal spikes in his protein electrophoresis of the serum I will get the protein electrophoresis of the urine to rule out if there are any kappa or lambda chains also I will get serum immunoglobulins       [employer] software was used to dictate this note.  It may contain errors with dictating incorrect words or incorrect spelling. Please contact the provider directly with any questions.    Subjective:   Chief Complaint   Patient presents with    Follow-up     1 m f/u visit for multiple compression fractures.        Patient ID: Jose Raza is a 73 y.o. male.    HPI  This is a very pleasant 73 years young gentleman he is here for the follow-up he was seen by the interventional radiology for multiple compression fractures kyphoplasty was done still having the pain but he is not using any pain medication except for the Tylenol or Motrin I told him to take Tylenol maybe little bit more regularly and stay away from the Motrin if he can,  He is taking calcium and vitamin D last vitamin D level was 42.  PTH levels were normal  The impairment is a stable  The following portions of the patient's history were reviewed and updated as appropriate: allergies, current medications, past family history, past  medical history, past social history, past surgical history, and problem list.    Review of Systems   Constitutional:  Positive for fatigue. Negative for appetite change and fever.   HENT:  Negative for congestion, ear pain, hearing loss, nosebleeds, sneezing, tinnitus and voice change.    Eyes:  Negative for pain, discharge and redness.   Respiratory:  Negative for cough, chest tightness and wheezing.    Cardiovascular:  Negative for chest pain, palpitations and leg swelling.   Gastrointestinal:  Negative for abdominal pain, blood in stool, constipation, diarrhea, nausea and vomiting.   Genitourinary:  Negative for difficulty urinating, dysuria, hematuria and urgency.   Musculoskeletal:  Positive for back pain. Negative for arthralgias, gait problem and joint swelling.   Skin:  Negative for rash and wound.   Allergic/Immunologic: Negative for environmental allergies.   Neurological:  Negative for dizziness, tremors, seizures, weakness, light-headedness and numbness.   Hematological:  Negative for adenopathy. Does not bruise/bleed easily.   Psychiatric/Behavioral:  Positive for dysphoric mood. Negative for behavioral problems and confusion. The patient is not nervous/anxious.          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    Took famotidine for years    Compression fracture of body of thoracic vertebra (HCC) 02/2024    T12, L5, L1    Marleni Marcelo virus infection 03/09/2024    GERD (gastroesophageal reflux disease) 3/29/2024    Kidney stone     Memory loss 3/29/2024    Osteoporosis          Current Outpatient Medications:     Calcium Carb-Cholecalciferol (Calcium 1000 + D) 1000-20 MG-MCG TABS, Take 1 tablet by mouth 2 (two) times a day, Disp: , Rfl:     cycloSPORINE (Restasis) 0.05 % ophthalmic emulsion, Administer 1 drop to both eyes every 12 (twelve) hours, Disp: , Rfl:     Fide 500 MG CAPS, Take by mouth 500 mg daily, Disp: , Rfl:     MAGNESIUM CARBONATE PO, Take 1 tablet by mouth  "daily as needed, Disp: , Rfl:     Magnesium Citrate (CITROMA PO), if needed As directed, Disp: , Rfl:     Multiple Vitamin (Multi Vitamin Daily) TABS, Take by mouth, Disp: , Rfl:     NON FORMULARY, Triple magnesium (glycinate 10%, oxide 60%, malate 30%,), Disp: , Rfl:     Omega-3 Fatty Acids (fish oil) 1,000 mg, Take 2,000 mg by mouth daily, Disp: , Rfl:     patient supplied medication, Take 1 each by mouth daily One probiotic daily, Disp: , Rfl:     Pyridoxine HCl (Vitamin B6) 100 MG TABS, in the morning, Disp: , Rfl:     risedronate (ACTONEL) 35 mg tablet, Take 1 tablet (35 mg total) by mouth every 7 days with water on empty stomach, nothing by mouth or lie down for next 30 minutes., Disp: 12 tablet, Rfl: 1    tamsulosin (FLOMAX) 0.4 mg, Take 1 capsule (0.4 mg total) by mouth daily with dinner, Disp: 90 capsule, Rfl: 3    No Known Allergies    Social History   Past Surgical History:   Procedure Laterality Date    COLONOSCOPY      Within last 5 years    FL RETROGRADE PYELOGRAM  3/29/2024    HERNIA REPAIR      IR KYPHOPLASTY/VERTEBROPLASTY  4/23/2024    KNEE SURGERY      [place] CYSTO/URETERO W/LITHOTRIPSY &INDWELL STENT INSRT Right 3/29/2024    Procedure: CYSTO USCOPE W/  LASER, &  STENT;  Surgeon: Salvatore Celis MD;  Location: [facility] OR;  Service: Urology    UPPER GASTROINTESTINAL ENDOSCOPY       Family History   Problem Relation Age of Onset    Brain cancer Mother     Cancer Mother     Aneurysm Father        Objective:  /78 (BP Location: Left arm, Patient Position: Sitting, Cuff Size: Standard)   Pulse 59   Temp 98.6 °F (37 °C) (Temporal)   Ht 5' 8\" (1.727 m)   Wt 68.4 kg (150 lb 12.8 oz)   SpO2 99%   BMI 22.93 kg/m²        Physical Exam  Constitutional:       Appearance: He is well-developed.   HENT:      Right Ear: Tympanic membrane and external ear normal.      Left Ear: Tympanic membrane normal.   Eyes:      Conjunctiva/sclera: Conjunctivae normal.      Pupils: Pupils are equal, round, and " reactive to light.   Neck:      Thyroid: No thyromegaly.      Vascular: No JVD.   Cardiovascular:      Rate and Rhythm: Normal rate and regular rhythm.      Heart sounds: Normal heart sounds.   Pulmonary:      Breath sounds: Normal breath sounds.   Abdominal:      General: Bowel sounds are normal.      Palpations: Abdomen is soft.   Musculoskeletal:         General: Normal range of motion.      Cervical back: Normal range of motion.   Lymphadenopathy:      Cervical: No cervical adenopathy.   Skin:     General: Skin is dry.   Neurological:      General: No focal deficit present.      Mental Status: He is alert and oriented to person, place, and time. Mental status is at baseline.      Deep Tendon Reflexes: Reflexes are normal and symmetric.   Psychiatric:         Mood and Affect: Mood normal.         Behavior: Behavior normal.

## 2024-05-02 ENCOUNTER — TELEPHONE (OUTPATIENT)
Dept: NEUROSURGERY | Facility: CLINIC | Age: 74
End: 2024-05-02

## 2024-05-02 NOTE — TELEPHONE ENCOUNTER
Received a call from patient's spouse Alicia questioning if patient is able to submerge in a hot tub to help relieve his back pain.     Returned call to Alicia and advised he should avoid soaking for about 4 weeks post surgery. Suggested use of hot/cold packs, tylenol and ibuprofen.     She stated an understanding and was appreciative of the call back.

## 2024-05-11 ENCOUNTER — APPOINTMENT (OUTPATIENT)
Dept: LAB | Age: 74
End: 2024-05-11
Payer: MEDICARE

## 2024-05-11 DIAGNOSIS — M80.00XD AGE-RELATED OSTEOPOROSIS WITH CURRENT PATHOLOGICAL FRACTURE WITH ROUTINE HEALING, SUBSEQUENT ENCOUNTER: ICD-10-CM

## 2024-05-11 PROCEDURE — 84166 PROTEIN E-PHORESIS/URINE/CSF: CPT

## 2024-05-13 ENCOUNTER — TELEPHONE (OUTPATIENT)
Dept: GASTROENTEROLOGY | Facility: CLINIC | Age: 74
End: 2024-05-13

## 2024-05-13 ENCOUNTER — OFFICE VISIT (OUTPATIENT)
Dept: NEUROSURGERY | Facility: CLINIC | Age: 74
End: 2024-05-13
Payer: MEDICARE

## 2024-05-13 VITALS
SYSTOLIC BLOOD PRESSURE: 118 MMHG | WEIGHT: 152.03 LBS | HEIGHT: 68 IN | HEART RATE: 60 BPM | BODY MASS INDEX: 23.04 KG/M2 | RESPIRATION RATE: 20 BRPM | TEMPERATURE: 98.9 F | OXYGEN SATURATION: 96 % | DIASTOLIC BLOOD PRESSURE: 64 MMHG

## 2024-05-13 DIAGNOSIS — S22.000A COMPRESSION FRACTURE OF BODY OF THORACIC VERTEBRA (HCC): Primary | ICD-10-CM

## 2024-05-13 DIAGNOSIS — S32.050A COMPRESSION FRACTURE OF FIFTH LUMBAR VERTEBRA (HCC): ICD-10-CM

## 2024-05-13 PROCEDURE — 99213 OFFICE O/P EST LOW 20 MIN: CPT | Performed by: RADIOLOGY

## 2024-05-13 NOTE — TELEPHONE ENCOUNTER
Spoke with patient, she says she will call back and schedule with dr morse next available appt. Appt in June was cancel due to provider schedule change. Give patient our call back number

## 2024-05-13 NOTE — PROGRESS NOTES
"Assessment/Plan:     Diagnoses and all orders for this visit:    Compression fracture of body of thoracic vertebra (HCC)  -     MRI thoracic spine without contrast; Future  -     MRI lumbar spine without contrast; Future    Compression fracture of fifth lumbar vertebra (HCC)  -     MRI thoracic spine without contrast; Future  -     MRI lumbar spine without contrast; Future    Other orders  -     denosumab (PROLIA) 60 mg/mL; Inject under the skin once          Discussion Summary:   Jose peterson continues to have severe pain. Given that he had initial relief then it returned, I suspect the possibility of a new fracture. I have ordered him new MRI's of the thoracic and lumbar spine to evaluate further. He will return once completed.         Chief Complaint: Follow-up      Patient ID: Jose Raza is a 73 y.o. male    HPI  Mr. Raza returns for follow-up of his compression fractures. He is status post T12 and L5 kyphoplasty. No incisional redness or swelling. No fevers. Reports his back is \"brutal\" since the procedure. Had a few days after procedure where was feeling very well but then pain returned. Believes its the same pain. No new radiating leg pain, numbness or weakness.     Review of Systems   Constitutional: Negative.    HENT: Negative.     Eyes: Negative.    Respiratory: Negative.     Cardiovascular: Negative.    Gastrointestinal: Negative.    Endocrine: Negative.    Genitourinary: Negative.    Musculoskeletal:  Positive for back pain (lower back pain radiates to flank on both sides and to top of buttocks) and gait problem (cant stand too long or walk long distances very painful at times). Negative for myalgias.   Neurological:  Negative for weakness and numbness.   Hematological:  Bruises/bleeds easily (omega 3  fisho oil).   Psychiatric/Behavioral:  Positive for sleep disturbance (interrupted due to pain at times).        I have personally reviewed the MA's review of systems and made changes as " necessary.    The following portions of the patient's history were reviewed and updated as appropriate: allergies, current medications, past family history, past medical history, past social history, past surgical history, and problem list.      Active Ambulatory Problems     Diagnosis Date Noted    Cognitive impairment 08/23/2023    Chronic fatigue syndrome 08/23/2023    Nail abnormality 08/23/2023    Iron deficiency anemia 08/23/2023    Acute left-sided low back pain without sciatica 01/22/2024    Nephrolithiasis 02/05/2024    Other constipation 02/05/2024    Compression fracture of body of thoracic vertebra (HCC) 02/19/2024    Compression fracture of fifth lumbar vertebra (HCC) 02/19/2024    Age-related osteoporosis with current pathological fracture 02/23/2024    Tsai esophagus 03/29/2024    GERD (gastroesophageal reflux disease) 03/29/2024    Memory loss 03/29/2024     Resolved Ambulatory Problems     Diagnosis Date Noted    Urinary retention 02/05/2024     Past Medical History:   Diagnosis Date    Marleni Barr virus infection 03/09/2024    Kidney stone     Osteoporosis        Past Surgical History:   Procedure Laterality Date    COLONOSCOPY      Within last 5 years    FL RETROGRADE PYELOGRAM  3/29/2024    HERNIA REPAIR      IR KYPHOPLASTY/VERTEBROPLASTY  4/23/2024    KNEE SURGERY      [place] CYSTO/URETERO W/LITHOTRIPSY &INDWELL STENT INSRT Right 3/29/2024    Procedure: CYSTO USCOPE W/  LASER, &  STENT;  Surgeon: Salvatore Celis MD;  Location: [facility] OR;  Service: Urology    UPPER GASTROINTESTINAL ENDOSCOPY         Current Outpatient Medications   Medication Sig Dispense Refill    Calcium Carb-Cholecalciferol (Calcium 1000 + D) 1000-20 MG-MCG TABS Take 1 tablet by mouth 2 (two) times a day      cycloSPORINE (Restasis) 0.05 % ophthalmic emulsion Administer 1 drop to both eyes every 12 (twelve) hours      denosumab (PROLIA) 60 mg/mL Inject under the skin once      Fide 500 MG CAPS Take by mouth 500 mg daily       MAGNESIUM CARBONATE PO Take 1 tablet by mouth daily as needed      Magnesium Citrate (CITROMA PO) if needed As directed      Multiple Vitamin (Multi Vitamin Daily) TABS Take by mouth      NON FORMULARY Triple magnesium (glycinate 10%, oxide 60%, malate 30%,)      Omega-3 Fatty Acids (fish oil) 1,000 mg Take 2,000 mg by mouth daily      patient supplied medication Take 1 each by mouth daily One probiotic daily      Pyridoxine HCl (Vitamin B6) 100 MG TABS in the morning      tamsulosin (FLOMAX) 0.4 mg Take 1 capsule (0.4 mg total) by mouth daily with dinner 90 capsule 3     No current facility-administered medications for this visit.       Vitals:    05/13/24 1443   Resp: 20         Objective:    Physical Exam  Neurologic Exam    Results/Data:  I have reviewed the results and images from the kyphoplasty in detail with the patient.

## 2024-05-15 LAB
ALBUMIN UR ELPH-MCNC: 100 %
ALPHA1 GLOB MFR UR ELPH: 0 %
ALPHA2 GLOB MFR UR ELPH: 0 %
B-GLOBULIN MFR UR ELPH: 0 %
GAMMA GLOB MFR UR ELPH: 0 %
PROT PATTERN UR ELPH-IMP: NORMAL
PROT UR-MCNC: 10.4 MG/DL

## 2024-05-15 PROCEDURE — 84166 PROTEIN E-PHORESIS/URINE/CSF: CPT | Performed by: STUDENT IN AN ORGANIZED HEALTH CARE EDUCATION/TRAINING PROGRAM

## 2024-05-20 ENCOUNTER — OFFICE VISIT (OUTPATIENT)
Dept: INTERNAL MEDICINE CLINIC | Facility: CLINIC | Age: 74
End: 2024-05-20
Payer: MEDICARE

## 2024-05-20 ENCOUNTER — APPOINTMENT (OUTPATIENT)
Dept: LAB | Age: 74
End: 2024-05-20
Payer: MEDICARE

## 2024-05-20 ENCOUNTER — APPOINTMENT (OUTPATIENT)
Dept: LAB | Facility: CLINIC | Age: 74
End: 2024-05-20
Payer: MEDICARE

## 2024-05-20 VITALS
SYSTOLIC BLOOD PRESSURE: 130 MMHG | WEIGHT: 152 LBS | DIASTOLIC BLOOD PRESSURE: 70 MMHG | HEART RATE: 77 BPM | OXYGEN SATURATION: 95 % | BODY MASS INDEX: 23.11 KG/M2

## 2024-05-20 DIAGNOSIS — M80.00XD AGE-RELATED OSTEOPOROSIS WITH CURRENT PATHOLOGICAL FRACTURE WITH ROUTINE HEALING, SUBSEQUENT ENCOUNTER: ICD-10-CM

## 2024-05-20 DIAGNOSIS — Z76.89 ENCOUNTER TO ESTABLISH CARE: Primary | ICD-10-CM

## 2024-05-20 DIAGNOSIS — N20.0 NEPHROLITHIASIS: ICD-10-CM

## 2024-05-20 DIAGNOSIS — K63.8219 SMALL INTESTINAL BACTERIAL OVERGROWTH (SIBO): ICD-10-CM

## 2024-05-20 DIAGNOSIS — K21.9 GASTROESOPHAGEAL REFLUX DISEASE, UNSPECIFIED WHETHER ESOPHAGITIS PRESENT: ICD-10-CM

## 2024-05-20 DIAGNOSIS — R41.89 COGNITIVE IMPAIRMENT: ICD-10-CM

## 2024-05-20 PROCEDURE — 83735 ASSAY OF MAGNESIUM: CPT

## 2024-05-20 PROCEDURE — 81003 URINALYSIS AUTO W/O SCOPE: CPT

## 2024-05-20 PROCEDURE — 84133 ASSAY OF URINE POTASSIUM: CPT

## 2024-05-20 PROCEDURE — 82140 ASSAY OF AMMONIA: CPT

## 2024-05-20 PROCEDURE — 82131 AMINO ACIDS SINGLE QUANT: CPT

## 2024-05-20 PROCEDURE — 82436 ASSAY OF URINE CHLORIDE: CPT

## 2024-05-20 PROCEDURE — 84392 ASSAY OF URINE SULFATE: CPT

## 2024-05-20 PROCEDURE — 84105 ASSAY OF URINE PHOSPHORUS: CPT

## 2024-05-20 PROCEDURE — 83935 ASSAY OF URINE OSMOLALITY: CPT

## 2024-05-20 PROCEDURE — G2211 COMPLEX E/M VISIT ADD ON: HCPCS | Performed by: INTERNAL MEDICINE

## 2024-05-20 PROCEDURE — 82570 ASSAY OF URINE CREATININE: CPT

## 2024-05-20 PROCEDURE — 84560 ASSAY OF URINE/URIC ACID: CPT

## 2024-05-20 PROCEDURE — 82653 EL-1 FECAL QUANTITATIVE: CPT

## 2024-05-20 PROCEDURE — 36415 COLL VENOUS BLD VENIPUNCTURE: CPT

## 2024-05-20 PROCEDURE — 82340 ASSAY OF CALCIUM IN URINE: CPT

## 2024-05-20 PROCEDURE — 83993 ASSAY FOR CALPROTECTIN FECAL: CPT

## 2024-05-20 PROCEDURE — 84300 ASSAY OF URINE SODIUM: CPT

## 2024-05-20 PROCEDURE — 99214 OFFICE O/P EST MOD 30 MIN: CPT | Performed by: INTERNAL MEDICINE

## 2024-05-20 PROCEDURE — 82103 ALPHA-1-ANTITRYPSIN TOTAL: CPT

## 2024-05-20 PROCEDURE — 82507 ASSAY OF CITRATE: CPT

## 2024-05-20 PROCEDURE — 83945 ASSAY OF OXALATE: CPT

## 2024-05-20 RX ORDER — LIDOCAINE 50 MG/G
1 PATCH TOPICAL DAILY
Qty: 14 PATCH | Refills: 0 | Status: SHIPPED | OUTPATIENT
Start: 2024-05-20

## 2024-05-20 NOTE — PATIENT INSTRUCTIONS
-For pain you may take Tylenol 1000 mg 3 times a day as needed.  -A prescription for Lidoderm 5% patches will be sent to your pharmacy.  If for some reason this is not covered by insurance 4% patches are available over-the-counter.  -We can also consider a possible trial of calcitonin nasal spray.

## 2024-05-20 NOTE — PROGRESS NOTES
Name: Jose Raza      : 1950      MRN: [id number]  Encounter Provider: Angel Simon MD  Encounter Date: 2024   [facility]    Assessment & Plan     1. Encounter to establish care  2. Age-related osteoporosis with current pathological fracture with routine healing, subsequent encounter  Assessment & Plan:  -Status post kyphoplasty  -Still experiencing significant pain.  Orthospine has ordered an MRI of the lumbar and thoracic spine for further evaluation.  -Recommended he increase his Tylenol to 1000 mg 3 times daily as needed.  He has also been given a prescription for lidocaine patches.  -Consider a trial of intranasal calcitonin  Orders:  -     lidocaine (Lidoderm) 5 %; Apply 1 patch topically over 12 hours daily Remove & Discard patch within 12 hours or as directed by MD  3. Gastroesophageal reflux disease, unspecified whether esophagitis present  Assessment & Plan:  -History of Tsai esophagus   -Followed by [facility]     4. Cognitive impairment  Assessment & Plan:  -TSH and B12 levels were normal.  -[facility]  They have referred the patient for further evaluation with a neuropsychologist.         Subjective     HPI  Patient presents today to reestablish care.  His past medical history is significant for osteoporosis with spinal compression fracture, cognitive impairment, GERD with history of Tsai's esophagus and nephrolithiasis.    The patient is accompanied today by his wife who helps to provide some of the medical history.  She reports he underwent a kyphoplasty x 2 in 2023 for spinal compression fractures.  She states they were only able to address 2 of the fractures with the surgery.  He states despite the procedure he continues to have significant pain.  He was recently seen by his spine specialist who recommended obtaining an MRI of the lumbar and thoracic spine to make sure that he has not developed  additional fractures.    Regarding his cognitive impairment he was recently seen by neurology at [facility].  His recent thyroid function testing was found to be normal.  B12 level was also checked to assess for reversible causes of dementia and was normal as well.  He was referred to neuropsychology for additional testing.    The patient's wife reports he is also recently undergone surgery in 2023 for hernia in addition to laser treatment and stenting for renal stone back in March.    All other systems negative except for pertinent findings noted in HPI.       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 famotidine for years   • Compression fracture of body of thoracic vertebra (HCC) 2024    T12, L5, L1   • Marleni Marcelo virus infection 2024   • GERD (gastroesophageal reflux disease) 3/29/2024   • Kidney stone    • Memory loss 3/29/2024   • Osteoporosis      Past Surgical History:   Procedure Laterality Date   • COLONOSCOPY      Within last 5 years   • FL RETROGRADE PYELOGRAM  3/29/2024   • HERNIA REPAIR     • IR KYPHOPLASTY/VERTEBROPLASTY  2024   • KNEE SURGERY     • [place] CYSTO/URETERO W/LITHOTRIPSY &INDWELL STENT INSRT Right 3/29/2024    Procedure: CYSTO USCOPE W/  LASER, &  STENT;  Surgeon: Salvatore Celis MD;  Location: [facility];  Service: Urology   • UPPER GASTROINTESTINAL ENDOSCOPY       Family History   Problem Relation Age of Onset   • Brain cancer Mother    • Cancer Mother    • Aneurysm Father      Social History     Socioeconomic History   • Marital status: /Civil Union     Spouse name: None   • Number of children: None   • Years of education: None   • Highest education level: None   Occupational History   • None   Tobacco Use   • Smoking status: Former     Current packs/day: 0.00     Types: Cigarettes     Start date:      Quit date: 1975     Years since quittin.4     Passive exposure: Never   • Smokeless tobacco: Never   • Tobacco  comments:     Causal smoker as a young man 51 years ago   Vaping Use   • Vaping status: Never Used   Substance and Sexual Activity   • Alcohol use: Not Currently   • Drug use: Never   • Sexual activity: Yes     Partners: Female   Other Topics Concern   • None   Social History Narrative   • None     Social Determinants of Health     Financial Resource Strain: Low Risk  (6/13/2023)    [facility]    Overall Financial Resource Strain (CARDIA)    • Difficulty of Paying Living Expenses: Not hard at all   Food Insecurity: No Food Insecurity (3/25/2024)    Hunger Vital Sign    • Worried About Running Out of Food in the Last Year: Never true    • Ran Out of Food in the Last Year: Never true   Transportation Needs: No Transportation Needs (3/25/2024)    PRAPARE - Transportation    • Lack of Transportation (Medical): No    • Lack of Transportation (Non-Medical): No   Physical Activity: Not on file   Stress: No Stress Concern Present (6/13/2023)    [facility]    [employer] [place] [employer] - Occupational Stress Questionnaire    • Feeling of Stress : Only a little   Social Connections: Socially Integrated (6/13/2023)    [facility]    Social Connection and Isolation Panel [NHANES]    • Frequency of Communication with Friends and Family: More than three times a week    • Frequency of Social Gatherings with Friends and Family: More than three times a week    • Attends Jain Services: More than 4 times per year    • Active Member of Clubs or Organizations: Yes    • Attends Club or Organization Meetings: Never    • Marital Status:    Intimate Partner Violence: Not At Risk (6/13/2023)    [facility]    Humiliation, Afraid, Rape, and Kick questionnaire    • Fear of  Current or Ex-Partner: No    • Emotionally Abused: No    • Physically Abused: No    • Sexually Abused: No   Housing Stability: Low Risk  (3/25/2024)    Housing Stability Vital Sign    • Unable to Pay for Housing in the Last Year: No    • Number of Places Lived in the Last Year: 1    • Unstable Housing in the Last Year: No     Current Outpatient Medications on File Prior to Visit   Medication Sig   • Calcium Carb-Cholecalciferol (Calcium 1000 + D) 1000-20 MG-MCG TABS Take 1 tablet by mouth 2 (two) times a day   • cycloSPORINE (Restasis) 0.05 % ophthalmic emulsion Administer 1 drop to both eyes every 12 (twelve) hours   • denosumab (PROLIA) 60 mg/mL Inject under the skin once   • Ginger 500 MG CAPS Take by mouth 500 mg daily   • MAGNESIUM CARBONATE PO Take 1 tablet by mouth daily as needed   • Magnesium Citrate (CITROMA PO) if needed As directed   • Multiple Vitamin (Multi Vitamin Daily) TABS Take by mouth   • NON FORMULARY Triple magnesium (glycinate 10%, oxide 60%, malate 30%,)   • Omega-3 Fatty Acids (fish oil) 1,000 mg Take 2,000 mg by mouth daily   • patient supplied medication Take 1 each by mouth daily One probiotic daily   • Pyridoxine HCl (Vitamin B6) 100 MG TABS in the morning   • tamsulosin (FLOMAX) 0.4 mg Take 1 capsule (0.4 mg total) by mouth daily with dinner   • vitamin k 100 MCG tablet Take 100 mcg by mouth daily (Patient not taking: Reported on 5/20/2024)     No Known Allergies  Immunization History   Administered Date(s) Administered   • COVID-19 MODERNA VACC 0.5 ML IM 01/22/2021, 02/26/2021   • COVID-19, unspecified 01/22/2021, 02/26/2021   • INFLUENZA 11/12/2017, 11/01/2018, 11/08/2020, 11/07/2021   • Pneumococcal 02/26/2019   • Pneumococcal Conjugate 13-Valent 02/26/2019   • Pneumococcal Polysaccharide PPV23 11/15/2020   • Tdap 04/04/2007, 08/22/2018   • Tetanus Toxoid, Unspecified 08/22/2018   • Zoster 11/15/2020   • Zoster Vaccine Recombinant 11/13/2020       Objective     /70 (BP  Location: Left arm, Patient Position: Sitting, Cuff Size: Standard)   Pulse 77   Wt 68.9 kg (152 lb)   SpO2 95%   BMI 23.11 kg/m²     BP Readings from Last 3 Encounters:   05/20/24 130/70   05/13/24 118/64   04/29/24 114/78        Wt Readings from Last 3 Encounters:   05/20/24 68.9 kg (152 lb)   05/13/24 69 kg (152 lb 0.5 oz)   04/29/24 68.4 kg (150 lb 12.8 oz)        Physical Exam    General: NAD  HEENT: NCAT, EOMI, normal conjunctiva  Cardiovascular: RRR, normal S1 and S2, no m/r/g  Pulmonary: Normal respiratory effort, no wheezes, rales or rhonchi  GI: Soft, nontender, nondistended, normoactive bowel sounds  MSK: Normal bulk and tone, tenderness to palpation over left and right quadratus lumborum  Neuro: Non-focal, ambulating without difficulty, non-antalgic gait  Extremities: No lower extremity edema  Skin: Normal skin color, no rashes     Angel Simon MD

## 2024-05-20 NOTE — ASSESSMENT & PLAN NOTE
-TSH and B12 levels were normal.  -[facility].  They have referred the patient for further evaluation with a neuropsychologist.

## 2024-05-20 NOTE — ASSESSMENT & PLAN NOTE
-Status post kyphoplasty  -Still experiencing significant pain.  Orthospine has ordered an MRI of the lumbar and thoracic spine for further evaluation.  -Recommended he increase his Tylenol to 1000 mg 3 times daily as needed.  He has also been given a prescription for lidocaine patches.  -Consider a trial of intranasal calcitonin

## 2024-05-23 LAB — ELASTASE PANC STL-MCNT: 93 UG ELAST./G

## 2024-05-24 LAB
AMMONIA UR-SCNC: 11 MMOL/24 HR (ref 15–60)
CA H2 PHOS DIHYD 24H SATFR UR: 1.2 (ref 0.5–2)
CALCIUM 24H UR-MRATE: 167 MG/24 HR
CALCIUM/CREAT UR: 168 MG/G CREAT (ref 34–196)
CALCIUM/KG BODY WEIGHT: ABNORMAL MG/24 HR/KG
CAOX INDEX 24H UR-RTO: 10.67 (ref 6–10)
CHLORIDE 24H UR-SRATE: 90 MMOL/24 HR (ref 70–250)
CITRATE 24H UR-MCNC: 1120 MG/24 HR
COMMENT: ABNORMAL
CREAT UR-MCNC: 994 MG/24 HR
CREAT/BW 24H UR-RELMRAT: ABNORMAL MG/24 HR/KG
CYSTINE 24H UR-MCNC: ABNORMAL MG/DL
MAGNESIUM 24H UR-MRATE: 262 MG/24 HR (ref 30–120)
MISCELLANEOUS LAB TEST RESULT: NORMAL
OXALATE UR-MCNC: 97 MG/24 HR (ref 20–40)
PH 24H UR: 7.42 [PH] (ref 5.8–6.2)
PHOSPHATE 24H UR-MRATE: 411 MG/24 HR (ref 600–1200)
POTASSIUM 24H UR-SCNC: 107 MMOL/24 HR (ref 20–100)
PROTEIN CATABOLIC RATE 24H UR-MRATE: ABNORMAL G/KG/24 HR
SODIUM 24H UR-SRATE: 92 MMOL/24 HR (ref 50–150)
SPECIMEN VOL 24H UR: 2070 ML/24 HR (ref 500–4000)
SULFATE 24H UR-SRATE: 30 MEQ/24 HR (ref 20–80)
URATE 24H SATFR UR: 0.03
URATE 24H UR-MRATE: 621 MG/24 HR
UUN 24H UR-MRATE: 5.27 G/24 HR (ref 6–14)

## 2024-05-25 ENCOUNTER — HOSPITAL ENCOUNTER (OUTPATIENT)
Dept: RADIOLOGY | Facility: HOSPITAL | Age: 74
Discharge: HOME | End: 2024-05-25
Attending: RADIOLOGY
Payer: MEDICARE

## 2024-05-25 DIAGNOSIS — S32.050A COMPRESSION FRACTURE OF FIFTH LUMBAR VERTEBRA (HCC): ICD-10-CM

## 2024-05-25 DIAGNOSIS — S22.000A COMPRESSION FRACTURE OF BODY OF THORACIC VERTEBRA (HCC): ICD-10-CM

## 2024-05-25 PROCEDURE — 72148 MRI LUMBAR SPINE W/O DYE: CPT

## 2024-05-25 PROCEDURE — 72146 MRI CHEST SPINE W/O DYE: CPT

## 2024-05-26 LAB — CALPROTECTIN STL-MCNT: 14 UG/G (ref 0–120)

## 2024-05-28 ENCOUNTER — TELEPHONE (OUTPATIENT)
Age: 74
End: 2024-05-28

## 2024-05-28 NOTE — TELEPHONE ENCOUNTER
PA for lidocaine (Lidoderm) 5 %    Submitted via    []CMM-KEY   [x][employer]-Case ID # [id number]  []Faxed to plan   []Other website   []Phone call Case ID #     Office notes sent, clinical questions answered. Awaiting determination    Turnaround time for your insurance to make a decision on your Prior Authorization can take 7-21 business days.

## 2024-05-28 NOTE — TELEPHONE ENCOUNTER
PA for lidocaine (Lidoderm) 5 % Denied    Reason:       Message sent to office clinical pool Yes    Denial letter scanned into Media Yes    Appeal started No     **Please follow up with your patient regarding denial and next steps**

## 2024-05-31 ENCOUNTER — NURSE TRIAGE (OUTPATIENT)
Age: 74
End: 2024-05-31

## 2024-05-31 NOTE — TELEPHONE ENCOUNTER
"  Answer Assessment - Initial Assessment Questions  1. REASON FOR CALL or QUESTION: \"What is your reason for calling today?\" or \"How can I best help you?\" or \"What question do you have that I can help answer?\"          Patient and spouse calling in requesting   Dr. Celis advise on urine results.    They are aware that he is in [place] and they typically go to [place] but Dr. Celis did patients surgery and ordered the test and they are looking for his insight.    Please advise on results     #[phone]    Protocols used: Information Only Call - No Triage-ADULT-[place]    "

## 2024-06-03 NOTE — TELEPHONE ENCOUNTER
Please call Jose and let him know that his upcoming appointment on 7/12/2024 is to review the results of his stone study and stone prevention options.  However, if the patient would rather review these results with Dr. Celis then I would advise the patient to reschedule to get on Dr. Celis schedule to review these results.

## 2024-06-03 NOTE — TELEPHONE ENCOUNTER
Call placed to patient and spoke with his wife. Informed her of the AP recommendations and appointment. They wish to keep July's appointment and will discuss with AP team next steps.

## 2024-06-07 ENCOUNTER — OFFICE VISIT (OUTPATIENT)
Dept: NEUROSURGERY | Facility: CLINIC | Age: 74
End: 2024-06-07
Payer: MEDICARE

## 2024-06-07 VITALS
HEART RATE: 60 BPM | RESPIRATION RATE: 18 BRPM | HEIGHT: 68 IN | SYSTOLIC BLOOD PRESSURE: 116 MMHG | BODY MASS INDEX: 23.04 KG/M2 | DIASTOLIC BLOOD PRESSURE: 68 MMHG | TEMPERATURE: 98.1 F | OXYGEN SATURATION: 99 % | WEIGHT: 152 LBS

## 2024-06-07 DIAGNOSIS — Z98.890 STATUS POST KYPHOPLASTY: ICD-10-CM

## 2024-06-07 DIAGNOSIS — S32.010A COMPRESSION FRACTURE OF L1 LUMBAR VERTEBRA, CLOSED, INITIAL ENCOUNTER (HCC): Primary | ICD-10-CM

## 2024-06-07 DIAGNOSIS — S22.000A COMPRESSION FRACTURE OF BODY OF THORACIC VERTEBRA (HCC): ICD-10-CM

## 2024-06-07 DIAGNOSIS — S32.050D COMPRESSION FRACTURE OF L5 VERTEBRA WITH ROUTINE HEALING, SUBSEQUENT ENCOUNTER: ICD-10-CM

## 2024-06-07 DIAGNOSIS — M80.00XA AGE-RELATED OSTEOPOROSIS WITH CURRENT PATHOLOGICAL FRACTURE, INITIAL ENCOUNTER: ICD-10-CM

## 2024-06-07 PROCEDURE — 99215 OFFICE O/P EST HI 40 MIN: CPT | Performed by: NURSE PRACTITIONER

## 2024-06-07 NOTE — PROGRESS NOTES
Assessment/Plan:    Age-related osteoporosis with current pathological fracture  As addressed in HPI  Patient is very quiet during the visit rarely ever saying anything,wife doing all the talking  Continues with complaints of back pain since last visit a severity of 7/10.  He reports pain level today in office is 2/10.  Wife reports patient is intermittently wearing the back brace.  He is concerned he has not been able to exercise at his previous level using the treadmill and elliptical.  Wife expresses concern that patient has not returned to his prior functional level as before the compression fracture.  Wife is questioning when the patient can start physical therapy.  Patient has a diagnosis of osteoporosis and has started Prolia initially ordered by rheumatology but has transferred care to endocrinology, Dr. Gonzalez at [facility].  Nonfocal examination, he denies radicular symptoms, bowel or bladder dysfunction, or motor deficits or gait instability.      Imaging  5/25/2024 MRI lumbar spine--Recent injury superimposed on subacute to chronic L1 fracture with progression of height loss, diffuse marrow edema and mild retropulsion compared to the prior outside MRI examination of 3/1/2024. Chronic T12 and L5 compression fractures status post kyphoplasty. Mild multilevel lumbar degenerative disc disease as detailed without significant central canal narrowing. Persistent moderate to severe bilateral L5-S1 neural foraminal narrowing with encroachment of the exiting L5 nerve roots. The study was marked in EPIC for immediate notification.   5/25/2024 MRI Thoracic w/o contrast Recent injury superimposed on subacute to chronic L1 fracture with progression of height loss, diffuse marrow edema and mild retropulsion compared to the prior outside MRI examination of 3/1/2024.chronic T12 and L5 compression fractures status post kyphoplasty. Mild multilevel lumbar degenerative disc disease as detailed without significant central canal  narrowing. Persistent moderate to severe bilateral L5-S1 neural foraminal narrowing with encroachment of the exiting L5 nerve roots. The study was marked in EPIC for immediate notification.      Plan   Reviewed imagining with Patient and wife. Has L1 recent injury   Hold on starting therapy Recent injury superimposed on subacute to chronic L1 fracture with progression of height loss, diffuse marrow edema and mild retropulsion compared to the prior outside MRI examination of 3/1/2024.  Maintain activity restrictions as per protocol--reviewed in great detail.  Cannot start physical therapy yet .  Reviewed red flag signs if they develop patient should report to the closest emergency department for immediate immediate assessment.  Advised if there are additional questions or concerns  To please contact the neurosurgery office.  RTO in 4 weeks with Xray Thoracolumbar spine 2-3 viewed ( due June 22-24 ), appointment 3 days to one week post Xray  Due June 27-29 completion.  AVS osteoporosis , compression ,fracture and care protocol.      Metrics Compression Fracture   RM LBP14/24, ODQ19/50,  EQ5D5L.785/____, Your Health State Today 70%, VAS 5/10      Compression fracture of fifth lumbar vertebra (HCC)  As addressed in HPI.  As addressed and age-related osteoporosis with current pathological fracture.    Compression fracture of body of thoracic vertebra (HCC)  As addressed in HPI.  As addressed and age-related osteoporosis without current pathological fracture.        r.    Subjective: Return to office as a follow-up appointment approximately 6 weeks and 3 days status post kyphoplasty.    Patient ID: Jose Raza is a 73 y.o. male     HPI   Consulted with neurosurgery 3/25/2024 for age-related osteoporosis with current pathological compression fractures.  Compression fractures at T12, L1, and L5.  As per provider note wife reportedduring a snowstorm in January he was outside shoveling snow.  Immediately afterwards he  developed back pain in the lower back. 1-2 weeks later developed urinary retention and underwent CT that revealed multiple compression fractures. Referred to [facility] and placed in a brace 3 weeks ago. Has some mild relief with it on. In the past week, rates his pain at least an 8/10 at its worst. Has been unable to ambulate long distances.  Sleeps in a recliner at baseline. No radiating leg pain or numbness.     He underwent kyphoplasty procedure with Dr. Putnam 4/23/2024 T12 and L5 vertebral body augmentation.  Unable to perform kyphoplasty of L1 due to interval collapse of vertebral body precluding performance of vertebral augmentation.    5/13/2024 3 weeks and 2 days status post kyphoplasty procedure office visit with Dr. Putnam.  Patient reported increased back pain described as brutal since procedure.  He reported as per provider documentation a few days after procedure where he was feeling very well but then pain returned.  He felt the pain was the same as prior kyphoplasty.  He did not have new complaints of radiating leg pain, numbness or weakness.  Incisions were without redness or swelling.  Since he continued to have severe pain ordered MRI of the thoracic and lumbar spine to evaluate further.    Patient returns today for a follow-up visit for imaging review and reassessment.        REVIEW OF SYSTEMS  Review of Systems   Constitutional: Negative.    HENT: Negative.     Eyes: Negative.    Respiratory: Negative.     Cardiovascular: Negative.    Gastrointestinal: Negative.    Genitourinary: Negative.    Musculoskeletal:  Positive for back pain (minor back pain). Negative for gait problem and myalgias.   Skin: Negative.    Allergic/Immunologic: Negative.    Neurological:  Negative for weakness and numbness.   Hematological: Negative.    Psychiatric/Behavioral: Negative.           Meds/Allergies     Current Outpatient Medications   Medication Sig Dispense Refill    Calcium Carb-Cholecalciferol (Calcium 1000 + D)  1000-20 MG-MCG TABS Take 1 tablet by mouth 2 (two) times a day      cycloSPORINE (Restasis) 0.05 % ophthalmic emulsion Administer 1 drop to both eyes every 12 (twelve) hours      denosumab (PROLIA) 60 mg/mL Inject under the skin once      Ginger 500 MG CAPS Take by mouth 500 mg daily      MAGNESIUM CARBONATE PO Take 1 tablet by mouth daily as needed      Magnesium Citrate (CITROMA PO) if needed As directed      Multiple Vitamin (Multi Vitamin Daily) TABS Take by mouth      NON FORMULARY Triple magnesium (glycinate 10%, oxide 60%, malate 30%,)      Omega-3 Fatty Acids (fish oil) 1,000 mg Take 2,000 mg by mouth daily      patient supplied medication Take 1 each by mouth daily One probiotic daily      Pyridoxine HCl (Vitamin B6) 100 MG TABS in the morning      tamsulosin (FLOMAX) 0.4 mg Take 1 capsule (0.4 mg total) by mouth daily with dinner 90 capsule 3    lidocaine (Lidoderm) 5 % Apply 1 patch topically over 12 hours daily Remove & Discard patch within 12 hours or as directed by MD 14 patch 0    vitamin k 100 MCG tablet Take 100 mcg by mouth daily (Patient not taking: Reported on 5/20/2024)       No current facility-administered medications for this visit.       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    GERD (gastroesophageal reflux disease) 3/29/2024    Kidney stone     Memory loss 3/29/2024    Osteoporosis        Past Surgical History:   Procedure Laterality Date    COLONOSCOPY      Within last 5 years    FL RETROGRADE PYELOGRAM  3/29/2024    HERNIA REPAIR      IR KYPHOPLASTY/VERTEBROPLASTY  4/23/2024    KNEE SURGERY      IL CYSTO/URETERO W/LITHOTRIPSY &INDWELL STENT INSRT Right 3/29/2024    Procedure: CYSTO USCOPE W/  LASER, &  STENT;  Surgeon: Salvatore Celis MD;  Location: [facility];   "Service: Urology    UPPER GASTROINTESTINAL ENDOSCOPY         Social History     Tobacco Use    Smoking status: Former     Current packs/day: 0.00     Types: Cigarettes     Start date:      Quit date: 1975     Years since quittin.4     Passive exposure: Never    Smokeless tobacco: Never    Tobacco comments:     Causal smoker as a young man 51 years ago   Vaping Use    Vaping status: Never Used   Substance Use Topics    Alcohol use: Not Currently    Drug use: Never       Family History   Problem Relation Age of Onset    Brain cancer Mother     Cancer Mother     Aneurysm Father        The following portions of the patient's history were reviewed and updated as appropriate: allergies, current medications, past family history, past medical history, past social history, past surgical history and problem list.      EXAM    Vitals:Blood pressure 116/68, pulse 60, temperature 98.1 °F (36.7 °C), temperature source Temporal, resp. rate 18, height 5' 8\" (1.727 m), weight 68.9 kg (152 lb), SpO2 99%.,Body mass index is 23.11 kg/m².     Physical Exam  Vitals and nursing note reviewed. Exam conducted with a chaperone present (wife).   Constitutional:       General: He is not in acute distress.     Appearance: He is not ill-appearing, toxic-appearing or diaphoretic.   HENT:      Head: Normocephalic and atraumatic.   Pulmonary:      Effort: Pulmonary effort is normal. No respiratory distress.   Musculoskeletal:         General: No tenderness or deformity.      Right lower leg: No edema.      Left lower leg: No edema.      Comments: Limitation lumbar spine movement new L 1 compression fracture acute subacute    Neurological:      General: No focal deficit present.      Mental Status: He is alert and oriented to person, place, and time.      Motor: Motor strength is normal.     Gait: Gait is intact.   Psychiatric:      Comments: Flat affect , rarely ever talks.          Neurologic Exam     Mental Status   Oriented to " person, place, and time.   Level of consciousness: alert    Motor Exam   Muscle bulk: decreased  Overall muscle tone: normal    Strength   Strength 5/5 throughout.     Sensory Exam   Light touch normal.     Gait, Coordination, and Reflexes     Gait  Gait: normal      Imaging Studies  MRI lumbar spine without contrast    Result Date: 5/31/2024  Narrative: MRI THORACIC SPINE WITHOUT CONTRAST INDICATION: S22.000A: Wedge compression fracture of unspecified thoracic vertebra, initial encounter for closed fracture S32.050A: Wedge compression fracture of fifth lumbar vertebra, initial encounter for closed fracture. COMPARISON: IR kyphoplasty 4/23/2024. Outside MRI lumbar spine 3/1/2020 TECHNIQUE:  Multiplanar, multisequence imaging of the thoracic and lumbar spines was performed. . IMAGE QUALITY: Diagnostic. FINDINGS: Recent injury superimposed on subacute to chronic L1 fracture with now approximately 70% height loss, progressed, with diffuse marrow edema and mild retropulsion causing approximately 25% canal compromise. Chronic T12 compression fracture status post kyphoplasty 4/23/2024 with stable moderate (approximately 50%) central height loss and no significant retropulsion Chronic L5 compression fracture status post kyphoplasty with stable mild central height loss and no significant retropulsion. SACRUM:  Normal signal within the sacrum. No evidence of insufficiency or stress fracture. THORACIC CORD: Normal signal within the thoracic cord. DISTAL CORD AND CONUS:  Normal size and signal within the distal cord and conus. The conus terminates at the L2 level.  The cauda equina nerve roots appear within normal limits. PARASPINAL SOFT TISSUES: Mild dependent subcutaneous edema within the lower back region. THORACIC DEGENERATIVE CHANGE: No focal disc herniation, central canal stenosis, or neural foraminal narrowing. LUMBAR DISC SPACES: L1-2: No focal disc herniation, central canal stenosis, or neural foraminal narrowing.  L2-3: Stable mild diffuse disc bulge extending into the foraminal regions.  No central canal or  subarticular/lateral recess narrowing. Mild bilateral neural foraminal stenosis. L3-4: Stable mild diffuse disc bulge extending into the foraminal regions.  No central canal or  subarticular/lateral recess narrowing. Mild bilateral neural foraminal stenosis. L4-5: Stable mild diffuse disc bulge.  No central canal or  subarticular/lateral recess narrowing. Mild to moderate bilateral neural foraminal stenosis is again noted. L5-S1: Stable tiny central disc protrusion.  No central canal or  subarticular/lateral recess narrowing. Moderate to severe bilateral neural foraminal stenosis is again noted with encroachment of the exiting L5 nerve roots. OTHER FINDINGS: A moderate size right lower pole renal cyst is noted measuring up to 5 cm in maximal dimension.     Impression: Recent injury superimposed on subacute to chronic L1 fracture with progression of height loss, diffuse marrow edema and mild retropulsion compared to the prior outside MRI examination of 3/1/2024. Chronic T12 and L5 compression fractures status post kyphoplasty. Mild multilevel lumbar degenerative disc disease as detailed without significant central canal narrowing. Persistent moderate to severe bilateral L5-S1 neural foraminal narrowing with encroachment of the exiting L5 nerve roots. The study was marked in EPIC for immediate notification. Workstation performed: [id number]     MRI THORACIC SPINE WITHOUT CONTRAST     INDICATION: S22.000A: Wedge compression fracture of unspecified thoracic vertebra, initial encounter for closed fracture  S32.050A: Wedge compression fracture of fifth lumbar vertebra, initial encounter for closed fracture.     COMPARISON: IR kyphoplasty 4/23/2024. Outside MRI lumbar spine 3/1/2020     TECHNIQUE:  Multiplanar, multisequence imaging of the thoracic and lumbar spines was performed. .     IMAGE QUALITY: Diagnostic.      FINDINGS:     Recent injury superimposed on subacute to chronic L1 fracture with now approximately 70% height loss, progressed, with diffuse marrow edema and mild retropulsion causing approximately 25% canal compromise.     Chronic T12 compression fracture status post kyphoplasty 4/23/2024 with stable moderate (approximately 50%) central height loss and no significant retropulsion     Chronic L5 compression fracture status post kyphoplasty with stable mild central height loss and no significant retropulsion.     SACRUM:  Normal signal within the sacrum. No evidence of insufficiency or stress fracture.     THORACIC CORD: Normal signal within the thoracic cord.     DISTAL CORD AND CONUS:  Normal size and signal within the distal cord and conus. The conus terminates at the L2 level.  The cauda equina nerve roots appear within normal limits.     PARASPINAL SOFT TISSUES: Mild dependent subcutaneous edema within the lower back region.     THORACIC DEGENERATIVE CHANGE: No focal disc herniation, central canal stenosis, or neural foraminal narrowing.     LUMBAR DISC SPACES:     L1-2: No focal disc herniation, central canal stenosis, or neural foraminal narrowing.     L2-3: Stable mild diffuse disc bulge extending into the foraminal regions.  No central canal or  subarticular/lateral recess narrowing. Mild bilateral neural foraminal stenosis.     L3-4: Stable mild diffuse disc bulge extending into the foraminal regions.  No central canal or  subarticular/lateral recess narrowing. Mild bilateral neural foraminal stenosis.     L4-5: Stable mild diffuse disc bulge.  No central canal or  subarticular/lateral recess narrowing. Mild to moderate bilateral neural foraminal stenosis is again noted.     L5-S1: Stable tiny central disc protrusion.  No central canal or  subarticular/lateral recess narrowing. Moderate to severe bilateral neural foraminal stenosis is again noted with encroachment of the exiting L5 nerve roots.     OTHER  FINDINGS: A moderate size right lower pole renal cyst is noted measuring up to 5 cm in maximal dimension.     IMPRESSION:     Recent injury superimposed on subacute to chronic L1 fracture with progression of height loss, diffuse marrow edema and mild retropulsion compared to the prior outside MRI examination of 3/1/2024.     Chronic T12 and L5 compression fractures status post kyphoplasty.     Mild multilevel lumbar degenerative disc disease as detailed without significant central canal narrowing. Persistent moderate to severe bilateral L5-S1 neural foraminal narrowing with encroachment of the exiting L5 nerve roots.     The study was marked in EPIC for immediate notification.    I have personally reviewed pertinent reports.   and I have personally reviewed pertinent films in PACS    I have spent a total time of 40 minutes on 06/07/24 in caring for this patient including Diagnostic results, Risks and benefits of tx options, Instructions for management, Patient and family education, Importance of tx compliance, Risk factor reductions, Impressions, Counseling / Coordination of care, Documenting in the medical record, Reviewing / ordering tests, medicine, procedures  , Obtaining or reviewing history  , and Communicating with other healthcare professionals .     PLEASE NOTE:  This encounter may have been completed utilizing the [employer] Direct Speech Voice Recognition Software. Grammatical errors, random word insertions, pronoun errors and incomplete sentences are occasional consequences of the system due to software limitations, ambient noise and hardware issues.These may be missed even after proof reading prior to affixing electronic signature. Please do not hesitate to contact me directly if you have any questions or concerns about the content, text or information contained within the body of this dictation.

## 2024-06-07 NOTE — ASSESSMENT & PLAN NOTE
As addressed in HPI  Patient is very quiet during the visit rarely ever saying anything,wife doing all the talking  Continues with complaints of back pain since last visit a severity of 7/10.  He reports pain level today in office is 2/10.  Wife reports patient is intermittently wearing the back brace.  He is concerned he has not been able to exercise at his previous level using the treadmill and elliptical.  Wife expresses concern that patient has not returned to his prior functional level as before the compression fracture.  Wife is questioning when the patient can start physical therapy.  Patient has a diagnosis of osteoporosis and has started Prolia initially ordered by rheumatology but has transferred care to endocrinology, Dr. Gonzalez at [facility].  Nonfocal examination, he denies radicular symptoms, bowel or bladder dysfunction, or motor deficits or gait instability.      Imaging  5/25/2024 MRI lumbar spine--Recent injury superimposed on subacute to chronic L1 fracture with progression of height loss, diffuse marrow edema and mild retropulsion compared to the prior outside MRI examination of 3/1/2024. Chronic T12 and L5 compression fractures status post kyphoplasty. Mild multilevel lumbar degenerative disc disease as detailed without significant central canal narrowing. Persistent moderate to severe bilateral L5-S1 neural foraminal narrowing with encroachment of the exiting L5 nerve roots. The study was marked in EPIC for immediate notification.   5/25/2024 MRI Thoracic w/o contrast Recent injury superimposed on subacute to chronic L1 fracture with progression of height loss, diffuse marrow edema and mild retropulsion compared to the prior outside MRI examination of 3/1/2024.chronic T12 and L5 compression fractures status post kyphoplasty. Mild multilevel lumbar degenerative disc disease as detailed without significant central canal narrowing. Persistent moderate to severe bilateral L5-S1 neural foraminal narrowing  with encroachment of the exiting L5 nerve roots. The study was marked in EPIC for immediate notification.      Plan   Reviewed imagining with Patient and wife. Has L1 recent injury   Hold on starting therapy Recent injury superimposed on subacute to chronic L1 fracture with progression of height loss, diffuse marrow edema and mild retropulsion compared to the prior outside MRI examination of 3/1/2024.  Maintain activity restrictions as per protocol--reviewed in great detail.  Cannot start physical therapy yet .  Reviewed red flag signs if they develop patient should report to the closest emergency department for immediate immediate assessment.  Advised if there are additional questions or concerns  To please contact the neurosurgery office.  RTO in 4 weeks with Xray Thoracolumbar spine 2-3 viewed ( due June 22-24 ), appointment 3 days to one week post Xray  Due June 27-29 completion.  AVS osteoporosis , compression ,fracture and care protocol.      Metrics Compression Fracture   RM LBP14/24, ODQ19/50,  EQ5D5L.785/____, Your Health State Today 70%, VAS 5/10

## 2024-06-07 NOTE — Clinical Note
Please call wife , I left her a message RTO in 4 weeks with Xray Thoracolumbar spine 2-3 viewed ( due June 22-24 ), appointment 3 days to one week post Xray, appointment due June 27-29.    Thank You

## 2024-06-07 NOTE — ASSESSMENT & PLAN NOTE
As addressed in HPI.  As addressed and age-related osteoporosis without current pathological fracture.

## 2024-06-07 NOTE — PATIENT INSTRUCTIONS
Instructions for Patient Continued care     Will wear brace for approximately 8-12 weeks, serial x-rays at every visit to assess healing of lumbar fracture, optimal if patient can tolerate.    Use TLSO brace when OOB or HOB > 45 degrees, standing or walking. Can remove brace when I bed     Pain control as per OTC medications use as needed for pain, acetaminophen 500 mg po every 4 hours or 1000 mg (2 tabs) every 8 hours.-   No driving while wearing brace.do not remove to drive until directed by neurosurgery    Thoracic and lumbar spine precautions  and rational to prevent fracture progression no bending at the waist , no lifting greater than 10 LBS  a gallon of milk weighs approximately 10 lbs , do not carry a purse or bag weighing greater than 10 - 20 lbs. No bending, pushing,pulling, trunk twisting, overhead or forward stretching until cleared by neurosurgery    Follow up sooner or go to closest ED for uncontrolled back or leg pain, leg weakness, paresthesia (numbness or tingling worsening for baseline), bowel (incontinence) or bladder (retention) cannot urinate or problems wal ambulatory dysfunction.    Contact neurosurgery with additional questions or concerns.

## 2024-06-07 NOTE — ASSESSMENT & PLAN NOTE
As addressed in HPI.  As addressed and age-related osteoporosis with current pathological fracture.

## 2024-06-08 ENCOUNTER — APPOINTMENT (OUTPATIENT)
Dept: LAB | Age: 74
End: 2024-06-08
Payer: MEDICARE

## 2024-06-08 DIAGNOSIS — M81.8 IDIOPATHIC OSTEOPOROSIS: ICD-10-CM

## 2024-06-08 PROCEDURE — 82523 COLLAGEN CROSSLINKS: CPT

## 2024-06-08 PROCEDURE — 36415 COLL VENOUS BLD VENIPUNCTURE: CPT

## 2024-06-08 PROCEDURE — 84080 ASSAY ALKALINE PHOSPHATASES: CPT

## 2024-06-10 ENCOUNTER — TELEPHONE (OUTPATIENT)
Dept: NEUROSURGERY | Facility: CLINIC | Age: 74
End: 2024-06-10

## 2024-06-10 NOTE — TELEPHONE ENCOUNTER
6/10/24 MICHELE Cooley called spoke to wife and made the follow up with you on 6/27/24.      ===View-only below this line===  ----- Message -----  From: NICOL Galeano  Sent: 6/10/2024   8:14 AM EDT  To: Azucena Davis; Enid Sosa MA  Subject: please schedule f/u appointment                  Please schedule f/u appointment with me solo 3 days -one weeks after June 24 2024.  Patient completing Xray on June 24.    Spoke with wife she is waiting for a call form office to schedule f/u appointment ---compression fracture 4 weeks f/u after imagining  recent injury superimposed on subacute -chronic L1 fracture  70 % height loss w/ retroipulsion.      Thank STACY Cooley

## 2024-06-12 LAB — ALP BONE SERPL-MCNC: 14.9 UG/L (ref 7.6–24.8)

## 2024-06-13 LAB — COLLAGEN CTX SERPL-MCNC: 25 PG/ML

## 2024-06-22 ENCOUNTER — APPOINTMENT (OUTPATIENT)
Dept: RADIOLOGY | Age: 74
End: 2024-06-22
Payer: MEDICARE

## 2024-06-22 DIAGNOSIS — S32.010A COMPRESSION FRACTURE OF L1 LUMBAR VERTEBRA, CLOSED, INITIAL ENCOUNTER (HCC): ICD-10-CM

## 2024-06-22 DIAGNOSIS — M80.00XA AGE-RELATED OSTEOPOROSIS WITH CURRENT PATHOLOGICAL FRACTURE, INITIAL ENCOUNTER: ICD-10-CM

## 2024-06-22 PROCEDURE — 72080 X-RAY EXAM THORACOLMB 2/> VW: CPT

## 2024-06-27 ENCOUNTER — OFFICE VISIT (OUTPATIENT)
Dept: NEUROSURGERY | Facility: CLINIC | Age: 74
End: 2024-06-27
Payer: MEDICARE

## 2024-06-27 VITALS
BODY MASS INDEX: 23.19 KG/M2 | TEMPERATURE: 97.4 F | HEART RATE: 51 BPM | WEIGHT: 153 LBS | SYSTOLIC BLOOD PRESSURE: 116 MMHG | OXYGEN SATURATION: 97 % | RESPIRATION RATE: 16 BRPM | DIASTOLIC BLOOD PRESSURE: 72 MMHG | HEIGHT: 68 IN

## 2024-06-27 DIAGNOSIS — M54.50 ACUTE LEFT-SIDED LOW BACK PAIN WITHOUT SCIATICA: Primary | ICD-10-CM

## 2024-06-27 PROCEDURE — 99214 OFFICE O/P EST MOD 30 MIN: CPT | Performed by: NURSE PRACTITIONER

## 2024-06-27 RX ORDER — PANCRELIPASE 60000; 12000; 38000 [USP'U]/1; [USP'U]/1; [USP'U]/1
CAPSULE, DELAYED RELEASE PELLETS ORAL
COMMUNITY

## 2024-06-27 RX ORDER — LACTO 33/B.LACTIS,LONG/FOS/INU 3.4B-210MG
CAPSULE ORAL DAILY
COMMUNITY
Start: 2024-05-06

## 2024-06-27 RX ORDER — ACETYLCYSTEINE 600 MG
CAPSULE ORAL DAILY
COMMUNITY
Start: 2024-04-26

## 2024-06-27 NOTE — PROGRESS NOTES
Assessment/Plan:    Acute left-sided low back pain without sciatica  As addressed in HPI  Wife admits patient occasionally wears brace, he arrives not wearing the brace same as last visit  Patient continues with complaint of mild to moderate intermittent upper lumbar back pain, is not specific in regards to when this occurred  Wife describes she is on strict activity restriction and does not think.  Nonfocal examination  Denies bowel or bladder dysfunction, saddle anesthesia, or motor deficit  in lower extremities.  Wife questions if patient can start physical therapy  History of osteoporosis, is under the care of endocrine at [facility], and recently started Prolia.      Imaging  6/26/2024 x-ray lumbar spine upright in brace.Stable L1 vertebral plana with mild bony retropulsion. Stable chronic T12 and L5 compression deformity status post vertebroplasty.      Plan  Reviewed imaging with patient and wife.  Reinforced compression fracture management as per protocol, LSO brace, activity restrictions,  Reinforced with wife the natural nature of compression fracture and expected healing time 8 to 12 weeks after diagnosis, explained do not want to start physical therapy prematurely as patient has osteoporosis and is at risk for progression of fracture of L1.  Plan for return to office in 8 weeks without imaging..  Explained to wife and patient during next visit in 8 weeks we will perform flexion and extension in the office, if patient is stable without complaints of back pain or radicular symptoms we will order x-ray lumbar spine flexion and extension 6 views, if imaging is stable we will order physical therapy to start.  Both wife and patient are in agreement with plan.  Reviewed red flag signs advised if patient develops wife is to take him to the closest emergency department for assessment.  Advised if additional questions or concerns to contact the neurosurgery office.      Metrics Compression Fracture   6/29/2024   EBK089, ODQ16/45=35%, EA4P4X569/0.586, [facility] 75/80%,VAS5/10.     3/7/2024 RM/LBP14/24, ODQ19/50,  EQ5D5L..785/40725, [facility] 70%, VAS 5/10           Subjective: Returns to the office for follow-up visit to 6/7/2024 visit for subacute compression fractures superimposed over a chronic L1 compression fracture status post kyphoplasty    Patient ID: Jose Raza is a 73 y.o. male PM/SH:     HPI   Consulted with neurosurgery 3/25/2024 for age-related osteoporosis with current pathological compression fractures.  Compression fractures at T12, L1, and L5.  As per provider note wife reportedduring a snowstorm in January he was outside shoveling snow.  Immediately afterwards he developed back pain in the lower back. 1-2 weeks later developed urinary retention and underwent CT that revealed multiple compression fractures. Referred to [facility] and placed in a brace 3 weeks ago. Has some mild relief with it on. In the past week, rates his pain at least an 8/10 at its worst. Has been unable to ambulate long distances.  Sleeps in a recliner at baseline. No radiating leg pain or numbness.      He underwent kyphoplasty procedure with Dr. Putnam 4/23/2024 T12 and L5 vertebral body augmentation.  Unable to perform kyphoplasty of L1 due to interval collapse of vertebral body precluding performance of vertebral augmentation.     5/13/2024 3 weeks and 2 days status post kyphoplasty procedure office visit with Dr. Putnam.  Patient reported increased back pain described as brutal since procedure.  He reported as per provider documentation a few days after procedure where he was feeling very well but then pain returned.  He felt the pain was the same as prior kyphoplasty.  He did not have new complaints of radiating leg pain, numbness or weakness.  Incisions were without redness or swelling.  Since he continued to have severe pain ordered MRI of the thoracic and lumbar spine to evaluate further.     6/7/2024  patient return to office for a follow-up visit for MRI review thoracic and lumbar spine after patient complaints of the same back pain as he had prior to kyphoplasty procedure.  MRI lumbar spine demonstrated recent injury superimposed on subacute to chronic L1 fracture with progression of height loss, diffuse marrow edema and mild retropulsion compared to prior MRI examination.  He returns today day for reassessment 4 weeks after new diagnosis of a subacute L1 fracture with progression of height loss and retropulsion.  An x-ray of the lumbar spine upright was ordered for review this visit.  Patient has a history of osteoporosis and recently started Prolia                REVIEW OF SYSTEMS  Review of Systems   Respiratory:  Negative for chest tightness and shortness of breath.    Gastrointestinal: Negative.    Genitourinary: Negative.    Musculoskeletal:  Positive for back pain (mild - moderate, intermittent).        Wears brace occasionally as needed   Neurological:  Negative for weakness and numbness.         Meds/Allergies     Current Outpatient Medications   Medication Sig Dispense Refill    Acetylcysteine (NAC) 600 MG CAPS daily      Calcium Carb-Cholecalciferol (Calcium 1000 + D) 1000-20 MG-MCG TABS Take 1 tablet by mouth 2 (two) times a day      Creon 56563-66866 units capsule 3 (three) times a day with meals      cycloSPORINE (Restasis) 0.05 % ophthalmic emulsion Administer 1 drop to both eyes every 12 (twelve) hours      denosumab (PROLIA) 60 mg/mL Inject under the skin once      Fructooligosaccharides-Inulin (Prebiotic Inulin-FOS) POWD daily      Ginger 500 MG CAPS Take by mouth 500 mg daily      MAGNESIUM CARBONATE PO Take 1 tablet by mouth daily as needed      Magnesium Citrate (CITROMA PO) if needed As directed      Multiple Vitamin (Multi Vitamin Daily) TABS Take by mouth      Multiple Vitamins-Iron (CHLORELLA PO) daily      NON FORMULARY daily Triple magnesium (glycinate 10%, oxide 60%, malate 30%,)       Omega-3 Fatty Acids (fish oil) 1,000 mg Take 2,000 mg by mouth daily      patient supplied medication Take 1 each by mouth daily One probiotic daily      Pyridoxine HCl (Vitamin B6) 100 MG TABS in the morning      tamsulosin (FLOMAX) 0.4 mg Take 1 capsule (0.4 mg total) by mouth daily with dinner 90 capsule 3    lidocaine (Lidoderm) 5 % Apply 1 patch topically over 12 hours daily Remove & Discard patch within 12 hours or as directed by MD 14 patch 0    vitamin k 100 MCG tablet Take 100 mcg by mouth daily       No current facility-administered medications for this visit.       No Known Allergies    PAST HISTORY    Past Medical History:   Diagnosis Date    Tsai esophagus 2024    Took famotidine for years    Compression fracture of body of thoracic vertebra (HCC) 2024    T12, L5, L1    Marleni Marcelo virus infection 2024    GERD (gastroesophageal reflux disease) 2024    History of kyphoplasty     Kidney stone     Lumbar vertebral fracture (HCC)     Memory loss 2024    Osteoporosis     Thoracic vertebral fracture (HCC)        Past Surgical History:   Procedure Laterality Date    COLONOSCOPY      Within last 5 years    FL RETROGRADE PYELOGRAM  3/29/2024    HERNIA REPAIR      IR KYPHOPLASTY/VERTEBROPLASTY  2024    KNEE SURGERY      DC CYSTO/URETERO W/LITHOTRIPSY &INDWELL STENT INSRT Right 3/29/2024    Procedure: CYSTO USCOPE W/  LASER, &  STENT;  Surgeon: Salvatore Celis MD;  Location: [facility];  Service: Urology    UPPER GASTROINTESTINAL ENDOSCOPY         Social History     Tobacco Use    Smoking status: Former     Current packs/day: 0.00     Types: Cigarettes     Start date:      Quit date: 1975     Years since quittin.5     Passive exposure: Never    Smokeless tobacco: Never    Tobacco comments:     Causal smoker as a young man 51 years ago   Vaping Use    Vaping status: Never Used   Substance Use Topics    Alcohol use: Not Currently    Drug use: Never       Family  "History   Problem Relation Age of Onset    Brain cancer Mother     Cancer Mother     Aneurysm Father        The following portions of the patient's history were reviewed and updated as appropriate: allergies, current medications, past family history, past medical history, past social history, past surgical history and problem list.      EXAM    Vitals:Blood pressure 116/72, pulse (!) 51, temperature (!) 97.4 °F (36.3 °C), temperature source Temporal, resp. rate 16, height 5' 8\" (1.727 m), weight 69.4 kg (153 lb), SpO2 97%.,Body mass index is 23.26 kg/m².     Physical Exam  Vitals and nursing note reviewed.   Constitutional:       General: He is not in acute distress.     Appearance: Normal appearance. He is not ill-appearing.   HENT:      Head: Normocephalic and atraumatic.   Pulmonary:      Effort: Pulmonary effort is normal. No respiratory distress.   Musculoskeletal:      Right lower leg: No edema.      Left lower leg: No edema.      Comments: Limitation in lumbar movement and upper ext secondary to compression fracture 4 weeks post dx    Skin:     General: Skin is warm and dry.   Neurological:      General: No focal deficit present.      Mental Status: He is alert and oriented to person, place, and time.      Motor: Motor strength is normal.     Gait: Gait is intact.   Psychiatric:         Mood and Affect: Mood normal.         Behavior: Behavior normal.         Neurologic Exam     Mental Status   Oriented to person, place, and time.   Level of consciousness: alert    Motor Exam     Strength   Strength 5/5 throughout.     Sensory Exam   Light touch normal.     Gait, Coordination, and Reflexes     Gait  Gait: normal      Imaging Studies  XR spine thoracolumbar 2 vw Result Date: 6/26/2024  Narrative: XR SPINE THORACOLUMBAR 2 VW INDICATION: M80.00XA: Age-related osteoporosis with current pathological fracture, unspecified site, initial encounter for fracture S32.010A: Wedge compression fracture of first lumbar " vertebra, initial encounter for closed fracture. COMPARISON: MR thoracic and lumbar spine 5/25/2024. FINDINGS: Stable L1 vertebral plana with mild bony retropulsion. Stable chronic T12 and L5 compression deformity status post vertebroplasty. Intact pedicles. Normal alignment. Age-related degenerative changes are seen. No displacement of the paraspinal line.     Impression: 2.  Sitting to workstation performed: [id number]       I have personally reviewed pertinent reports.   and I have personally reviewed pertinent films in PACS    I have spent a total time of 30 minutes on 06/27/24 in caring for this patient including Diagnostic results, Risks and benefits of tx options, Instructions for management, Patient and family education, Importance of tx compliance, Risk factor reductions, Impressions, Counseling / Coordination of care, Documenting in the medical record, Reviewing / ordering tests, medicine, procedures  , Obtaining or reviewing history  , and Communicating with other healthcare professionals .     PLEASE NOTE:  This encounter may have been completed utilizing the [employer] Direct Speech Voice Recognition Software. Grammatical errors, random word insertions, pronoun errors and incomplete sentences are occasional consequences of the system due to software limitations, ambient noise and hardware issues.These may be missed even after proof reading prior to affixing electronic signature. Please do not hesitate to contact me directly if you have any questions or concerns about the content, text or information contained within the body of this dictation.

## 2024-06-29 NOTE — ASSESSMENT & PLAN NOTE
As addressed in HPI  Wife admits patient occasionally wears brace, he arrives not wearing the brace same as last visit  Patient continues with complaint of mild to moderate intermittent upper lumbar back pain, is not specific in regards to when this occurred  Wife describes she is on strict activity restriction and does not think.  Nonfocal examination  Denies bowel or bladder dysfunction, saddle anesthesia, or motor deficit  in lower extremities.  Wife questions if patient can start physical therapy  History of osteoporosis, is under the care of endocrine at [facility], and recently started Prolia.      Imaging  6/26/2024 x-ray lumbar spine upright in brace.Stable L1 vertebral plana with mild bony retropulsion. Stable chronic T12 and L5 compression deformity status post vertebroplasty.      Plan  Reviewed imaging with patient and wife.  Reinforced compression fracture management as per protocol, LSO brace, activity restrictions,  Reinforced with wife the natural nature of compression fracture and expected healing time 8 to 12 weeks after diagnosis, explained do not want to start physical therapy prematurely as patient has osteoporosis and is at risk for progression of fracture of L1.  Plan for return to office in 8 weeks without imaging..  Explained to wife and patient during next visit in 8 weeks we will perform flexion and extension in the office, if patient is stable without complaints of back pain or radicular symptoms we will order x-ray lumbar spine flexion and extension 6 views, if imaging is stable we will order physical therapy to start.  Both wife and patient are in agreement with plan.  Reviewed red flag signs advised if patient develops wife is to take him to the closest emergency department for assessment.  Advised if additional questions or concerns to contact the neurosurgery office.      Metrics Compression Fracture   6/29/2024 RM TIN362, ODQ16/45=35%, OD3P7T606/0.586, [facility]  75/80%,VAS5/10.     3/7/2024 RM/LBP14/24, ODQ19/50,  EQ5D5L..425/99240, [facility] 70%, VAS 5/10

## 2024-07-01 ENCOUNTER — HOSPITAL ENCOUNTER (OUTPATIENT)
Dept: RADIOLOGY | Age: 74
Discharge: HOME | End: 2024-07-01
Payer: MEDICARE

## 2024-07-01 DIAGNOSIS — N20.0 NEPHROLITHIASIS: ICD-10-CM

## 2024-07-01 PROCEDURE — 76770 US EXAM ABDO BACK WALL COMP: CPT

## 2024-07-08 ENCOUNTER — TELEPHONE (OUTPATIENT)
Dept: UROLOGY | Facility: MEDICAL CENTER | Age: 74
End: 2024-07-08

## 2024-07-08 DIAGNOSIS — N13.2 HYDRONEPHROSIS WITH OBSTRUCTING CALCULUS: Primary | ICD-10-CM

## 2024-07-08 NOTE — TELEPHONE ENCOUNTER
----- Message from Salvatore Celis MD sent at 7/8/2024 11:36 AM EDT -----    I have ordered a CT stone study on this patient.  He was originally referred to me for ureteroscopic laser lithotripsy on the right.  My initial impression was that his stone was almost inappropriately large for this approach and that percutaneous neph would be more productive and complete however the patient wished to proceed with ureteroscopy.  We can use this CT to assess residual stone burden and again reevaluate for the appropriate approach to those stones.  Finally, the patient has persistent hydronephrosis which may be secondary to extrinsic compression from a lower pole cyst.  Plan would be to try to render this patient's stone free and then assess the upper tract for degree of obstruction if any

## 2024-07-08 NOTE — TELEPHONE ENCOUNTER
Spoke to pt's wife and advised that Dr. Celis ordered a CT stone study to assess stone burden to help determine the correct surgical approach.  Number for central scheduling provided.  Pt is scheduled for 7/12/24 in [place] office to discuss stone risk profile.

## 2024-07-12 ENCOUNTER — OFFICE VISIT (OUTPATIENT)
Dept: UROLOGY | Facility: AMBULATORY SURGERY CENTER | Age: 74
End: 2024-07-12

## 2024-07-12 ENCOUNTER — HOSPITAL ENCOUNTER (OUTPATIENT)
Dept: RADIOLOGY | Age: 74
Discharge: HOME | End: 2024-07-12
Payer: MEDICARE

## 2024-07-12 VITALS
OXYGEN SATURATION: 98 % | SYSTOLIC BLOOD PRESSURE: 116 MMHG | HEART RATE: 65 BPM | WEIGHT: 151 LBS | BODY MASS INDEX: 22.88 KG/M2 | HEIGHT: 68 IN | DIASTOLIC BLOOD PRESSURE: 82 MMHG

## 2024-07-12 DIAGNOSIS — K59.09 OTHER CONSTIPATION: ICD-10-CM

## 2024-07-12 DIAGNOSIS — N20.0 NEPHROLITHIASIS: Primary | ICD-10-CM

## 2024-07-12 PROCEDURE — 74176 CT ABD & PELVIS W/O CONTRAST: CPT

## 2024-07-12 NOTE — ASSESSMENT & PLAN NOTE
Patient underwent ureteroscopy on 3/29/2024 by Dr. Celis  Kidney and bladder ultrasound from 7/1/2024 demonstrated right renal nephrolithiasis measuring 1.7 cm, persistent mild right hydronephrosis, no renal calculi noted in the left kidney  CT stone study not obtained prior to today's office visit, plan to obtain and call patient with results, importance explained to obtain CT imaging to evaluate residual stone burden and persistent hydronephrosis, hydronephrosis may be secondary to extrinsic compression from a lower pole cyst  Discussed with the patient that we would try and render him stone free and then assess the upper tract for any further obstruction  Educated on the importance of fluid intake and consuming 2 L of water daily for further stone prevention  Educated on a low calcium oxalate diet  Referral placed for nephrology for further medical management of kidney stones  Plan for CT imaging and then follow-up with Dr. Celis for further surgical discussion and definitive stone management    Nephrolithiasis workup on 4/5/2024:  PTH 54  Uric acid 3.7  CMP overall unremarkable, potassium 5.3  Stone risk profile completed: Magnesium 262, pH 7.42, phosphorus 411, potassium 107, calcium oxalate 10.67

## 2024-07-12 NOTE — PROGRESS NOTES
Assessment and plan:     Nephrolithiasis  Patient underwent ureteroscopy on 3/29/2024 by Dr. Celis  Kidney and bladder ultrasound from 7/1/2024 demonstrated right renal nephrolithiasis measuring 1.7 cm, persistent mild right hydronephrosis, no renal calculi noted in the left kidney  CT stone study not obtained prior to today's office visit, plan to obtain and call patient with results, importance explained to obtain CT imaging to evaluate residual stone burden and persistent hydronephrosis, hydronephrosis may be secondary to extrinsic compression from a lower pole cyst  Discussed with the patient that we would try and render him stone free and then assess the upper tract for any further obstruction  Educated on the importance of fluid intake and consuming 2 L of water daily for further stone prevention  Educated on a low calcium oxalate diet  Referral placed for nephrology for further medical management of kidney stones  Plan for CT imaging and then follow-up with Dr. Celis for further surgical discussion and definitive stone management    Nephrolithiasis workup on 4/5/2024:  PTH 54  Uric acid 3.7  CMP overall unremarkable, potassium 5.3  Stone risk profile completed: Magnesium 262, pH 7.42, phosphorus 411, potassium 107, calcium oxalate 10.67    Other constipation  Patient states that he was recently placed on a medication for his pancreas that seems to cause constipation.  He has been managing this at home with decreasing the dosage of the medication as well as trying to maintain a healthy bowel regimen.  He states that he was constipated yesterday and noticed some bothersome urinary symptoms while he was constipated.  This included feelings of incomplete emptying and urinary hesitancy.  Once the constipation improved he stated that the urinary symptoms have since gone away.  No plan to adjust any medications at this time.  Plan to follow-up with prescribing provider regarding the pancreatic medication  "and helping to avoid constipation in the future.    History of Present Illness     Jose Raza is a 73 y.o. male presents today to the office for follow-up of nephrolithiasis.  He underwent ureteroscopy on 3/29/2024 by Dr. Celis.  It was discussed with the patient that the best way to definitively manage the stones were to undergo a percutaneous approach but the patient wished to defer and proceed with ureteroscopy.  On most recent ultrasound imaging it does appear that he continues with a 1.7 cm renal calculi on the right side.  He did not have a CT scan to assess his stone burden prior to this appointment.  He did have a nephrolithiasis workup with a PTH, uric acid, CMP, and stone risk profile.    Today in the office the patient states he overall is doing very well since his surgery with Dr. Celis on 3/29/2024.  He denies any pain or discomfort today in the office.  He denies any hematuria, dysuria, suprapubic or flank pain.  He states that he does drink lots of water throughout the day.  We did have a discussion about low calcium oxalate diet and he denies any overconsumption of any of the foods.      Laboratory     Lab Results   Component Value Date    BUN 11 04/05/2024    CREATININE 0.88 04/05/2024       No components found for: \"GFR\"    Lab Results   Component Value Date    CALCIUM 9.5 04/05/2024    K 5.3 04/05/2024    CO2 29 04/05/2024     04/05/2024       Lab Results   Component Value Date    WBC 4.93 03/18/2024    HGB 13.9 03/18/2024    HCT 43.0 03/18/2024    MCV 96 03/18/2024     03/18/2024       Lab Results   Component Value Date    PSA 0.48 02/22/2024       No results found for this or any previous visit (from the past 1 hour(s)).    Review of Systems     Review of Systems   Constitutional:  Negative for chills and fever.   Respiratory: Negative.  Negative for cough and shortness of breath.    Cardiovascular: Negative.  Negative for chest pain.   Gastrointestinal: Negative.  " Negative for abdominal distention, abdominal pain, nausea and vomiting.   Genitourinary:  Negative for decreased urine volume, difficulty urinating, dysuria, flank pain, frequency, hematuria, penile discharge, penile pain, penile swelling, scrotal swelling, testicular pain and urgency.   Skin: Negative.  Negative for rash.   Neurological: Negative.    Hematological:  Negative for adenopathy. Does not bruise/bleed easily.       AUA SYMPTOM SCORE      Flowsheet Row Most Recent Value   AUA SYMPTOM SCORE    How often have you had a sensation of not emptying your bladder completely after you finished urinating? 0 (P)     How often have you had to urinate again less than two hours after you finished urinating? 0 (P)     How often have you found you stopped and started again several times when you urinate? 0 (P)     How often have you found it difficult to postpone urination? 0 (P)     How often have you had a weak urinary stream? 0 (P)     How often have you had to push or strain to begin urination? 0 (P)     How many times did you most typically get up to urinate from the time you went to bed at night until the time you got up in the morning? 5 (P)     Quality of Life: If you were to spend the rest of your life with your urinary condition just the way it is now, how would you feel about that? 1 (P)     AUA SYMPTOM SCORE 5 (P)                    Allergies     No Known Allergies    Physical Exam     Physical Exam  Vitals reviewed.   Constitutional:       Appearance: Normal appearance.   HENT:      Head: Normocephalic and atraumatic.   Eyes:      Pupils: Pupils are equal, round, and reactive to light.   Cardiovascular:      Rate and Rhythm: Normal rate.   Pulmonary:      Effort: Pulmonary effort is normal.   Musculoskeletal:      Cervical back: Normal range of motion.   Skin:     General: Skin is warm and dry.   Neurological:      General: No focal deficit present.      Mental Status: He is alert and oriented to person,  "place, and time.   Psychiatric:         Mood and Affect: Mood normal.         Behavior: Behavior normal.         Thought Content: Thought content normal.         Judgment: Judgment normal.         Vital Signs     Vitals:    07/12/24 1046   BP: 116/82   BP Location: Left arm   Patient Position: Sitting   Cuff Size: Adult   Pulse: 65   SpO2: 98%   Weight: 68.5 kg (151 lb)   Height: 5' 8\" (1.727 m)       Current Medications       Current Outpatient Medications:     Acetylcysteine (NAC) 600 MG CAPS, daily, Disp: , Rfl:     Calcium Carb-Cholecalciferol (Calcium 1000 + D) 1000-20 MG-MCG TABS, Take 1 tablet by mouth 2 (two) times a day, Disp: , Rfl:     Creon [id number] units capsule, 3 (three) times a day with meals, Disp: , Rfl:     cycloSPORINE (Restasis) 0.05 % ophthalmic emulsion, Administer 1 drop to both eyes every 12 (twelve) hours, Disp: , Rfl:     denosumab (PROLIA) 60 mg/mL, Inject under the skin once, Disp: , Rfl:     Fructooligosaccharides-Inulin (Prebiotic Inulin-FOS) POWD, daily, Disp: , Rfl:     Ginger 500 MG CAPS, Take by mouth 500 mg daily, Disp: , Rfl:     MAGNESIUM CARBONATE PO, Take 1 tablet by mouth daily as needed, Disp: , Rfl:     Magnesium Citrate (CITROMA PO), if needed As directed, Disp: , Rfl:     Multiple Vitamin (Multi Vitamin Daily) TABS, Take by mouth, Disp: , Rfl:     NON FORMULARY, daily Triple magnesium (glycinate 10%, oxide 60%, malate 30%,), Disp: , Rfl:     Omega-3 Fatty Acids (fish oil) 1,000 mg, Take 2,000 mg by mouth daily, Disp: , Rfl:     patient supplied medication, Take 1 each by mouth daily One probiotic daily, Disp: , Rfl:     Pyridoxine HCl (Vitamin B6) 100 MG TABS, in the morning, Disp: , Rfl:     tamsulosin (FLOMAX) 0.4 mg, Take 1 capsule (0.4 mg total) by mouth daily with dinner, Disp: 90 capsule, Rfl: 3    Multiple Vitamins-Iron (CHLORELLA PO), daily (Patient not taking: Reported on 7/12/2024), Disp: , Rfl:     Active Problems     Patient Active Problem List   Diagnosis "    Cognitive impairment    Chronic fatigue syndrome    Nail abnormality    Iron deficiency anemia    Acute left-sided low back pain without sciatica    Nephrolithiasis    Other constipation    Compression fracture of body of thoracic vertebra (HCC)    Compression fracture of fifth lumbar vertebra (HCC)    Age-related osteoporosis with current pathological fracture    Tsai esophagus    GERD (gastroesophageal reflux disease)    Memory loss    Encounter to establish care       Past Medical History     Past Medical History:   Diagnosis Date    Tsai esophagus 2024    Took famotidine for years    Compression fracture of body of thoracic vertebra (HCC) 2024    T12, L5, L1    Marleni Marcelo virus infection 2024    GERD (gastroesophageal reflux disease) 2024    History of kyphoplasty     Kidney stone     Lumbar vertebral fracture (HCC)     Memory loss 2024    Osteoporosis     Thoracic vertebral fracture (HCC)        Surgical History     Past Surgical History:   Procedure Laterality Date    COLONOSCOPY      Within last 5 years    FL RETROGRADE PYELOGRAM  3/29/2024    HERNIA REPAIR      IR KYPHOPLASTY/VERTEBROPLASTY  2024    KNEE SURGERY      NH CYSTO/URETERO W/LITHOTRIPSY &INDWELL STENT INSRT Right 3/29/2024    Procedure: CYSTO USCOPE W/  LASER, &  STENT;  Surgeon: Salvatore Celis MD;  Location: [facility];  Service: Urology    UPPER GASTROINTESTINAL ENDOSCOPY         Family History     Family History   Problem Relation Age of Onset    Brain cancer Mother     Cancer Mother     Aneurysm Father        Social History     Social History     Social History     Tobacco Use   Smoking Status Former    Current packs/day: 0.00    Types: Cigarettes    Start date:     Quit date: 1975    Years since quittin.5    Passive exposure: Never   Smokeless Tobacco Never   Tobacco Comments    Causal smoker as a young man 50 years ago       Past Surgical History:   Procedure Laterality Date     COLONOSCOPY      Within last 5 years    FL RETROGRADE PYELOGRAM  3/29/2024    HERNIA REPAIR      IR KYPHOPLASTY/VERTEBROPLASTY  4/23/2024    KNEE SURGERY      [place] CYSTO/URETERO W/LITHOTRIPSY &INDWELL STENT INSRT Right 3/29/2024    Procedure: CYSTO USCOPE W/  LASER, &  STENT;  Surgeon: Salvatore Celis MD;  Location: [facility] OR;  Service: Urology    UPPER GASTROINTESTINAL ENDOSCOPY           The following portions of the patient's history were reviewed and updated as appropriate: allergies, current medications, past family history, past medical history, past social history, past surgical history and problem list    Please note :  Voice dictation software has been used to create this document.  There may be inadvertent transcription errors.    NICOL Whitehead

## 2024-07-12 NOTE — ASSESSMENT & PLAN NOTE
Patient states that he was recently placed on a medication for his pancreas that seems to cause constipation.  He has been managing this at home with decreasing the dosage of the medication as well as trying to maintain a healthy bowel regimen.  He states that he was constipated yesterday and noticed some bothersome urinary symptoms while he was constipated.  This included feelings of incomplete emptying and urinary hesitancy.  Once the constipation improved he stated that the urinary symptoms have since gone away.  No plan to adjust any medications at this time.  Plan to follow-up with prescribing provider regarding the pancreatic medication and helping to avoid constipation in the future.

## 2024-07-23 ENCOUNTER — TELEPHONE (OUTPATIENT)
Dept: INTERNAL MEDICINE CLINIC | Facility: CLINIC | Age: 74
End: 2024-07-23

## 2024-07-23 NOTE — TELEPHONE ENCOUNTER
Per Dr Simon schedule for further eval of memory concerns, [facility] 7/29   Nasalis-Muscle-Based Myocutaneous Island Pedicle Flap Text: Using a #15 blade, an incision was made around the donor flap to the level of the nasalis muscle. Wide lateral undermining was then performed in both the subcutaneous plane above the nasalis muscle, and in a submuscular plane just above periosteum. This allowed the formation of a free nasalis muscle axial pedicle (based on the angular artery) which was still attached to the actual cutaneous flap, increasing its mobility and vascular viability. Hemostasis was obtained with pinpoint electrocoagulation. The flap was mobilized into position and the pivotal anchor points positioned and stabilized with buried interrupted sutures. Subcutaneous and dermal tissues were closed in a multilayered fashion with sutures. Tissue redundancies were excised, and the epidermal edges were apposed without significant tension and sutured with sutures.

## 2024-08-06 ENCOUNTER — RA CDI HCC (OUTPATIENT)
Dept: OTHER | Facility: HOSPITAL | Age: 74
End: 2024-08-06

## 2024-08-16 ENCOUNTER — OFFICE VISIT (OUTPATIENT)
Dept: NEUROSURGERY | Facility: CLINIC | Age: 74
End: 2024-08-16
Payer: MEDICARE

## 2024-08-16 ENCOUNTER — HOSPITAL ENCOUNTER (OUTPATIENT)
Dept: RADIOLOGY | Facility: HOSPITAL | Age: 74
Discharge: HOME | End: 2024-08-16
Payer: MEDICARE

## 2024-08-16 VITALS
HEIGHT: 68 IN | WEIGHT: 151 LBS | RESPIRATION RATE: 18 BRPM | OXYGEN SATURATION: 96 % | BODY MASS INDEX: 22.88 KG/M2 | HEART RATE: 50 BPM | DIASTOLIC BLOOD PRESSURE: 72 MMHG | TEMPERATURE: 97.8 F | SYSTOLIC BLOOD PRESSURE: 122 MMHG

## 2024-08-16 DIAGNOSIS — M80.00XA AGE-RELATED OSTEOPOROSIS WITH CURRENT PATHOLOGICAL FRACTURE, INITIAL ENCOUNTER: ICD-10-CM

## 2024-08-16 DIAGNOSIS — S22.000A COMPRESSION FRACTURE OF BODY OF THORACIC VERTEBRA (HCC): ICD-10-CM

## 2024-08-16 DIAGNOSIS — M80.00XA AGE-RELATED OSTEOPOROSIS WITH CURRENT PATHOLOGICAL FRACTURE, INITIAL ENCOUNTER: Primary | ICD-10-CM

## 2024-08-16 PROCEDURE — 72114 X-RAY EXAM L-S SPINE BENDING: CPT

## 2024-08-16 PROCEDURE — 99214 OFFICE O/P EST MOD 30 MIN: CPT | Performed by: NURSE PRACTITIONER

## 2024-08-16 NOTE — PROGRESS NOTES
Assessment/Plan:    Age-related osteoporosis with current pathological fracture  As addressed in HPI  MRI imaging demonstrated a superimposed subacute on chronic L1 compression fracture with progression and loss of height to plana with retropulsion.  At last office visit 6/27/2024 recommend patient resume conservative management with bracing and activity restriction, wife was very reluctant to this plan.  Wife was disappointed physical therapy could not be ordered.  Explains at last visit patient will return to office in 8 weeks at which time flexion and extension would be performed if patient remains stable can DC spine precautions, order an x-ray with flexion and extension and if stable order physical therapy.  Patient's wife questions if with physical therapy aqua therapy could also be added.  Wife reports patient has been walking only short distances in which she has some minor low back pain.  Wife reports patient has pain daily.  Wife admits she only uses brace if patient is going to go out for a long distance walk.  Patient admitted at times he is lateral low back pain on the right and left sides,  Patient denied TTP during thoracic lumbar spine palpation midline and laterally.  Patient denied bowel or bladder problems, did not demonstrate gait instability or complaint of saddle anesthesia.  Wife expresses that she is pleased that the patient will be able to resume normal activities soon.    Plan  Perform flexion and extension during office visit patient was asymptomatic and performed all movements without.  Ordered x-ray lumbar spine flexion and extension 6 views advised wife to complete today if possible.  Inform wife once I review x-ray imaging if stable will order physical therapy for muscle strengthening of core, lumbar thoracic paraspinal muscles, upper extremity, and aqua therapy as appropriate.  Wife is unsure if patient will attend physical therapy under Dr. Powers at [facility] or at [facility].   Wife will obtain prescription off of MyChart once ordered.  Remind wife and reinforced if x-rays are stable patient should gradually resume activity.  Reviewed red flag signs with wife explained if patient develops she should go to the closest emergency room for assessment.  Advised wife if she has additional questions or concerns she should contact neurosurgery        Metrics Compression Fracture   8/16/24 RM LBP12/24, ODQ15/45  EQ5D5 [id number] [facility]85% VAS 5/10 today 3/10    6/29/2024 RM AUU439, ODQ16/45=35%, RU6H9L195/0.586, [facility] 75/80%,VAS5/10.      3/7/2024 RM/LBP14/24, ODQ19/50,  EQ5D5L..785/01307, [facility] 70%, VAS 5/10                 .  Patient ID: Jose Raza is a 74 y.o. male PM/SH; as addressed in designated section below    HPI   Patient is well-known to neurosurgery for compression fractures and status post kyphoplasty.  Patient initially consulted with neurosurgery 3/25/2024 for age-related osteoporosis with current pathological compression fractures of T12, L1, and L5.  Patient has a history of osteoporosis and is treating with Prolia.  Patient was status post a fall outside while shoveling snow.  Patient underwent kyphoplasty on 4/23/2024 of T12 and L4 performed by Dr. Putnam.  During a 5/13/2024 visit approximately 3 weeks after the kyphoplasty patient presented with increased back pain complaints as severe since procedure.  He had no new complaints of radiating pain numbness tingling or walking.  On 6/7/2024 patient return to the office for a follow-up MRI imaging review of 5/25/2024 they demonstrated a new recent injury of superimposed on subacute chronic L1 compression fracture with progression of height loss, diffuse marrow edema and mild retropulsion compared to prior MRI examination.  Patient continued with complaints of mild to moderate intermittent upper lumbar spine pain, he cannot identify what aggravates the symptoms or when the  lumbar spine pain complains only that he has the pain.  He denied bowel or bladder dysfunction, saddle anesthesia or motor deficits in his lower extremities.  He presented with a nonfocal examination.  Due to the new compression fracture on imaging at last visit recommend patient continue with lumbar spine precautions and resume use of a brace.  Wife was reluctant and did not want patient to resume the brace as well she was very reluctant and resistive to patient resuming activity restrictions.  Wife requested patient to start physical therapy.  Explained the negative implication of starting physical therapy with evidence of a new compression fracture with increased loss of height and some retropulsion on MRI imaging in the setting of osteoporosis.   An x-ray lumbar spine upright in brace 6/26/2024 demonstrated a stable L1 vertebral plana with mild retropulsion.  Stable chronic T12 and L5 compression deformities status post vertebroplasty.  Plan for patient to follow-up in office in 8 weeks at which time we will perform lumbar flexion and extension if patient tolerated the same and x-ray with flexion and extension would be ordered if x-ray was stable we will order physical therapy.        REVIEW OF SYSTEMS  Review of Systems   Constitutional:  Positive for fatigue.   HENT: Negative.     Eyes: Negative.    Respiratory: Negative.     Cardiovascular: Negative.    Gastrointestinal: Negative.    Endocrine: Negative.    Genitourinary: Negative.    Musculoskeletal:  Negative for gait problem and myalgias. Back pain: varies with activity and depends on how long he's up on his feet.       Occasionally will have pain on the sides (rib cage area)    Allergic/Immunologic: Negative.    Neurological:  Negative for weakness and numbness.   Hematological: Negative.    Psychiatric/Behavioral: Negative.           Meds/Allergies     Current Outpatient Medications   Medication Sig Dispense Refill    Acetylcysteine (NAC) 600 MG CAPS  daily      Calcium Carb-Cholecalciferol (Calcium 1000 + D) 1000-20 MG-MCG TABS Take 1 tablet by mouth 2 (two) times a day      Creon [id number] units capsule 3 (three) times a day with meals      cycloSPORINE (Restasis) 0.05 % ophthalmic emulsion Administer 1 drop to both eyes every 12 (twelve) hours      denosumab (PROLIA) 60 mg/mL Inject under the skin once      Fructooligosaccharides-Inulin (Prebiotic Inulin-FOS) POWD daily      MAGNESIUM CARBONATE PO Take 1 tablet by mouth daily as needed      Magnesium Citrate (CITROMA PO) if needed As directed      Multiple Vitamin (Multi Vitamin Daily) TABS Take by mouth      NON FORMULARY daily Triple magnesium (glycinate 10%, oxide 60%, malate 30%,)      Omega-3 Fatty Acids (fish oil) 1,000 mg Take 2,000 mg by mouth daily      patient supplied medication Take 1 each by mouth daily One probiotic daily      Pyridoxine HCl (Vitamin B6) 100 MG TABS in the morning      tamsulosin (FLOMAX) 0.4 mg Take 1 capsule (0.4 mg total) by mouth daily with dinner 90 capsule 3    Multiple Vitamins-Iron (CHLORELLA PO) daily (Patient not taking: Reported on 7/12/2024)       No current facility-administered medications for this visit.       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    GERD (gastroesophageal reflux disease) 03/29/2024    History of kyphoplasty     Kidney stone     Lumbar vertebral fracture (HCC)     Memory loss 03/29/2024    Osteoporosis     Thoracic vertebral fracture (HCC)        Past Surgical History:   Procedure Laterality Date    COLONOSCOPY      Within last 5 years    FL RETROGRADE PYELOGRAM  3/29/2024    HERNIA REPAIR      IR KYPHOPLASTY/VERTEBROPLASTY  4/23/2024    KNEE SURGERY      ID CYSTO/URETERO W/LITHOTRIPSY &INDWELL STENT INSRT Right 3/29/2024     "Procedure: CYSTO USCOPE W/  LASER, &  STENT;  Surgeon: Salvatore Celis MD;  Location: [facility];  Service: Urology    UPPER GASTROINTESTINAL ENDOSCOPY         Social History     Tobacco Use    Smoking status: Former     Current packs/day: 0.00     Types: Cigarettes     Start date:      Quit date: 1975     Years since quittin.6     Passive exposure: Never    Smokeless tobacco: Never    Tobacco comments:     Causal smoker as a young man 50 years ago   Vaping Use    Vaping status: Never Used   Substance Use Topics    Alcohol use: Not Currently    Drug use: Never       Family History   Problem Relation Age of Onset    Brain cancer Mother     Cancer Mother     Aneurysm Father        The following portions of the patient's history were reviewed and updated as appropriate: allergies, current medications, past family history, past medical history, past social history, past surgical history and problem list.      EXAM    Vitals:Blood pressure 122/72, pulse (!) 50, temperature 97.8 °F (36.6 °C), temperature source Temporal, resp. rate 18, height 5' 8\" (1.727 m), weight 68.5 kg (151 lb), SpO2 96%.,Body mass index is 22.96 kg/m².     Physical Exam  Vitals and nursing note reviewed. Exam conducted with a chaperone present (wife).   Constitutional:       General: He is not in acute distress.     Appearance: He is not ill-appearing.   Pulmonary:      Effort: Pulmonary effort is normal. No respiratory distress.   Musculoskeletal:         General: Normal range of motion.      Right lower leg: No edema.      Left lower leg: No edema.   Neurological:      General: No focal deficit present.      Mental Status: He is alert.      Motor: Motor strength is normal.     Gait: Gait is intact.   Psychiatric:         Mood and Affect: Mood normal.         Behavior: Behavior normal.         Neurologic Exam     Mental Status   Level of consciousness: alert    Motor Exam     Strength   Strength 5/5 throughout.     Sensory Exam "   Light touch normal.     Gait, Coordination, and Reflexes     Gait  Gait: normal      Imaging Studies      I have personally reviewed pertinent reports.       I have spent a total time of 30 minutes on 08/16/24 in caring for this patient including Diagnostic results, Risks and benefits of tx options, Instructions for management, Patient and family education, Importance of tx compliance, Risk factor reductions, Impressions, Counseling / Coordination of care, Documenting in the medical record, Reviewing / ordering tests, medicine, procedures  , Obtaining or reviewing history  , and Communicating with other healthcare professionals .     PLEASE NOTE:  This encounter may have been completed utilizing the [employer] Direct Speech Voice Recognition Software. Grammatical errors, random word insertions, pronoun errors and incomplete sentences are occasional consequences of the system due to software limitations, ambient noise and hardware issues.These may be missed even after proof reading prior to affixing electronic signature. Please do not hesitate to contact me directly if you have any questions or concerns about the content, text or information contained within the body of this dictation.

## 2024-08-17 NOTE — ASSESSMENT & PLAN NOTE
As addressed in HPI  MRI imaging demonstrated a superimposed subacute on chronic L1 compression fracture with progression and loss of height to plana with retropulsion.  At last office visit 6/27/2024 recommend patient resume conservative management with bracing and activity restriction, wife was very reluctant to this plan.  Wife was disappointed physical therapy could not be ordered.  Explains at last visit patient will return to office in 8 weeks at which time flexion and extension would be performed if patient remains stable can DC spine precautions, order an x-ray with flexion and extension and if stable order physical therapy.  Patient's wife questions if with physical therapy aqua therapy could also be added.  Wife reports patient has been walking only short distances in which she has some minor low back pain.  Wife reports patient has pain daily.  Wife admits she only uses brace if patient is going to go out for a long distance walk.  Patient admitted at times he is lateral low back pain on the right and left sides,  Patient denied TTP during thoracic lumbar spine palpation midline and laterally.  Patient denied bowel or bladder problems, did not demonstrate gait instability or complaint of saddle anesthesia.  Wife expresses that she is pleased that the patient will be able to resume normal activities soon.    Plan  Perform flexion and extension during office visit patient was asymptomatic and performed all movements without.  Ordered x-ray lumbar spine flexion and extension 6 views advised wife to complete today if possible.  Inform wife once I review x-ray imaging if stable will order physical therapy for muscle strengthening of core, lumbar thoracic paraspinal muscles, upper extremity, and aqua therapy as appropriate.  Wife is unsure if patient will attend physical therapy under Dr. Powers at [facility] or at [facility].  Wife will obtain prescription off of MyChart once ordered.  Remind wife and  reinforced if x-rays are stable patient should gradually resume activity.  Reviewed red flag signs with wife explained if patient develops she should go to the closest emergency room for assessment.  Advised wife if she has additional questions or concerns she should contact neurosurgery        Metrics Compression Fracture   8/16/24 RM LBP12/24, ODQ15/45  EQ5D5 [id number] [facility]85% VAS 5/10 today 3/10    6/29/2024 RM RTX633, ODQ16/45=35%, SQ1L7A108/0.586, [facility] 75/80%,VAS5/10.      3/7/2024 RM/LBP14/24, ODQ19/50,  EQ5D5L..[id number], [facility] 70%, VAS 5/10

## 2024-08-27 ENCOUNTER — PATIENT MESSAGE (OUTPATIENT)
Dept: NEUROSURGERY | Facility: CLINIC | Age: 74
End: 2024-08-27

## 2024-08-27 DIAGNOSIS — M80.00XA AGE-RELATED OSTEOPOROSIS WITH CURRENT PATHOLOGICAL FRACTURE, INITIAL ENCOUNTER: Primary | ICD-10-CM

## 2024-08-27 DIAGNOSIS — M48.50XA VERTEBRAL COMPRESSION FRACTURE (HCC): ICD-10-CM

## 2024-08-28 NOTE — TELEPHONE ENCOUNTER
Will one of you please call wife with this update to her message yesterday.  Inform wife as we discussed at the last OV will order therapy once I review the imagining and compression fracture stable flexion and extension. I previously reviewed MRI sand xray's identifying compressive fractures and  degenerative spine . If she remains confused schedule a short visit for in office imaging review.  There are no new findings on this current Xray report. She can access the therapy order in My chart today or come into office as she wishes to use [facility] , I will include in order whet we discussed at [facility] OV.     Thank STACY Cooley

## 2024-08-28 NOTE — PATIENT COMMUNICATION
Reached out to patient's spouse Alicia and advised of [facility] response. She was interested in still meeting with her to further discuss and answer all of her questions. This RN assisted in scheduling [facility].     She was appreciative

## 2024-08-29 ENCOUNTER — TELEPHONE (OUTPATIENT)
Age: 74
End: 2024-08-29

## 2024-08-29 NOTE — TELEPHONE ENCOUNTER
8/29/24:    FAXED 8/28/24 PHYSICAL THERAPY ORDER TO [facility].    DR. BARKLEY - [phone]  PHYSICAL THERAPY - [phone]    I CALLED WIFE SHIRLEY BACK TO ADVISE THAT FAX WAS SENT TO DR. BARKLEY AND SHE SAID [facility] ASKED THAT IT BE FAXED TO PT ALSO.

## 2024-08-29 NOTE — TELEPHONE ENCOUNTER
PT'S WIFE SHIRLEY, ON CONSENT, CALLED REQUESTING PT'S PHYSICAL THERAPY REFERRAL BE FAXED TO [facility] -[phone].    THANK YOU

## 2024-09-06 ENCOUNTER — OFFICE VISIT (OUTPATIENT)
Dept: NEUROSURGERY | Facility: CLINIC | Age: 74
End: 2024-09-06
Payer: MEDICARE

## 2024-09-06 VITALS
HEART RATE: 56 BPM | OXYGEN SATURATION: 99 % | BODY MASS INDEX: 22.88 KG/M2 | RESPIRATION RATE: 18 BRPM | WEIGHT: 151 LBS | DIASTOLIC BLOOD PRESSURE: 80 MMHG | HEIGHT: 68 IN | TEMPERATURE: 97.8 F | SYSTOLIC BLOOD PRESSURE: 130 MMHG

## 2024-09-06 DIAGNOSIS — M80.00XA AGE-RELATED OSTEOPOROSIS WITH CURRENT PATHOLOGICAL FRACTURE, INITIAL ENCOUNTER: Primary | ICD-10-CM

## 2024-09-06 PROCEDURE — 99214 OFFICE O/P EST MOD 30 MIN: CPT | Performed by: NURSE PRACTITIONER

## 2024-09-06 NOTE — PROGRESS NOTES
Assessment/Plan:    Age-related osteoporosis with current pathological fracture  As addressed in HPI  Wife continues as always to actively participate in visits,  is passive participant, he just listens.  Wife appears to make all healthcare decisions and diuretics all conversation with healthcare provider.  Patient complained of bilateral low back pain after sitting in the office.  Patient admits the day prior he was out for a very long walk at which time he complained of back pain.  Advised patient to stand during the office visit and change position at which time patient reported relief of the back pain.  Midline palpation of thoracicolumbar spine patient denies pain.  Nonfocal neurologic examination, no ambulation deficit observed.  Intact motor function in all extremities.  Wife reports patient has started aqua therapy at [facility], she plans to start physical therapy after completion.      Imaging  8/16/2024 x-ray lumbar spine complete with bending minimum 6 views--There are stable multilevel compression deformities involving the T12, L1, and L5 vertebral bodies. The patient is status post vertebroplasty at T12 and L5. There is also mild loss of height of the L2 vertebral body, stable since prior CT from 7/12/2024.There is retrolisthesis of L2-L3.          Plan  Reviewed CT 7/12/2024 imaging and x-ray lumbar spine 8/16/2024 with patient and wife, demonstrates a very mild loss of height of L2 vertebral body appeared chronic, and was never addressed in subsequent imaging until recent x-ray lumbar spine 6 views completed on 8/16/2024.  Reassured wife and patient this is not an acute compression fracture and no further protocol was indicated for management.  Reassured it is safe to start physical therapy, the L2 fracture is not acute.  Reinforced with wife as previously reviewed MRI of the lumbar spine does demonstrate degenerative changes in the disc as well as foraminal narrowing which  can contribute to patient's complaints of pain.  Advised patient of red flag signs of recurrent compression fracture and at any time if patient complains of severe back pain limiting physical activity he should present in any emergency department for assessment of new or acute on chronic compression fractures and given his diagnosis of osteoporosis.  Reinforced with wife patient is not optimized on osteoporotic medication, he recently started Prolia and has received only 1 injection with a second injection pending in a few weeks.  Wife admits patient continues taking calcium and vitamin D.  Reinforced with wife to gradually increase activity restrictions and to always be aware of patient's bone health and the risk for recurrent fractures and the need to avoid aggressive physical activity such as lifting more than 10 pounds, physical activity of pushing pulling stretching bending as the patient remains at risk for recurrent compression fractures.  Recommend patient have a discussion with Dr. Powers regarding the integration of weightbearing exercises as soon as possible for the management of osteoporosis and to consider LIFTMOR TRIAL exercises for osteoporosis.  Advised if she has additional questions or concerns to notify the neurosurgery office  Advised to return to office as needed.       Age-related osteoporosis with current pathological fracture, initial encounter      Subjective: Wife requested a follow-up appointment to discuss findings on recent x-ray not previously reported.    Patient ID: Jose Raza is a 74 y.o. male PM/SH; as addressed in designated section below     HPI   Patient is well-known to neurosurgery for compression fractures and status post kyphoplasty on 4/23/2024 of T12 and L4 performed by Dr. Putnam. . Patient initially consulted with neurosurgery 3/25/2024 for age-related osteoporosis with current pathological compression fractures of T12, L1, and L5. Patient has a history of  osteoporosis and is treating with Prolia.  Approximately 1 month after kyphoplasty patient rated reported new onset back pain, and MRI 5/13/2024 of the lumbar spine was completed that demonstrated a new recent injury of superimposed on subacute chronic L1 compression fracture with progression of height loss, diffuse marrow edema and mild retropulsion compared to prior MRI examination.  At appointment 6/7/2024 MRI lumbar spine results reviewed in care recommendation was to resume use of TLSO brace and lumbar spine precautions.  The wife was reluctant and very resistive to patient resuming use of brace and activity restrictions, instead she wanted patient to start physical therapy.  Wife eventually agreed to recommendations and care continued as per protocol for the management of compression fracture.  During 8/16/2024 visit flexion and extension was performed in the office without adverse effect and then ordered x-ray lumbar spine flexion and extension 6 views.  Explained protocol to wife once cleared on imaging will start brace weaning and order physical therapy.  Wife requested in addition to physical therapy to order aqua therapy as well, and she will arrange for services under Dr. Powers at [facility].  Wife telephoned office after initial visit with Dr. Powers requesting clarification of x-ray result that addressed mild loss of height of L2 vertebral body, stable since prior CT 7/12/2024 for renal stone study abdomen and pelvis without.  A review of CT report does not address this L2 finding but is seen on imaging reviewed..    REVIEW OF SYSTEMS  Review of Systems   Constitutional: Negative.    HENT: Negative.     Eyes: Negative.    Respiratory: Negative.     Cardiovascular: Negative.    Gastrointestinal: Negative.    Endocrine: Negative.    Genitourinary: Negative.    Musculoskeletal:  Negative for back pain, gait problem and myalgias.   Skin: Negative.    Allergic/Immunologic: Negative.     Neurological:  Negative for weakness and numbness.   Hematological: Negative.    Psychiatric/Behavioral: Negative.           Meds/Allergies     Current Outpatient Medications   Medication Sig Dispense Refill    Calcium Carb-Cholecalciferol (Calcium 1000 + D) 1000-20 MG-MCG TABS Take 1 tablet by mouth 2 (two) times a day      Creon 40060-43455 units capsule 3 (three) times a day with meals      cycloSPORINE (Restasis) 0.05 % ophthalmic emulsion Administer 1 drop to both eyes every 12 (twelve) hours      denosumab (PROLIA) 60 mg/mL Inject under the skin once      Fructooligosaccharides-Inulin (Prebiotic Inulin-FOS) POWD daily      MAGNESIUM CARBONATE PO Take 1 tablet by mouth if needed      Magnesium Citrate (CITROMA PO) if needed As directed      Multiple Vitamin (Multi Vitamin Daily) TABS Take by mouth      NON FORMULARY daily Triple magnesium (glycinate 10%, oxide 60%, malate 30%,)      Omega-3 Fatty Acids (fish oil) 1,000 mg Take 2,000 mg by mouth daily      patient supplied medication Take 1 each by mouth daily One probiotic daily      Pyridoxine HCl (Vitamin B6) 100 MG TABS in the morning      tamsulosin (FLOMAX) 0.4 mg Take 1 capsule (0.4 mg total) by mouth daily with dinner 90 capsule 3    Acetylcysteine (NAC) 600 MG CAPS daily      Multiple Vitamins-Iron (CHLORELLA PO) daily (Patient not taking: Reported on 7/12/2024)       No current facility-administered medications for this visit.       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    GERD (gastroesophageal reflux disease) 03/29/2024    History of kyphoplasty     Kidney stone     Lumbar vertebral fracture (HCC)     Memory loss 03/29/2024    Osteoporosis     Thoracic vertebral fracture (HCC)        Past Surgical History:   Procedure  "Laterality Date    COLONOSCOPY      Within last 5 years    FL RETROGRADE PYELOGRAM  3/29/2024    HERNIA REPAIR      IR KYPHOPLASTY/VERTEBROPLASTY  2024    KNEE SURGERY      RI CYSTO/URETERO W/LITHOTRIPSY &INDWELL STENT INSRT Right 3/29/2024    Procedure: CYSTO USCOPE W/  LASER, &  STENT;  Surgeon: Salvatore Celis MD;  Location: [facility];  Service: Urology    UPPER GASTROINTESTINAL ENDOSCOPY         Social History     Tobacco Use    Smoking status: Former     Current packs/day: 0.00     Types: Cigarettes     Start date:      Quit date: 1975     Years since quittin.7     Passive exposure: Never    Smokeless tobacco: Never    Tobacco comments:     Causal smoker as a young man 51 years ago   Vaping Use    Vaping status: Never Used   Substance Use Topics    Alcohol use: Not Currently    Drug use: Never       Family History   Problem Relation Age of Onset    Brain cancer Mother     Cancer Mother     Aneurysm Father        The following portions of the patient's history were reviewed and updated as appropriate: allergies, current medications, past family history, past medical history, past social history, past surgical history and problem list.      EXAM    Vitals:Blood pressure 130/80, pulse 56, temperature 97.8 °F (36.6 °C), temperature source Temporal, resp. rate 18, height 5' 8\" (1.727 m), weight 68.5 kg (151 lb), SpO2 99%.,Body mass index is 22.96 kg/m².     Physical Exam  Vitals and nursing note reviewed. Exam conducted with a chaperone present (wife).   Constitutional:       General: He is not in acute distress.     Appearance: He is not ill-appearing.   Eyes:      General:         Left eye: No discharge.      Conjunctiva/sclera: Conjunctivae normal.   Pulmonary:      Effort: Pulmonary effort is normal. No respiratory distress.   Musculoskeletal:      Right lower leg: No edema.      Left lower leg: No edema.   Neurological:      General: No focal deficit present.      Mental Status: He is " alert and oriented to person, place, and time.      Motor: Motor strength is normal.     Gait: Gait is intact.   Psychiatric:         Mood and Affect: Mood normal.         Behavior: Behavior normal.         Neurologic Exam     Mental Status   Oriented to person, place, and time.   Level of consciousness: alert    Motor Exam     Strength   Strength 5/5 throughout.     Sensory Exam   Light touch normal.     Gait, Coordination, and Reflexes     Gait  Gait: normal      Imaging Studies  XR spine lumbar complete w bending minimum 6 views    Result Date: 8/26/2024  Narrative: XR SPINE LUMBAR COMPLETE W BENDING MINIMUM 6 VIEWS INDICATION: M80.00XA: Age-related osteoporosis with current pathological fracture, unspecified site, initial encounter for fracture S22.000A: Wedge compression fracture of unspecified thoracic vertebra, initial encounter for closed fracture. COMPARISON: 6/22/2024 FINDINGS: There are stable multilevel compression deformities involving the T12, L1, and L5 vertebral bodies. The patient is status post vertebroplasty at T12 and L5. There is also mild loss of height of the L2 vertebral body, stable since prior CT from 7/12/2024. Five non-rib-bearing lumbar vertebral bodies. There is retrolisthesis of L2-L3. There is multilevel facet arthrosis. Unremarkable soft tissues.     Impression: Stable multilevel compression deformities as described above status post vertebroplasty at T12 and L5. Computerized Assisted Algorithm (CAA) may have been used to analyze all applicable images. Workstation performed: [id number]       I have personally reviewed pertinent reports.   and I have personally reviewed pertinent films in PACS    I have spent a total time of 30 minutes on 09/06/24 in caring for this patient including Diagnostic results, Risks and benefits of tx options, Instructions for management, Patient and family education, Importance of tx compliance, Risk factor reductions, Impressions, Counseling / Coordination  of care, Documenting in the medical record, Reviewing / ordering tests, medicine, procedures  , Obtaining or reviewing history  , and Communicating with other healthcare professionals .     PLEASE NOTE:  This encounter may have been completed utilizing the [employer] Direct Speech Voice Recognition Software. Grammatical errors, random word insertions, pronoun errors and incomplete sentences are occasional consequences of the system due to software limitations, ambient noise and hardware issues.These may be missed even after proof reading prior to affixing electronic signature. Please do not hesitate to contact me directly if you have any questions or concerns about the content, text or information contained within the body of this dictation.

## 2024-09-06 NOTE — ASSESSMENT & PLAN NOTE
As addressed in HPI  Wife continues as always to actively participate in visits,  is passive participant, he just listens.  Wife appears to make all healthcare decisions and diuretics all conversation with healthcare provider.  Patient complained of bilateral low back pain after sitting in the office.  Patient admits the day prior he was out for a very long walk at which time he complained of back pain.  Advised patient to stand during the office visit and change position at which time patient reported relief of the back pain.  Midline palpation of thoracicolumbar spine patient denies pain.  Nonfocal neurologic examination, no ambulation deficit observed.  Intact motor function in all extremities.  Wife reports patient has started aqua therapy at [facility], she plans to start physical therapy after completion.      Imaging  8/16/2024 x-ray lumbar spine complete with bending minimum 6 views--There are stable multilevel compression deformities involving the T12, L1, and L5 vertebral bodies. The patient is status post vertebroplasty at T12 and L5. There is also mild loss of height of the L2 vertebral body, stable since prior CT from 7/12/2024.There is retrolisthesis of L2-L3.          Plan  Reviewed CT 7/12/2024 imaging and x-ray lumbar spine 8/16/2024 with patient and wife, demonstrates a very mild loss of height of L2 vertebral body appeared chronic, and was never addressed in subsequent imaging until recent x-ray lumbar spine 6 views completed on 8/16/2024.  Reassured wife and patient this is not an acute compression fracture and no further protocol was indicated for management.  Reassured it is safe to start physical therapy, the L2 fracture is not acute.  Reinforced with wife as previously reviewed MRI of the lumbar spine does demonstrate degenerative changes in the disc as well as foraminal narrowing which can contribute to patient's complaints of pain.  Advised patient of red flag signs  of recurrent compression fracture and at any time if patient complains of severe back pain limiting physical activity he should present in any emergency department for assessment of new or acute on chronic compression fractures and given his diagnosis of osteoporosis.  Reinforced with wife patient is not optimized on osteoporotic medication, he recently started Prolia and has received only 1 injection with a second injection pending in a few weeks.  Wife admits patient continues taking calcium and vitamin D.  Reinforced with wife to gradually increase activity restrictions and to always be aware of patient's bone health and the risk for recurrent fractures and the need to avoid aggressive physical activity such as lifting more than 10 pounds, physical activity of pushing pulling stretching bending as the patient remains at risk for recurrent compression fractures.  Recommend patient have a discussion with Dr. Powers regarding the integration of weightbearing exercises as soon as possible for the management of osteoporosis and to consider LIFTMOR TRIAL exercises for osteoporosis.  Advised if she has additional questions or concerns to notify the neurosurgery office  Advised to return to office as needed.

## 2024-09-11 ENCOUNTER — APPOINTMENT (OUTPATIENT)
Dept: LAB | Age: 74
End: 2024-09-11
Payer: MEDICARE

## 2024-09-11 ENCOUNTER — CONSULT (OUTPATIENT)
Dept: NEPHROLOGY | Facility: CLINIC | Age: 74
End: 2024-09-11
Payer: MEDICARE

## 2024-09-11 VITALS
DIASTOLIC BLOOD PRESSURE: 72 MMHG | WEIGHT: 154 LBS | SYSTOLIC BLOOD PRESSURE: 112 MMHG | BODY MASS INDEX: 23.34 KG/M2 | HEIGHT: 68 IN

## 2024-09-11 DIAGNOSIS — N20.0 NEPHROLITHIASIS: ICD-10-CM

## 2024-09-11 DIAGNOSIS — N18.2 CKD (CHRONIC KIDNEY DISEASE) STAGE 2, GFR 60-89 ML/MIN: ICD-10-CM

## 2024-09-11 DIAGNOSIS — Z91.89 AT RISK FOR NUTRITION PROBLEM: ICD-10-CM

## 2024-09-11 DIAGNOSIS — R82.992 HYPEROXALURIA: ICD-10-CM

## 2024-09-11 DIAGNOSIS — N20.0 NEPHROLITHIASIS: Primary | ICD-10-CM

## 2024-09-11 LAB
ALBUMIN SERPL BCG-MCNC: 4.5 G/DL (ref 3.5–5)
ANION GAP SERPL CALCULATED.3IONS-SCNC: 7 MMOL/L (ref 4–13)
BACTERIA UR QL AUTO: ABNORMAL /HPF
BILIRUB UR QL STRIP: NEGATIVE
BUN SERPL-MCNC: 13 MG/DL (ref 5–25)
CALCIUM SERPL-MCNC: 9.3 MG/DL (ref 8.4–10.2)
CHLORIDE SERPL-SCNC: 102 MMOL/L (ref 96–108)
CLARITY UR: CLEAR
CO2 SERPL-SCNC: 29 MMOL/L (ref 21–32)
COLOR UR: ABNORMAL
CREAT SERPL-MCNC: 0.61 MG/DL (ref 0.6–1.3)
CREAT UR-MCNC: 110.2 MG/DL
GFR SERPL CREATININE-BSD FRML MDRD: 99 ML/MIN/1.73SQ M
GLUCOSE SERPL-MCNC: 67 MG/DL (ref 65–140)
GLUCOSE UR STRIP-MCNC: NEGATIVE MG/DL
HGB UR QL STRIP.AUTO: NEGATIVE
KETONES UR STRIP-MCNC: NEGATIVE MG/DL
LEUKOCYTE ESTERASE UR QL STRIP: NEGATIVE
MICROALBUMIN UR-MCNC: 12.9 MG/L
MICROALBUMIN/CREAT 24H UR: 12 MG/G CREATININE (ref 0–30)
MUCOUS THREADS UR QL AUTO: ABNORMAL
NITRITE UR QL STRIP: NEGATIVE
NON-SQ EPI CELLS URNS QL MICRO: ABNORMAL /HPF
PH UR STRIP.AUTO: 6.5 [PH]
PHOSPHATE SERPL-MCNC: 3.7 MG/DL (ref 2.3–4.1)
POTASSIUM SERPL-SCNC: 4.6 MMOL/L (ref 3.5–5.3)
PROT UR STRIP-MCNC: ABNORMAL MG/DL
RBC #/AREA URNS AUTO: ABNORMAL /HPF
SODIUM SERPL-SCNC: 138 MMOL/L (ref 135–147)
SP GR UR STRIP.AUTO: 1.02 (ref 1–1.03)
UROBILINOGEN UR STRIP-ACNC: <2 MG/DL
WBC #/AREA URNS AUTO: ABNORMAL /HPF

## 2024-09-11 PROCEDURE — 81001 URINALYSIS AUTO W/SCOPE: CPT

## 2024-09-11 PROCEDURE — 82570 ASSAY OF URINE CREATININE: CPT

## 2024-09-11 PROCEDURE — 36415 COLL VENOUS BLD VENIPUNCTURE: CPT

## 2024-09-11 PROCEDURE — 80069 RENAL FUNCTION PANEL: CPT

## 2024-09-11 PROCEDURE — 82043 UR ALBUMIN QUANTITATIVE: CPT

## 2024-09-11 PROCEDURE — 99204 OFFICE O/P NEW MOD 45 MIN: CPT | Performed by: INTERNAL MEDICINE

## 2024-09-11 NOTE — ASSESSMENT & PLAN NOTE
-Advised appropriate diet handouts provided  -Recheck 24-hour urine prior to next visit.  Orders:    Renal function panel; Future    Stone risk profile; Future    Renal function panel; Future    Urinalysis with microscopic; Future    Albumin / creatinine urine ratio; Future

## 2024-09-11 NOTE — ASSESSMENT & PLAN NOTE
-Most recent creatinine at 0.88 mg/dL from 4/5/2024.  Check blood work after the visit and prior to next visit  - Discussed with the patient that the most common types of kidney stones are calcium oxalate stones.   - Advised to follow a diet with increased fluid intake so that urine output is greater than 2-2.5L/day.  - Advised patient to decrease salt, protein and oxalate intake.   - Dietary handouts given.  - Ordered for 24 hr stone workup analysis prior to next visit after following the diet listed  - advised patient to call 2 weeks after it is completed to review and discuss the results  -I PTH, uric acid within normal limits  -Continue urology follow-up last seen by urology 7/12/2024 notes reviewed  Orders:    Ambulatory Referral to Nephrology    Renal function panel; Future    Stone risk profile; Future    Renal function panel; Future    Urinalysis with microscopic; Future    Albumin / creatinine urine ratio; Future

## 2024-09-11 NOTE — PROGRESS NOTES
Ambulatory Visit  Name: Jose Raza      : 1950      MRN: [id number]  Encounter Provider: Ailin Sharma MD  Encounter Date: 2024   Encounter department: [facility]    Assessment & Plan  Nephrolithiasis  -Most recent creatinine at 0.88 mg/dL from 2024.  Check blood work after the visit and prior to next visit  - Discussed with the patient that the most common types of kidney stones are calcium oxalate stones.   - Advised to follow a diet with increased fluid intake so that urine output is greater than 2-2.5L/day.  - Advised patient to decrease salt, protein and oxalate intake.   - Dietary handouts given.  - Ordered for 24 hr stone workup analysis prior to next visit after following the diet listed  - advised patient to call 2 weeks after it is completed to review and discuss the results  -I PTH, uric acid within normal limits  -Continue urology follow-up last seen by urology 2024 notes reviewed  Orders:    Ambulatory Referral to Nephrology    Renal function panel; Future    Stone risk profile; Future    Renal function panel; Future    Urinalysis with microscopic; Future    Albumin / creatinine urine ratio; Future    CKD (chronic kidney disease) stage 2, GFR 60-89 ml/min  Lab Results   Component Value Date    EGFR 85 2024    EGFR 95 2024    EGFR 94 2024    CREATININE 0.88 2024    CREATININE 0.68 2024    CREATININE 0.69 2024   - Recommend to avoid use of NSAIDs, nephrotoxins. Caution advised with regards to exposure to IV contrast dye.   - Discussed with the patient in depth his renal status, including the possible etiologies for CKD.   - Advised the patient that when his GFR is close to 20mL/min then will start discussing about RRT(renal replacement therapy) options such as renal transplant, peritoneal dialysis and hemodialysis.   - Informed the patient about the various options for Renal Replacement therapy.  - Discussed  with the patient how we need to work together to delay the progression of CKD with optimal BP control based on their age and co-morbidities, and trying to reduce proteinuria by the use of anti-proteinuric agents.     Orders:    Renal function panel; Future    Stone risk profile; Future    Renal function panel; Future    Urinalysis with microscopic; Future    Albumin / creatinine urine ratio; Future    Hyperoxaluria  -Advised appropriate diet handouts provided  -Recheck 24-hour urine prior to next visit.  Orders:    Renal function panel; Future    Stone risk profile; Future    Renal function panel; Future    Urinalysis with microscopic; Future    Albumin / creatinine urine ratio; Future    At risk for nutrition problem  -Dietary recommendations for reducing risk for nephrolithiasis discussed in depth, handouts provided.  -Advised of low potassium diet  - Encouraged patient to follow a renal diet comprising of moderate potassium, low phosphorus and protein restriction to 0.8gm/kg.  - Will check serum albumin with next blood work.   Orders:    Renal function panel; Future    Stone risk profile; Future    Renal function panel; Future    Urinalysis with microscopic; Future    Albumin / creatinine urine ratio; Future      History of Present Illness     Jose Raza is a 74 y.o. male presents to the office for evaluation and management of nephrolithiasis.  Review of record shows baseline creatinine 0.6 to 0.9 mg/dL dating as far back as 2019 most recent creatinine at 0.88 mg/dL last 24 urine from May 2024 showing good urine volumes however elevated urine oxalate levels seen by urology status post uteroscopy 3/29/2024. First stone episode in jan 2024 and that is the most recent episode. Needed ureteroscopy. So drinks about 50oz of water a day. Does consume high oxalate diet. Is following a gluten free diet. Presents with spouse who helps with the history.  No history of MARKIE needing dialysis, never seen a nephrologist  before thankful for the care information that is gone today.  Does consume a lot of water.  No NSAID use.  Recently also diagnosed with fractures in his back and undergoing therapy.  He is on multiple vitamins.    History obtained from : patient and patient's Significant Other  Review of Systems   Constitutional:  Negative for chills and fever.   HENT:  Negative for congestion.    Respiratory:  Negative for cough and shortness of breath.    Cardiovascular:  Negative for leg swelling.   Gastrointestinal:  Positive for constipation. Negative for diarrhea.   Genitourinary:  Negative for dysuria and hematuria.   Musculoskeletal:  Negative for back pain.   Neurological:  Negative for dizziness, light-headedness and headaches.   Psychiatric/Behavioral:  Negative for agitation and confusion.    All other systems reviewed and are negative.    Pertinent Medical History   CKD : Review of record shows patient has a baseline creatinine of 0.6-0.9 mg/dl dating as far back as 2019.  Etiology of CKD likely secondary to nephrolithiasis plus age-related nephron loss.  24 urine from 5/20/2024 showing urine volumes 2 L, elevated urine oxalate levels.  Renal imaging: CT renal protocol from 7/12/2024 showing interval right lithotripsy demonstrated by a cluster of nonobstructing distal right ureteral stone just above the UVJ measuring up to 5 mm no hydronephrosis additional nonobstructing right renal calculi measuring up to 12 mm no left renal stones no bladder stones.  CKD education: NA  Blood pressure: Current medications include   Proteinuria: Need to evaluate  Follow-up: Patient is to follow-up in 6 months, with lab work to be performed after the visit and then again a few days prior to the next visit. Advised patient to call me in 10 days to review the results if they do not hear back from me, as I may have not received the results.          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    Took famotidine for years    Compression fracture of body of thoracic vertebra (HCC) 02/2024    T12, L5, L1    Marleni Marcelo virus infection 03/09/2024    GERD (gastroesophageal reflux disease) 03/29/2024    History of kyphoplasty     Kidney stone     Lumbar vertebral fracture (HCC)     Memory loss 03/29/2024    Osteoporosis     Thoracic vertebral fracture (HCC)      Past Surgical History:   Procedure Laterality Date    COLONOSCOPY      Within last 5 years    FL RETROGRADE PYELOGRAM  3/29/2024    HERNIA REPAIR      IR KYPHOPLASTY/VERTEBROPLASTY  4/23/2024    KNEE SURGERY      MD CYSTO/URETERO W/LITHOTRIPSY &INDWELL STENT INSRT Right 3/29/2024    Procedure: CYSTO USCOPE W/  LASER, &  STENT;  Surgeon: Salvatore Celis MD;  Location: [facility];  Service: Urology    UPPER GASTROINTESTINAL ENDOSCOPY       Family History   Problem Relation Age of Onset    Brain cancer Mother     Cancer Mother     Aneurysm Father      Current Outpatient Medications on File Prior to Visit   Medication Sig Dispense Refill    Acetylcysteine (NAC) 600 MG CAPS daily      Calcium Carb-Cholecalciferol (Calcium 1000 + D) 1000-20 MG-MCG TABS Take 1 tablet by mouth 2 (two) times a day      Creon 40863-37741 units capsule 3 (three) times a day with meals      cycloSPORINE (Restasis) 0.05 % ophthalmic emulsion Administer 1 drop to both eyes every 12 (twelve) hours      denosumab (PROLIA) 60 mg/mL Inject under the skin once      Fructooligosaccharides-Inulin (Prebiotic Inulin-FOS) POWD daily      MAGNESIUM CARBONATE PO Take 1 tablet by mouth if needed      Magnesium Citrate (CITROMA PO) if needed As directed      Multiple Vitamin (Multi Vitamin Daily) TABS Take by mouth      NON FORMULARY daily Triple magnesium (glycinate 10%, oxide 60%, malate 30%,)      Omega-3 Fatty Acids (fish oil) 1,000 mg Take 2,000 mg by mouth daily      patient supplied medication Take 1 each by mouth daily One probiotic daily      Pyridoxine HCl (Vitamin B6)  100 MG TABS in the morning      tamsulosin (FLOMAX) 0.4 mg Take 1 capsule (0.4 mg total) by mouth daily with dinner 90 capsule 3    [DISCONTINUED] Multiple Vitamins-Iron (CHLORELLA PO) daily       No current facility-administered medications on file prior to visit.   No Known Allergies   Current Outpatient Medications on File Prior to Visit   Medication Sig Dispense Refill    Acetylcysteine (NAC) 600 MG CAPS daily      Calcium Carb-Cholecalciferol (Calcium 1000 + D) 1000-20 MG-MCG TABS Take 1 tablet by mouth 2 (two) times a day      Creon [id number] units capsule 3 (three) times a day with meals      cycloSPORINE (Restasis) 0.05 % ophthalmic emulsion Administer 1 drop to both eyes every 12 (twelve) hours      denosumab (PROLIA) 60 mg/mL Inject under the skin once      Fructooligosaccharides-Inulin (Prebiotic Inulin-FOS) POWD daily      MAGNESIUM CARBONATE PO Take 1 tablet by mouth if needed      Magnesium Citrate (CITROMA PO) if needed As directed      Multiple Vitamin (Multi Vitamin Daily) TABS Take by mouth      NON FORMULARY daily Triple magnesium (glycinate 10%, oxide 60%, malate 30%,)      Omega-3 Fatty Acids (fish oil) 1,000 mg Take 2,000 mg by mouth daily      patient supplied medication Take 1 each by mouth daily One probiotic daily      Pyridoxine HCl (Vitamin B6) 100 MG TABS in the morning      tamsulosin (FLOMAX) 0.4 mg Take 1 capsule (0.4 mg total) by mouth daily with dinner 90 capsule 3    [DISCONTINUED] Multiple Vitamins-Iron (CHLORELLA PO) daily       No current facility-administered medications on file prior to visit.      Social History     Tobacco Use    Smoking status: Former     Current packs/day: 0.00     Types: Cigarettes     Start date:      Quit date: 1975     Years since quittin.7     Passive exposure: Never    Smokeless tobacco: Never    Tobacco comments:     Causal smoker as a young man 51 years ago   Vaping Use    Vaping status: Never Used   Substance and Sexual Activity     "Alcohol use: Not Currently    Drug use: Never    Sexual activity: Yes     Partners: Female         Objective     /72 (BP Location: Left arm, Patient Position: Sitting, Cuff Size: Adult)   Ht 5' 8\" (1.727 m)   Wt 69.9 kg (154 lb)   BMI 23.42 kg/m²     Physical Exam  Vitals reviewed.   Constitutional:       General: He is not in acute distress.     Appearance: Normal appearance. He is normal weight. He is not ill-appearing, toxic-appearing or diaphoretic.   HENT:      Head: Normocephalic and atraumatic.      Mouth/Throat:      Mouth: Mucous membranes are moist.      Pharynx: No oropharyngeal exudate.   Eyes:      General: No scleral icterus.  Cardiovascular:      Rate and Rhythm: Normal rate.   Pulmonary:      Effort: No respiratory distress.      Breath sounds: Normal breath sounds. No stridor. No wheezing.   Abdominal:      Palpations: There is no mass.      Tenderness: There is no abdominal tenderness. There is no right CVA tenderness or left CVA tenderness.   Musculoskeletal:         General: No swelling.      Cervical back: Normal range of motion. No rigidity.   Skin:     Coloration: Skin is not jaundiced.   Neurological:      General: No focal deficit present.      Mental Status: He is alert and oriented to person, place, and time.   Psychiatric:         Mood and Affect: Mood normal.         Behavior: Behavior normal.           Ailin Sharma MD, FASN, 9/11/2024, 8:42 AM      "

## 2024-09-11 NOTE — PATIENT INSTRUCTIONS
"- get blood work after the visit  - follow stone diet below and then get 24 hr urine in 3 months    - Please call me in 10 days after having your blood work done to review the results if you do not hear back from me or my office, as I may have not received the results.  - please remember to perform blood work prior to the next visit.  - Please call if the blood pressure top number is greater than 140 or less than 110 consistently.  - Please call if you are gaining more than 2lbs in 2 days for adjustment of water pills.  ~ Please AVOID the following pain medications.  LIST OF NSAIDS (NONSTEROIDAL ANTI-INFLAMMATORY DRUGS) AND MILLER-2 INHIBITORS    DIFLUNISAL (DOLOBID)  IBUPROFEN (MOTRIN, ADVIL)  FLURBIPROFEN (ANSAID)  KETOPROFEN (ORUDIS, ORUVAIL)  FENOPROFEN (NALFON)  NABUMETONE (RELAFEN)  PIROXICAM (FELDENE)  NAPROXEN (ALEVE, NAPROSYN, NAPRELAN, ANAPROX)  DICLOFENAC (VOLTAREN, CATAFLAM)  INDOMETHACIN (INDOCIN)  SULINDAC (CLINORIL)  TOLMETIN (TOLETIN)  ETODOLAC (LODINE)  MELOXICAM (MOBIC)  KETOROLAC (TORADOL)  OXAPROZIN (DAYPRO)  CELECOXIB (CELEBREX)    Kidney Stone Prevention    Fluid Intake    A high fluid intake is one of the most important cornerstones of kidney stone dietary prevention. A sufficiently dilute urine will prevent the individual chemical components of stones from becoming concentrated enough to precipitate out of solution, keeping them instead in their dissolved state. A high urine output also may reduce stone from forming through \"flushing\" out of stone components and prevention of urine stagnation. In addition to stone benefits, increased water intake has been shown to have a multitude of other benefits, including improved alertness, better skin appearance, enhanced physical performance, reduced constipation and enhanced weight loss.    The average daily urine output for normal healthy adults is 1.2 liters a day, ranging from 1 to 2 liters in most individuals and varying with body weight and gender. " In stone formers, however, a higher daily urine output is required for stone prevention and achieving a daily volume of at least 2.0 to 2.5 liters a day can significantly reduce the recurrence of future stones. In a randomized study of stone formers who were given specific instructions to increase their fluid intake compared to stone formers told to not change their diet, those given specific fluid instructions achieved a high urine output of 2.6 liters a day versus 1.0 liters a day in those not given dietary instructions. Over a period of five years, the high fluid intake group was half as likely to form new stones as compared to the normal fluid intake group (Benigno et al, J Urol 1996).    We recommend that most stone formers increase their daily fluid intake by one liter (an additional two 16 oz water bottles or two tall glasses a day) in order to achieve a urine output of 2.5 liters a day. (One liter = 4.2 8-oz glasses or 34 oz). Alternatively, a 24 hour urine collection can be performed to guide fluid intake to achieve 2.5 liters of urine output in a specific patient.    Type of Fluid Intake    The type of fluid intake is generally less important than the total intake. While drinking water is our preferred recommendation because it is inexpensive and contains no calories, for stone patients who do not enjoy drinking water, any beverage will be beneficial in reducing stone risk.    Contrary to popular belief, studies have found that an increased intake of tea, coffee, and alcoholic beverage actually reduces the risk of stones, possibly because of an associated increase in urine output (Tanesha et al, Am J of Epidemiology, 1996). While tea contains high levels of oxalate, this does not appear to result in increased stone formation for the reasons discussed below in discussion on oxalate.    Soda intake (including frantz) and milk intake also do not appear to increase the risk of stones.    Citrus Fruit  Juices    Citrus juices, including lemon juice and orange juice, contain citrate, which acts as a stone inhibitor for calcium based stones. Citrate seems to do this by binding calcium, making it unavailable to combine with oxalate or phosphate: a necessary first step in the formation of stones. Citrate also seems to make it more difficult for stones to grow once they've formed.    Drinking citrus juice in the form of concentrated lemon juice mixed with water has been shown to effectively increase urinary citrate levels and reduce urinary calcium levels, both of which will reduce stone risk. Orange juice will similarly increase urinary citrate levels. However, orange juice appears to also increase urinary oxalate levels ( a stone promoter). Other sources of citrate, including grapefruit juice, have had less research completed confirming their beneficial effects on urinary citrate levels. Therefore, lemon juice is typically favored over the other citrus juices as a natural method to increase urinary citrate levels. Many patients find drinking citrus juices to be an attractive alternative to pharmaceutical treatment with potassium citrate.    We recommend that stone formers consider supplementing their daily fluid intake with a mixture of 60 ml of concentrated lemon juice in one liter of water to increase their urinary citrate levels.    Salt    A high sodium intake increases the risk of stone formation by increasing calcium levels and decreasing citrate (a stone inhibitor) levels in urine. Additionally, high sodium intake will impair the ability of medications such as hydrochlorothiazide to effectively reduce calcium levels in urine. A study of stone formers who were kept on a strict diet with a maximum daily sodium intake of 50 mmol (1200 mg) in addition to a reduced protein diet demonstrated that the low sodium diet was effective in reducing stone recurrence by 50% as compared to the low calcium diet.    We  recommend that stone formers aim to follow the FDA's guideline of limiting salt intake to 2300 mg of sodium a day in the general population and 1500 mg of sodium a day in those with hypertension,  Americans, or middle aged and older adults. 2300 mg is equivalent to about 1 teaspoon of table salt.    The best way to determine the salt content of your food is to read the nutrition label. Processed foods tend to contain higher amounts of salt. Choose low sodium options whenever possible.    1 cup of canned chicken noodle soup contains 870 mg of sodium.  A fried chicken drumstick contains 310 mg of sodium.  A serving of shrimp contains 240 mg of sodium.  2 slices of white bread contains 200 mg of sodium.  15 potato chips contain 180 mg of sodium.  1 container of strawberry yogurt contains 85 mg of sodium.  1 tomato contains 20 mg of sodium.  1 apple contains 0 mg of sodium.    In addition to lowering the risk of stones, a low sodium intake helps to control or prevent high blood pressure, which can lead to heart disease, stroke, heart failure, and kidney disease.    Animal Protein    Animal protein in meat products increases the risk of stone by increasing calcium, oxalate, and uric acid levels in urine. All three of these changes increase the risk of stones. In studies comparing high meat eaters versus low meat eaters, high meat eaters were found to be at increased risk of forming stones. A randomized study of stone formers restricted to a low meat intake of 52 grams a day (equivalent to 8 oz of beef) in combination with sodium restriction found that the combination reduced stone recurrence by 50% compared to calcium restriction alone (Benigno et al, [facility] 2002).    We recommend that most stone formers try to reduce their meat intake to 6 oz a day. This includes all types of meat: beef, pork, poultry, and seafood.    The USDA has recommended a daily allowance of 5-6 oz of protein intake among adults. They also  recommend choosing non-meat protein foods such as nuts and beans instead of meat sources. Protein from non-meat sources does not appear to increase the risk of stones.    A small steak contains about 3-4 oz of protein.  A quarter pound hamburger with cheese contains 4 oz of protein.  A chicken breast contains about 5 oz of protein, a chicken thigh about 2.5 oz, a chicken drumstick about 1.5 oz.  One 5 oz can of tuna contains 5 oz of protein.  1 medium egg contains 1 oz of protein.    Lowering your animal protein intake and eating more fruits and vegetables also benefits your overall health by limiting the amount of saturated fats and cholesterol in your diet. This helps to reduce your risk of cardiovascular disease.    Oxalate    While oxalate plays an important role in the development of calcium oxalate stones, dietary restriction does not appear to be effective in reducing the risk of stones in the majority of patients. About 40% of urinary oxalate comes from dietary sources while the remainder is naturally made within the liver. Therefore, reducing oxalate dietary intake does not always have a significant impact on total urinary oxalate levels.    Oxalate is found in many vegetable and fruits, including many healthy dietary choices often making it difficult to achieve a low oxalate diet. Because of these issues, oxalate avoidance is beneficial primarily in those individuals with specific abnormalities that lead to high oxalate urinary levels.    We recommend that most stone formers should maintain a normal oxalate intake without the need for oxalate restriction. High oxalate intake should be avoided in individuals found to have high urinary oxalate levels on metabolic evaluation. Oxalate restriction may be beneficial in certain individuals with high urinary oxalate levels.    Oxalate Rich Foods    Tea (black)  Spinach  Mustard Greens  Chard  Beets  Rhubarb  Okra  Berries  Chocolate  Nuts  Sweet  Potatoes        Calcium    Kidney Stone formers often question whether to stop or reduce their calcium intake. Despite the fact that calcium is a major component of 75% of stones, excessive calcium intake is vary rarely the cause of stone formation. In fact, several studies have shown that restricting calcium intake in most stone formers actually increases the number of stones they develop. This appears to happen because when less calcium is ingested, it becomes easier for oxalate (which normally binds with calcium in the gut) to be absorbed. Higher levels of oxalate in the urine then lead to an increase in stone risk. Calcium obtained from dietary sources appears to be better than calcium from supplements in regards to lowering stone risk because supplements can actually increase your risk of stones slightly (by 17%) while dietary calcium intake is instead associated with lower stone risk. If you need to take supplements, taking them during meals appear to be better in terms of stone risk.    We recommend that stone formers maintain a normal calcium intake, preferably from dietary sources. Female stone formers taking calcium supplementation to prevent osteoporosis experience a slightly increased risk of stones (17%) which needs to be balanced against their risk of osteoporosis.

## 2024-09-11 NOTE — ASSESSMENT & PLAN NOTE
-Dietary recommendations for reducing risk for nephrolithiasis discussed in depth, handouts provided.  -Advised of low potassium diet  - Encouraged patient to follow a renal diet comprising of moderate potassium, low phosphorus and protein restriction to 0.8gm/kg.  - Will check serum albumin with next blood work.   Orders:    Renal function panel; Future    Stone risk profile; Future    Renal function panel; Future    Urinalysis with microscopic; Future    Albumin / creatinine urine ratio; Future

## 2024-09-11 NOTE — ASSESSMENT & PLAN NOTE
Lab Results   Component Value Date    EGFR 85 04/05/2024    EGFR 95 03/18/2024    EGFR 94 02/05/2024    CREATININE 0.88 04/05/2024    CREATININE 0.68 03/18/2024    CREATININE 0.69 02/05/2024   - Recommend to avoid use of NSAIDs, nephrotoxins. Caution advised with regards to exposure to IV contrast dye.   - Discussed with the patient in depth his renal status, including the possible etiologies for CKD.   - Advised the patient that when his GFR is close to 20mL/min then will start discussing about RRT(renal replacement therapy) options such as renal transplant, peritoneal dialysis and hemodialysis.   - Informed the patient about the various options for Renal Replacement therapy.  - Discussed with the patient how we need to work together to delay the progression of CKD with optimal BP control based on their age and co-morbidities, and trying to reduce proteinuria by the use of anti-proteinuric agents.     Orders:    Renal function panel; Future    Stone risk profile; Future    Renal function panel; Future    Urinalysis with microscopic; Future    Albumin / creatinine urine ratio; Future

## 2024-10-14 ENCOUNTER — PATIENT MESSAGE (OUTPATIENT)
Dept: INTERNAL MEDICINE CLINIC | Facility: CLINIC | Age: 74
End: 2024-10-14

## 2024-10-15 DIAGNOSIS — R41.3 MEMORY LOSS: ICD-10-CM

## 2024-10-15 DIAGNOSIS — R41.89 COGNITIVE IMPAIRMENT: Primary | ICD-10-CM

## 2024-10-15 NOTE — PATIENT COMMUNICATION
Patients spouse called in stating she will reply back to this message with fax number for [facility] for the speech therapy referral to be sent to them. NFA needed at this time.

## 2024-10-16 ENCOUNTER — TELEPHONE (OUTPATIENT)
Dept: INTERNAL MEDICINE CLINIC | Facility: CLINIC | Age: 74
End: 2024-10-16

## 2024-10-17 ENCOUNTER — TELEPHONE (OUTPATIENT)
Dept: INTERNAL MEDICINE CLINIC | Facility: CLINIC | Age: 74
End: 2024-10-17

## 2024-11-14 ENCOUNTER — APPOINTMENT (OUTPATIENT)
Dept: LAB | Age: 74
End: 2024-11-14
Payer: MEDICARE

## 2024-11-14 DIAGNOSIS — M81.8 OTHER OSTEOPOROSIS WITHOUT CURRENT PATHOLOGICAL FRACTURE: ICD-10-CM

## 2024-11-14 LAB
ANION GAP SERPL CALCULATED.3IONS-SCNC: 6 MMOL/L (ref 4–13)
BUN SERPL-MCNC: 16 MG/DL (ref 5–25)
CALCIUM SERPL-MCNC: 9.7 MG/DL (ref 8.4–10.2)
CHLORIDE SERPL-SCNC: 102 MMOL/L (ref 96–108)
CO2 SERPL-SCNC: 30 MMOL/L (ref 21–32)
CREAT SERPL-MCNC: 0.74 MG/DL (ref 0.6–1.3)
GFR SERPL CREATININE-BSD FRML MDRD: 91 ML/MIN/1.73SQ M
GLUCOSE SERPL-MCNC: 70 MG/DL (ref 65–140)
POTASSIUM SERPL-SCNC: 4.5 MMOL/L (ref 3.5–5.3)
SODIUM SERPL-SCNC: 138 MMOL/L (ref 135–147)

## 2024-11-14 PROCEDURE — 80048 BASIC METABOLIC PNL TOTAL CA: CPT

## 2024-11-14 PROCEDURE — 36415 COLL VENOUS BLD VENIPUNCTURE: CPT

## 2024-11-20 ENCOUNTER — OFFICE VISIT (OUTPATIENT)
Dept: UROLOGY | Facility: MEDICAL CENTER | Age: 74
End: 2024-11-20
Payer: MEDICARE

## 2024-11-20 VITALS
HEART RATE: 51 BPM | SYSTOLIC BLOOD PRESSURE: 120 MMHG | OXYGEN SATURATION: 98 % | DIASTOLIC BLOOD PRESSURE: 80 MMHG | HEIGHT: 68 IN | WEIGHT: 153 LBS | BODY MASS INDEX: 23.19 KG/M2

## 2024-11-20 DIAGNOSIS — N20.0 MULTIPLE KIDNEY STONES: Primary | ICD-10-CM

## 2024-11-20 DIAGNOSIS — N13.30 HYDRONEPHROSIS OF RIGHT KIDNEY: ICD-10-CM

## 2024-11-20 DIAGNOSIS — Z12.5 SCREENING FOR PROSTATE CANCER: ICD-10-CM

## 2024-11-20 PROCEDURE — 99214 OFFICE O/P EST MOD 30 MIN: CPT | Performed by: UROLOGY

## 2024-11-20 NOTE — H&P
H&P Exam - Urology   Jose Raza 74 y.o. male MRN: [id number]  Unit/Bed#:  Encounter: [id number]    Assessment & Plan    Assessment:  Right renal stones  Right hydronephrosis  Prostate cancer screening-MARYANA and PSA normal, due for repeat February 2025  Plan:  Cystoscopy right retrograde pyelogram right ureteroscopic laser lithotripsy stent insertion    History of Present Illness  HPI:  Jose Raza is a 74 y.o. male who presents with a history of a very large renal pelvic stone which was addressed using ureteroscopic laser lithotripsy in March 2024.  Follow-up CT in July 2024 identified fragments in the lower and upper poles of the kidney with persistent hydronephrosis but no evidence of the 2.3 cm renal pelvic calculus which was addressed at the original surgery.  The patient will now present for cystoscopy and retrograde pyelography to see if there is any primary obstruction in the upper urinary tract on the right and then for ureteroscopic laser lithotripsy to try to clean up the remnants of the stone in the upper or lower pole.  The patient understands potential for bleeding infection need for stent possible urinary retention possible need for catheter or other operative interventions possible contracture perforation or stricture.  Retained stone or stone fragments were also noted.  The patient expressed understanding and provided verbal and signed informed consent.  CT and COM P will be performed prior.    Review of Systems   All other systems reviewed and are negative.     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    GERD (gastroesophageal reflux disease) 03/29/2024    History of kyphoplasty     Kidney stone     Lumbar vertebral fracture (HCC)     Memory loss 03/29/2024    Osteoporosis     Thoracic  "vertebral fracture (HCC)      Past Surgical History:   Procedure Laterality Date    COLONOSCOPY      Within last 5 years    FL RETROGRADE PYELOGRAM  3/29/2024    HERNIA REPAIR      IR KYPHOPLASTY/VERTEBROPLASTY  2024    KNEE SURGERY      [place] CYSTO/URETERO W/LITHOTRIPSY &INDWELL STENT INSRT Right 3/29/2024    Procedure: CYSTO USCOPE W/  LASER, &  STENT;  Surgeon: Salvatore Celis MD;  Location: [facility];  Service: Urology    UPPER GASTROINTESTINAL ENDOSCOPY       Social History  Social History     Substance and Sexual Activity   Alcohol Use Not Currently     Social History     Substance and Sexual Activity   Drug Use Never     Social History     Tobacco Use   Smoking Status Former    Current packs/day: 0.00    Types: Cigarettes    Start date:     Quit date: 1975    Years since quittin.9    Passive exposure: Never   Smokeless Tobacco Never   Tobacco Comments    Causal smoker as a young man 50 years ago     Family History: Family history non-contributory    Meds/Allergies  all medications and allergies reviewed  No Known Allergies    Objective  Vitals: Blood pressure 120/80, pulse (!) 51, height 5' 8\" (1.727 m), weight 69.4 kg (153 lb), SpO2 98%.    [unfilled]    Invasive Devices       Peripheral Intravenous Line  Duration             Peripheral IV 24 Right Antecubital 211 days                    Physical Exam  Vitals reviewed.   Constitutional:       General: He is not in acute distress.     Appearance: Normal appearance. He is normal weight. He is not ill-appearing, toxic-appearing or diaphoretic.   HENT:      Head: Normocephalic and atraumatic.      Nose: Nose normal.      Mouth/Throat:      Mouth: Mucous membranes are moist.   Eyes:      Extraocular Movements: Extraocular movements intact.   Cardiovascular:      Rate and Rhythm: Normal rate and regular rhythm.   Pulmonary:      Effort: Pulmonary effort is normal. No respiratory distress.      Breath sounds: No wheezing, rhonchi or " rales.   Abdominal:      General: Bowel sounds are normal. There is no distension.      Palpations: Abdomen is soft.      Tenderness: There is no abdominal tenderness. There is no guarding or rebound.   Genitourinary:     Penis: Normal.       Testes: Normal.      Prostate: Normal.      Rectum: Normal.   Musculoskeletal:      Cervical back: Normal range of motion and neck supple.   Skin:     General: Skin is dry.   Neurological:      Mental Status: He is alert and oriented to person, place, and time.   Psychiatric:         Mood and Affect: Mood normal.         Behavior: Behavior normal.         Thought Content: Thought content normal.         Judgment: Judgment normal.         Lab Results: I have personally reviewed pertinent reports.    Imaging: Results Review Statement: I personally reviewed the following image studies in PACS and associated radiology reports: CT abdomen/pelvis. My interpretation of the radiology images/reports is: Right renal stones and hydronephrosis.  EKG, Pathology, and Other Studies: Not applicable  VTE Prophylaxis: Sequential compression device (Venodyne)     Code Status: [unfilled]  Advance Directive and Living Will:      Power of :    POLST:      Counseling / Coordination of Care  Total floor / unit time spent today 30 minutes.  Greater than 50% of total time was spent with the patient and / or family counseling and / or coordination of care.  A description of the counseling / coordination of care:  .

## 2024-11-20 NOTE — PROGRESS NOTES
H&P Exam - Urology   Jose Raza 74 y.o. male MRN: [id number]  Unit/Bed#:  Encounter: [id number]    Assessment & Plan     Assessment:  Right renal stones  Right hydronephrosis  Prostate cancer screening-MARYANA and PSA normal, due for repeat February 2025  Plan:  Cystoscopy right retrograde pyelogram right ureteroscopic laser lithotripsy stent insertion    History of Present Illness   HPI:  Jose Raza is a 74 y.o. male who presents with a history of a very large renal pelvic stone which was addressed using ureteroscopic laser lithotripsy in March 2024.  Follow-up CT in July 2024 identified fragments in the lower and upper poles of the kidney with persistent hydronephrosis but no evidence of the 2.3 cm renal pelvic calculus which was addressed at the original surgery.  The patient will now present for cystoscopy and retrograde pyelography to see if there is any primary obstruction in the upper urinary tract on the right and then for ureteroscopic laser lithotripsy to try to clean up the remnants of the stone in the upper or lower pole.  The patient understands potential for bleeding infection need for stent possible urinary retention possible need for catheter or other operative interventions possible contracture perforation or stricture.  Retained stone or stone fragments were also noted.  The patient expressed understanding and provided verbal and signed informed consent.  CT and COM P will be performed prior.    Review of Systems   All other systems reviewed and are negative.     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/2024    GERD (gastroesophageal reflux disease) 03/29/2024    History of kyphoplasty     Kidney stone     Lumbar vertebral fracture (HCC)     Memory loss 03/29/2024    Osteoporosis     Thoracic  "vertebral fracture (HCC)      Past Surgical History:   Procedure Laterality Date    COLONOSCOPY      Within last 5 years    FL RETROGRADE PYELOGRAM  3/29/2024    HERNIA REPAIR      IR KYPHOPLASTY/VERTEBROPLASTY  2024    KNEE SURGERY      [place] CYSTO/URETERO W/LITHOTRIPSY &INDWELL STENT INSRT Right 3/29/2024    Procedure: CYSTO USCOPE W/  LASER, &  STENT;  Surgeon: Salvatore Celis MD;  Location: [facility];  Service: Urology    UPPER GASTROINTESTINAL ENDOSCOPY       Social History   Social History     Substance and Sexual Activity   Alcohol Use Not Currently     Social History     Substance and Sexual Activity   Drug Use Never     Social History     Tobacco Use   Smoking Status Former    Current packs/day: 0.00    Types: Cigarettes    Start date:     Quit date: 1975    Years since quittin.9    Passive exposure: Never   Smokeless Tobacco Never   Tobacco Comments    Causal smoker as a young man 50 years ago     Family History: Family history non-contributory    Meds/Allergies   all medications and allergies reviewed  No Known Allergies    Objective   Vitals: Blood pressure 120/80, pulse (!) 51, height 5' 8\" (1.727 m), weight 69.4 kg (153 lb), SpO2 98%.    [unfilled]    Invasive Devices       Peripheral Intravenous Line  Duration             Peripheral IV 24 Right Antecubital 211 days                    Physical Exam  Vitals reviewed.   Constitutional:       General: He is not in acute distress.     Appearance: Normal appearance. He is normal weight. He is not ill-appearing, toxic-appearing or diaphoretic.   HENT:      Head: Normocephalic and atraumatic.      Nose: Nose normal.      Mouth/Throat:      Mouth: Mucous membranes are moist.   Eyes:      Extraocular Movements: Extraocular movements intact.   Cardiovascular:      Rate and Rhythm: Normal rate and regular rhythm.   Pulmonary:      Effort: Pulmonary effort is normal. No respiratory distress.      Breath sounds: No wheezing, rhonchi or " rales.   Abdominal:      General: Bowel sounds are normal. There is no distension.      Palpations: Abdomen is soft.      Tenderness: There is no abdominal tenderness. There is no guarding or rebound.   Genitourinary:     Penis: Normal.       Testes: Normal.      Prostate: Normal.      Rectum: Normal.   Musculoskeletal:      Cervical back: Normal range of motion and neck supple.   Skin:     General: Skin is dry.   Neurological:      Mental Status: He is alert and oriented to person, place, and time.   Psychiatric:         Mood and Affect: Mood normal.         Behavior: Behavior normal.         Thought Content: Thought content normal.         Judgment: Judgment normal.         Lab Results: I have personally reviewed pertinent reports.    Imaging: Results Review Statement: I personally reviewed the following image studies in PACS and associated radiology reports: CT abdomen/pelvis. My interpretation of the radiology images/reports is: Right renal stones and hydronephrosis.  EKG, Pathology, and Other Studies: Not applicable  VTE Prophylaxis: Sequential compression device (Venodyne)     Code Status: [unfilled]  Advance Directive and Living Will:      Power of :    POLST:      Counseling / Coordination of Care  Total floor / unit time spent today 30 minutes.  Greater than 50% of total time was spent with the patient and / or family counseling and / or coordination of care.  A description of the counseling / coordination of care:  .

## 2024-11-20 NOTE — LETTER
November 20, 2024     Angel Simon MD  [address] [place] [id number]    Patient: Jose Raza   YOB: 1950   Date of Visit: 11/20/2024       Dear Dr. Simon:    Thank you for referring Jose Raza to me for evaluation. Below are my notes for this consultation.    If you have questions, please do not hesitate to call me. I look forward to following your patient along with you.         Sincerely,        Salvatore Celis MD        CC: No Recipients    Salvatore Celis MD  11/20/2024  1:37 PM  Sign when Signing Visit  H&P Exam - Urology   Jose Raza 74 y.o. male MRN: [id number]  Unit/Bed#:  Encounter: [id number]    Assessment & Plan    Assessment:  Right renal stones  Right hydronephrosis  Prostate cancer screening-MARYANA and PSA normal, due for repeat February 2025  Plan:  Cystoscopy right retrograde pyelogram right ureteroscopic laser lithotripsy stent insertion    History of Present Illness  HPI:  Jose Raza is a 74 y.o. male who presents with a history of a very large renal pelvic stone which was addressed using ureteroscopic laser lithotripsy in March 2024.  Follow-up CT in July 2024 identified fragments in the lower and upper poles of the kidney with persistent hydronephrosis but no evidence of the 2.3 cm renal pelvic calculus which was addressed at the original surgery.  The patient will now present for cystoscopy and retrograde pyelography to see if there is any primary obstruction in the upper urinary tract on the right and then for ureteroscopic laser lithotripsy to try to clean up the remnants of the stone in the upper or lower pole.  The patient understands potential for bleeding infection need for stent possible urinary retention possible need for catheter or other operative interventions possible contracture perforation or stricture.  Retained stone or stone fragments were also noted.  The patient expressed understanding and provided verbal and signed informed  "consent.  CT and COM P will be performed prior.    Review of Systems   All other systems reviewed and are negative.     Historical Information  Past Medical History:   Diagnosis Date   • Tsai esophagus 2024    Took famotidine for years   • Compression fracture of body of thoracic vertebra (HCC) 2024    T12, L5, L1   • Marleni Marcelo virus infection 2024   • GERD (gastroesophageal reflux disease) 2024   • History of kyphoplasty    • Kidney stone    • Lumbar vertebral fracture (HCC)    • Memory loss 2024   • Osteoporosis    • Thoracic vertebral fracture (HCC)      Past Surgical History:   Procedure Laterality Date   • COLONOSCOPY      Within last 5 years   • FL RETROGRADE PYELOGRAM  3/29/2024   • HERNIA REPAIR     • IR KYPHOPLASTY/VERTEBROPLASTY  2024   • KNEE SURGERY     • [place] CYSTO/URETERO W/LITHOTRIPSY &INDWELL STENT INSRT Right 3/29/2024    Procedure: CYSTO USCOPE W/  LASER, &  STENT;  Surgeon: Salvatore Celis MD;  Location: [facility];  Service: Urology   • UPPER GASTROINTESTINAL ENDOSCOPY       Social History  Social History     Substance and Sexual Activity   Alcohol Use Not Currently     Social History     Substance and Sexual Activity   Drug Use Never     Social History     Tobacco Use   Smoking Status Former   • Current packs/day: 0.00   • Types: Cigarettes   • Start date:    • Quit date: 1975   • Years since quittin.9   • Passive exposure: Never   Smokeless Tobacco Never   Tobacco Comments    Causal smoker as a young man 50 years ago     Family History: Family history non-contributory    Meds/Allergies  all medications and allergies reviewed  No Known Allergies    Objective  Vitals: Blood pressure 120/80, pulse (!) 51, height 5' 8\" (1.727 m), weight 69.4 kg (153 lb), SpO2 98%.    [unfilled]    Invasive Devices       Peripheral Intravenous Line  Duration             Peripheral IV 24 Right Antecubital 211 days                    Physical Exam  Vitals " reviewed.   Constitutional:       General: He is not in acute distress.     Appearance: Normal appearance. He is normal weight. He is not ill-appearing, toxic-appearing or diaphoretic.   HENT:      Head: Normocephalic and atraumatic.      Nose: Nose normal.      Mouth/Throat:      Mouth: Mucous membranes are moist.   Eyes:      Extraocular Movements: Extraocular movements intact.   Cardiovascular:      Rate and Rhythm: Normal rate and regular rhythm.   Pulmonary:      Effort: Pulmonary effort is normal. No respiratory distress.      Breath sounds: No wheezing, rhonchi or rales.   Abdominal:      General: Bowel sounds are normal. There is no distension.      Palpations: Abdomen is soft.      Tenderness: There is no abdominal tenderness. There is no guarding or rebound.   Genitourinary:     Penis: Normal.       Testes: Normal.      Prostate: Normal.      Rectum: Normal.   Musculoskeletal:      Cervical back: Normal range of motion and neck supple.   Skin:     General: Skin is dry.   Neurological:      Mental Status: He is alert and oriented to person, place, and time.   Psychiatric:         Mood and Affect: Mood normal.         Behavior: Behavior normal.         Thought Content: Thought content normal.         Judgment: Judgment normal.         Lab Results: I have personally reviewed pertinent reports.    Imaging: Results Review Statement: I personally reviewed the following image studies in PACS and associated radiology reports: CT abdomen/pelvis. My interpretation of the radiology images/reports is: Right renal stones and hydronephrosis.  EKG, Pathology, and Other Studies: Not applicable  VTE Prophylaxis: Sequential compression device (Venodyne)     Code Status: [unfilled]  Advance Directive and Living Will:      Power of :    POLST:      Counseling / Coordination of Care  Total floor / unit time spent today 30 minutes.  Greater than 50% of total time was spent with the patient and / or family counseling  and / or coordination of care.  A description of the counseling / coordination of care:  .

## 2024-11-22 ENCOUNTER — TELEPHONE (OUTPATIENT)
Dept: NEPHROLOGY | Facility: CLINIC | Age: 74
End: 2024-11-22

## 2024-11-22 NOTE — TELEPHONE ENCOUNTER
Called patient and spoke with his wife regarding a follow up with Dr. Sharma from [facility].   I scheduled in [place], Wednesday 4/30/2025 at 2:30 Pm, made aware of labs.

## 2024-11-23 ENCOUNTER — APPOINTMENT (OUTPATIENT)
Dept: LAB | Age: 74
End: 2024-11-23
Payer: MEDICARE

## 2024-11-23 DIAGNOSIS — Z91.89 AT RISK FOR NUTRITION PROBLEM: ICD-10-CM

## 2024-11-23 DIAGNOSIS — R82.992 HYPEROXALURIA: ICD-10-CM

## 2024-11-23 DIAGNOSIS — N18.2 CKD (CHRONIC KIDNEY DISEASE) STAGE 2, GFR 60-89 ML/MIN: ICD-10-CM

## 2024-11-23 DIAGNOSIS — N20.0 NEPHROLITHIASIS: ICD-10-CM

## 2024-11-23 LAB
ALBUMIN SERPL BCG-MCNC: 4.4 G/DL (ref 3.5–5)
ALP SERPL-CCNC: 69 U/L (ref 34–104)
ALT SERPL W P-5'-P-CCNC: 21 U/L (ref 7–52)
ANION GAP SERPL CALCULATED.3IONS-SCNC: 10 MMOL/L (ref 4–13)
AST SERPL W P-5'-P-CCNC: 30 U/L (ref 13–39)
BILIRUB SERPL-MCNC: 0.72 MG/DL (ref 0.2–1)
BUN SERPL-MCNC: 14 MG/DL (ref 5–25)
CALCIUM SERPL-MCNC: 9 MG/DL (ref 8.4–10.2)
CHLORIDE SERPL-SCNC: 103 MMOL/L (ref 96–108)
CO2 SERPL-SCNC: 28 MMOL/L (ref 21–32)
CREAT SERPL-MCNC: 0.75 MG/DL (ref 0.6–1.3)
GFR SERPL CREATININE-BSD FRML MDRD: 91 ML/MIN/1.73SQ M
GLUCOSE P FAST SERPL-MCNC: 71 MG/DL (ref 65–99)
POTASSIUM SERPL-SCNC: 3.8 MMOL/L (ref 3.5–5.3)
PROT SERPL-MCNC: 6.8 G/DL (ref 6.4–8.4)
SODIUM SERPL-SCNC: 141 MMOL/L (ref 135–147)

## 2024-11-23 PROCEDURE — 36415 COLL VENOUS BLD VENIPUNCTURE: CPT | Performed by: UROLOGY

## 2024-11-23 PROCEDURE — 80053 COMPREHEN METABOLIC PANEL: CPT | Performed by: UROLOGY

## 2024-11-26 ENCOUNTER — PATIENT MESSAGE (OUTPATIENT)
Dept: INTERNAL MEDICINE CLINIC | Facility: CLINIC | Age: 74
End: 2024-11-26

## 2024-11-26 DIAGNOSIS — R53.83 FATIGUE, UNSPECIFIED TYPE: ICD-10-CM

## 2024-11-26 DIAGNOSIS — E61.1 IRON DEFICIENCY: ICD-10-CM

## 2024-11-26 DIAGNOSIS — Z83.49 FAMILY HISTORY OF MTHFR DEFICIENCY: Primary | ICD-10-CM

## 2024-11-27 ENCOUNTER — TELEPHONE (OUTPATIENT)
Age: 74
End: 2024-11-27

## 2024-11-27 NOTE — TELEPHONE ENCOUNTER
Patient's wife states patient had surgery for kidney stones 8 months ago. States urologist would like to do another procedure. Wife states they would like providers opinion if he should move forward with this or should he see nephrology. States patient stones are not active. Please advise, thank you.

## 2024-11-29 DIAGNOSIS — Z83.49 FAMILY HISTORY OF MTHFR DEFICIENCY: Primary | ICD-10-CM

## 2024-11-29 NOTE — TELEPHONE ENCOUNTER
Spoke to pt's wife regarding the following message per Nuzhat     Please inform patient Dr Sharma is off today but I have reviewed the chart and urology notes states they want to do more procedures to look for any obstructions and clean up remnant stones. This seems appropriate as well from a kidney standpoint as retained stones and obstruction can cause worsening renal function. He does not need a sooner nephrology follow up at this time.      Pt's wife verbalized understanding.

## 2024-12-05 ENCOUNTER — TELEPHONE (OUTPATIENT)
Dept: UROLOGY | Facility: MEDICAL CENTER | Age: 74
End: 2024-12-05

## 2024-12-05 ENCOUNTER — APPOINTMENT (OUTPATIENT)
Dept: LAB | Age: 74
End: 2024-12-05
Payer: MEDICARE

## 2024-12-05 DIAGNOSIS — Z83.49 FAMILY HISTORY OF MTHFR DEFICIENCY: ICD-10-CM

## 2024-12-05 DIAGNOSIS — R53.83 FATIGUE, UNSPECIFIED TYPE: ICD-10-CM

## 2024-12-05 DIAGNOSIS — E61.1 IRON DEFICIENCY: ICD-10-CM

## 2024-12-05 LAB
FERRITIN SERPL-MCNC: 46 NG/ML (ref 24–336)
HCYS SERPL-SCNC: 9.1 UMOL/L (ref 5–20)

## 2024-12-05 PROCEDURE — 36415 COLL VENOUS BLD VENIPUNCTURE: CPT

## 2024-12-05 PROCEDURE — 82728 ASSAY OF FERRITIN: CPT

## 2024-12-05 PROCEDURE — 83090 ASSAY OF HOMOCYSTEINE: CPT

## 2024-12-05 NOTE — TELEPHONE ENCOUNTER
Called patient to schedule his surgery, spoke to patient's wife Alicia.  She stated that the patient wishes to not schedule surgery until after the CT Scan that is scheduled for 12/10.  Per his wife's wishes, I have tentatively held 2/6/25 at the [facility] for his surgery.  I also told his wife that I would call her after the CT Scan to see if they wish to proceed with this surgery.  She expressed understanding

## 2024-12-06 NOTE — TELEPHONE ENCOUNTER
Patients wife, Alicia, called stating the patient started having difficulty urinating yesterday and started taking the Flomax again. I reviewed ED precautions. Alicia verbalized understanding.

## 2024-12-10 ENCOUNTER — HOSPITAL ENCOUNTER (OUTPATIENT)
Dept: RADIOLOGY | Age: 74
Discharge: HOME | End: 2024-12-10
Payer: MEDICARE

## 2024-12-10 ENCOUNTER — RESULTS FOLLOW-UP (OUTPATIENT)
Dept: INTERNAL MEDICINE CLINIC | Facility: CLINIC | Age: 74
End: 2024-12-10

## 2024-12-10 DIAGNOSIS — N20.0 MULTIPLE KIDNEY STONES: ICD-10-CM

## 2024-12-10 PROCEDURE — 74176 CT ABD & PELVIS W/O CONTRAST: CPT

## 2024-12-13 NOTE — TELEPHONE ENCOUNTER
Called this patient again this morning and left a voice message asking them to return my phone call to see if they are interested in scheduling the kidney stone surgery

## 2024-12-13 NOTE — TELEPHONE ENCOUNTER
Patient's wife called stating she is returning the call from the office about scheduling surgery .  She stated they are still waiting for the results of ct scan . The results are still pending to see if he needs to go ahead with the surgery.  Once they know results then they can decide.  Please review results and call patient. [phone]

## 2024-12-20 ENCOUNTER — TELEPHONE (OUTPATIENT)
Dept: UROLOGY | Facility: MEDICAL CENTER | Age: 74
End: 2024-12-20

## 2024-12-20 DIAGNOSIS — N20.0 NEPHROLITHIASIS: Primary | ICD-10-CM

## 2024-12-20 NOTE — TELEPHONE ENCOUNTER
Spoke with pt's wife and appt scheduled for [facility] office per pt's request for 12-19-25 with [facility]. Wife is aware of testing needed prior to appt; orders will be mailed to her. Pt has access to [employer] for appt info.

## 2024-12-20 NOTE — TELEPHONE ENCOUNTER
Called patient to review CT scan. No obstructing stones noted, no hydro. Discussed also with Dr. Celis. No need for surgical intervention at this time. Plan to continue to monitor stone burden. Plan to follow up in 1 year with imaging. Orders placed. Please schedule follow up with imaging for prior.

## 2025-01-14 ENCOUNTER — TELEPHONE (OUTPATIENT)
Age: 75
End: 2025-01-14

## 2025-01-14 NOTE — TELEPHONE ENCOUNTER
MSW attempted outreach to patient's spouse, Alicia, to confirm patient's appointment on 1/15/25 and to complete SW intake.  However, Alicia was not available at time of call.  Voicemail left, with MSW's direct callback information, to confirm patient's appointment and return call requested to complete SW intake.  MSW will remain available as needed.

## 2025-01-15 NOTE — TELEPHONE ENCOUNTER
Spouse called in and stated she decided to cancel the visit for today and upcoming visit.  Please let  know as well.  She said everyone was kind but she decided to cancel.

## 2025-01-17 ENCOUNTER — TELEPHONE (OUTPATIENT)
Age: 75
End: 2025-01-17

## 2025-01-17 NOTE — TELEPHONE ENCOUNTER
Spoke with Guillermo at [facility], asked to refax information they need because we do not have it.    Await fax and have Dr Simon fill out and fax

## 2025-02-03 DIAGNOSIS — R33.9 URINARY RETENTION: ICD-10-CM

## 2025-02-03 DIAGNOSIS — N13.2 HYDRONEPHROSIS WITH OBSTRUCTING CALCULUS: ICD-10-CM

## 2025-02-03 NOTE — TELEPHONE ENCOUNTER
Received fax from [facility] for refill on:  Tamsulosin hcl 0.4mg

## 2025-02-04 RX ORDER — TAMSULOSIN HYDROCHLORIDE 0.4 MG/1
0.4 CAPSULE ORAL
Qty: 90 CAPSULE | Refills: 3 | Status: SHIPPED | OUTPATIENT
Start: 2025-02-04 | End: 2026-01-30

## 2025-02-09 ENCOUNTER — APPOINTMENT (OUTPATIENT)
Dept: LAB | Facility: CLINIC | Age: 75
End: 2025-02-09
Payer: MEDICARE

## 2025-02-09 DIAGNOSIS — Z91.89 AT RISK FOR NUTRITION PROBLEM: ICD-10-CM

## 2025-02-09 DIAGNOSIS — R82.992 HYPEROXALURIA: ICD-10-CM

## 2025-02-09 DIAGNOSIS — N20.0 NEPHROLITHIASIS: ICD-10-CM

## 2025-02-09 DIAGNOSIS — N18.2 CKD (CHRONIC KIDNEY DISEASE) STAGE 2, GFR 60-89 ML/MIN: ICD-10-CM

## 2025-02-14 ENCOUNTER — RESULTS FOLLOW-UP (OUTPATIENT)
Dept: OTHER | Facility: HOSPITAL | Age: 75
End: 2025-02-14

## 2025-02-14 LAB
AMMONIA UR-SCNC: 24 MMOL/24 HR (ref 15–60)
CA H2 PHOS DIHYD 24H SATFR UR: 1.58 (ref 0.5–2)
CALCIUM 24H UR-MRATE: 112 MG/24 HR
CALCIUM/CREAT UR: 96 MG/G CREAT (ref 34–196)
CALCIUM/KG BODY WEIGHT: 1.6 MG/24 HR/KG
CAOX INDEX 24H UR-RTO: 7.56 (ref 6–10)
CHLORIDE 24H UR-SRATE: 79 MMOL/24 HR (ref 70–250)
CITRATE 24H UR-MCNC: 1066 MG/24 HR
COMMENT: ABNORMAL
CREAT UR-MCNC: 1169 MG/24 HR
CREAT/BW 24H UR-RELMRAT: 16.9 MG/24 HR/KG (ref 11.9–24.4)
CYSTINE 24H UR-MCNC: ABNORMAL MG/DL
MAGNESIUM 24H UR-MRATE: 133 MG/24 HR (ref 30–120)
OXALATE UR-MCNC: 69 MG/24 HR (ref 20–40)
PH 24H UR: 7.19 [PH] (ref 5.8–6.2)
PHOSPHATE 24H UR-MRATE: 751 MG/24 HR (ref 600–1200)
POTASSIUM 24H UR-SCNC: 94 MMOL/24 HR (ref 20–100)
PROTEIN CATABOLIC RATE 24H UR-MRATE: 1 G/KG/24 HR (ref 0.8–1.4)
SODIUM 24H UR-SRATE: 80 MMOL/24 HR (ref 50–150)
SPECIMEN VOL 24H UR: 1760 ML/24 HR (ref 500–4000)
SULFATE 24H UR-SRATE: 34 MEQ/24 HR (ref 20–80)
URATE 24H SATFR UR: 0.06
URATE 24H UR-MRATE: 672 MG/24 HR
UUN 24H UR-MRATE: 9.35 G/24 HR (ref 6–14)

## 2025-03-05 RX ORDER — AMMONIUM LACTATE 12 G/100G
CREAM TOPICAL
COMMUNITY
Start: 2025-01-16

## 2025-03-06 ENCOUNTER — OFFICE VISIT (OUTPATIENT)
Dept: INTERNAL MEDICINE CLINIC | Facility: CLINIC | Age: 75
End: 2025-03-06
Payer: MEDICARE

## 2025-03-06 VITALS
DIASTOLIC BLOOD PRESSURE: 86 MMHG | SYSTOLIC BLOOD PRESSURE: 118 MMHG | BODY MASS INDEX: 24.15 KG/M2 | TEMPERATURE: 97.9 F | HEART RATE: 62 BPM | WEIGHT: 158.8 LBS | OXYGEN SATURATION: 98 %

## 2025-03-06 DIAGNOSIS — M10.9 PODAGRA: Primary | ICD-10-CM

## 2025-03-06 PROCEDURE — 99213 OFFICE O/P EST LOW 20 MIN: CPT | Performed by: INTERNAL MEDICINE

## 2025-03-06 PROCEDURE — G2211 COMPLEX E/M VISIT ADD ON: HCPCS | Performed by: INTERNAL MEDICINE

## 2025-03-06 RX ORDER — COLCHICINE 0.6 MG/1
TABLET ORAL
Qty: 10 TABLET | Refills: 0 | Status: SHIPPED | OUTPATIENT
Start: 2025-03-06

## 2025-03-06 NOTE — PATIENT INSTRUCTIONS
-The pain and swelling involving your big toe is likely secondary to gout.  You will be started on a course of colchicine to help resolve your gout flare.  Please ice the affected area 3 times a day for at least 10 to 15 minutes.

## 2025-03-06 NOTE — PROGRESS NOTES
Name: Jose Raza      : 1950      MRN: [id number]  Encounter Provider: Angel Simon MD  Encounter Date: 3/6/2025   Encounter department: [facility]  :  Assessment & Plan  Podagra  -Suspect patient's symptoms are secondary to an acute gout flare.  He has been given a prescription for a course of colchicine.  Recommended icing the affected area 3 times a day for 10 to 15 minutes at a time.  He has been provided with a handout detailing a low purine diet.  Plan to check uric acid level during his upcoming Medicare Wellness Visit.  Orders:  •  colchicine (COLCRYS) 0.6 mg tablet; Take 1.2 mg (2 tablets) followed by 0.6 mg (1 tablet) an hour later.  Then take 1 tablet daily until gout flare resolves.         History of Present Illness   HPI  Patient presents today as an acute visit.  He complains of pain and swelling involving his right great toe over the past 3 days.  He is accompanied by his wife who has helped to provide some of the history.  She does note that prior to the onset of his pain he was walking barefoot on an incline on his treadmill for about 40 minutes.  Patient reports at times the pain has woken him from sleep.  He has taken some Motrin which has helped to relieve the pain.    Review of Systems  All other systems negative except for pertinent findings noted in HPI.     Objective   /86 (BP Location: Left arm, Patient Position: Sitting, Cuff Size: Standard)   Pulse 62   Temp 97.9 °F (36.6 °C) (Tympanic)   Wt 72 kg (158 lb 12.8 oz)   SpO2 98%   BMI 24.15 kg/m²      Physical Exam  General: NAD   HEENT: NCAT, EOMI, normal conjunctiva  Musculoskeletal: Mild erythema and swelling involving right first MTP joint, area warm to touch and tender to palpation  Extremities: No lower extremity edema  Skin: Normal skin color, no rashes   Psychiatric: Normal mood and affect

## 2025-04-25 ENCOUNTER — APPOINTMENT (OUTPATIENT)
Dept: LAB | Age: 75
End: 2025-04-25
Attending: INTERNAL MEDICINE
Payer: MEDICARE

## 2025-04-25 LAB
ALBUMIN SERPL BCG-MCNC: 4.3 G/DL (ref 3.5–5)
ANION GAP SERPL CALCULATED.3IONS-SCNC: 7 MMOL/L (ref 4–13)
BUN SERPL-MCNC: 15 MG/DL (ref 5–25)
CALCIUM SERPL-MCNC: 8.8 MG/DL (ref 8.4–10.2)
CHLORIDE SERPL-SCNC: 104 MMOL/L (ref 96–108)
CO2 SERPL-SCNC: 28 MMOL/L (ref 21–32)
CREAT SERPL-MCNC: 0.7 MG/DL (ref 0.6–1.3)
GFR SERPL CREATININE-BSD FRML MDRD: 93 ML/MIN/1.73SQ M
GLUCOSE P FAST SERPL-MCNC: 81 MG/DL (ref 65–99)
PHOSPHATE SERPL-MCNC: 3.6 MG/DL (ref 2.3–4.1)
POTASSIUM SERPL-SCNC: 3.9 MMOL/L (ref 3.5–5.3)
SODIUM SERPL-SCNC: 139 MMOL/L (ref 135–147)

## 2025-04-30 ENCOUNTER — OFFICE VISIT (OUTPATIENT)
Dept: NEPHROLOGY | Facility: CLINIC | Age: 75
End: 2025-04-30
Payer: MEDICARE

## 2025-04-30 VITALS
DIASTOLIC BLOOD PRESSURE: 74 MMHG | WEIGHT: 148 LBS | HEIGHT: 68 IN | SYSTOLIC BLOOD PRESSURE: 132 MMHG | BODY MASS INDEX: 22.43 KG/M2

## 2025-04-30 DIAGNOSIS — R82.992 HYPEROXALURIA: ICD-10-CM

## 2025-04-30 DIAGNOSIS — N20.0 NEPHROLITHIASIS: Primary | ICD-10-CM

## 2025-04-30 DIAGNOSIS — N18.1 CKD (CHRONIC KIDNEY DISEASE) STAGE 1, GFR 90 ML/MIN OR GREATER: ICD-10-CM

## 2025-04-30 PROBLEM — N18.2 CKD (CHRONIC KIDNEY DISEASE) STAGE 2, GFR 60-89 ML/MIN: Status: RESOLVED | Noted: 2024-09-11 | Resolved: 2025-04-30

## 2025-04-30 PROCEDURE — 99213 OFFICE O/P EST LOW 20 MIN: CPT | Performed by: INTERNAL MEDICINE

## 2025-04-30 NOTE — ASSESSMENT & PLAN NOTE
Lab Results   Component Value Date    EGFR 93 04/25/2025    EGFR 91 11/23/2024    EGFR 91 11/14/2024    CREATININE 0.70 04/25/2025    CREATININE 0.75 11/23/2024    CREATININE 0.74 11/14/2024   after review of records In [facility] as well as Care everywhere patient has a baseline creatinine of 0.6-0.9 mg/dL.   Most recent labs show a Creatinine of 0.70 mg/dL on 4/25/25. Renal function remains stable.  Check blood work prior to next visit  Likely has underlying CKD due to nephrolithiasis  Imaging:   CT abdomen 12/10/2024 multiple bilateral nonobstructive renal calculi  Recommend to avoid use of NSAIDs, nephrotoxins. Caution advised with regards to exposure to IV contrast dye.   Discussed with the patient in depth his renal status, including the possible etiologies for CKD.   Advised the patient that when his GFR is close to 20mL/min then will start discussing about RRT(renal replacement therapy) options such as renal transplant, peritoneal dialysis and hemodialysis.   Informed the patient about the various options for Renal Replacement therapy.  Discussed with the patient how we need to work together to delay the progression of CKD with optimal BP control based on their age and co-morbidities, optimal BS control with HbA1c of <7% and trying to reduce proteinuria by the use of anti-proteinuric agents.   CKD education/Kidney smart: Not applicable at this time  Follow-up: Patient is to follow-up in 12 months, with lab work to be performed a few days prior to the next visit. Advised patient to call me in 10 days to review the results if they do not hear back from me, as I may have not received the results.    Orders:  •  Renal function panel; Future  •  Stone risk profile; Future

## 2025-04-30 NOTE — PROGRESS NOTES
Name: Jose Raza      : 1950      MRN: [id number]  Encounter Provider: Ailin Sharma MD  Encounter Date: 2025   Encounter department: [facility]  :74 y.o. male presents with history of nephrolithiasis to the office for routine follow-up      Assessment & Plan  Nephrolithiasis  Discussed with the patient that the most common types of kidney stones are calcium oxalate stones.   Advised to follow a diet with increased fluid intake so that urine output is greater than 2-2.5L/day.  Advised patient to decrease salt, protein and oxalate intake.   Dietary handouts given.  24-hour urine from May 2020 for good urine volumes elevated urine oxalates  Status post ureteroscopy 3/29/2024  24 urine from 2025 shows low urine volumes, elevated urine oxalates.  Good urine citrate levels  Imaging:   CT abdomen 12/10/2024 multiple bilateral nonobstructive renal calculi  Urged patient to increase fluid intake and cut down on foods that have oxalates.  Ordered for 24 hr litholink stone workup analysis to be done prior to next visit.   advised patient to call 2 weeks after it is completed to review and discuss the results  Check renal function panel prior to next visit  No Recent stone episodes  Continue to follow-up with urology in case there is pain related to stones  Orders:  •  Renal function panel; Future  •  Stone risk profile; Future    CKD (chronic kidney disease) stage 1, GFR 90 ml/min or greater  Lab Results   Component Value Date    EGFR 93 2025    EGFR 91 2024    EGFR 91 2024    CREATININE 0.70 2025    CREATININE 0.75 2024    CREATININE 0.74 2024   after review of records In [facility] as well as Care everywhere patient has a baseline creatinine of 0.6-0.9 mg/dL.   Most recent labs show a Creatinine of 0.70 mg/dL on 25. Renal function remains stable.  Check blood work prior to next visit  Likely has underlying CKD due to  nephrolithiasis  Imaging:   CT abdomen 12/10/2024 multiple bilateral nonobstructive renal calculi  Recommend to avoid use of NSAIDs, nephrotoxins. Caution advised with regards to exposure to IV contrast dye.   Discussed with the patient in depth his renal status, including the possible etiologies for CKD.   Advised the patient that when his GFR is close to 20mL/min then will start discussing about RRT(renal replacement therapy) options such as renal transplant, peritoneal dialysis and hemodialysis.   Informed the patient about the various options for Renal Replacement therapy.  Discussed with the patient how we need to work together to delay the progression of CKD with optimal BP control based on their age and co-morbidities, optimal BS control with HbA1c of <7% and trying to reduce proteinuria by the use of anti-proteinuric agents.   CKD education/Kidney smart: Not applicable at this time  Follow-up: Patient is to follow-up in 12 months, with lab work to be performed a few days prior to the next visit. Advised patient to call me in 10 days to review the results if they do not hear back from me, as I may have not received the results.    Orders:  •  Renal function panel; Future  •  Stone risk profile; Future    Hyperoxaluria  24 urine from February 2025 again suggestive of high oxalates  Handouts provided with regards to low oxalate diet  Check 24-hour urine prior to next visit  Orders:  •  Renal function panel; Future  •  Stone risk profile; Future        History of Present Illness   HPI  Jsoe Raza is a 74 y.o. male who presents with spouse feels well has no complaints no recent hospitalization is trying to drink more normal passage of stone since last visit thankful for the care information that is gone today watching out his diet does not consume too many high oxalate foods as per him.      Review of Systems   Constitutional:  Negative for chills and fever.   HENT:  Negative for congestion.    Respiratory:   "Negative for cough and wheezing.    Cardiovascular:  Negative for leg swelling.   Gastrointestinal:  Negative for constipation.   Genitourinary:  Negative for dysuria and hematuria.   Musculoskeletal:  Negative for back pain.   Neurological:  Negative for dizziness and headaches.   Psychiatric/Behavioral:  Negative for agitation and confusion.    All other systems reviewed and are negative.         Objective   /74 (BP Location: Left arm, Patient Position: Sitting, Cuff Size: Standard)   Ht 5' 8\" (1.727 m)   Wt 67.1 kg (148 lb)   BMI 22.50 kg/m²      Physical Exam  Vitals reviewed.   Constitutional:       General: He is not in acute distress.     Appearance: Normal appearance. He is normal weight. He is not ill-appearing, toxic-appearing or diaphoretic.   HENT:      Head: Normocephalic and atraumatic.      Mouth/Throat:      Mouth: Mucous membranes are moist.      Pharynx: No oropharyngeal exudate.   Eyes:      General: No scleral icterus.  Cardiovascular:      Rate and Rhythm: Normal rate.   Pulmonary:      Effort: No respiratory distress.      Breath sounds: Normal breath sounds. No wheezing.   Abdominal:      General: There is no distension.      Palpations: There is no mass.      Tenderness: There is no right CVA tenderness or left CVA tenderness.   Musculoskeletal:         General: No swelling.      Cervical back: Normal range of motion. No rigidity.   Skin:     Coloration: Skin is not jaundiced.   Neurological:      General: No focal deficit present.      Mental Status: He is alert and oriented to person, place, and time.   Psychiatric:         Mood and Affect: Mood normal.         Behavior: Behavior normal.           "

## 2025-04-30 NOTE — ASSESSMENT & PLAN NOTE
24 urine from February 2025 again suggestive of high oxalates  Handouts provided with regards to low oxalate diet  Check 24-hour urine prior to next visit  Orders:  •  Renal function panel; Future  •  Stone risk profile; Future

## 2025-04-30 NOTE — PATIENT INSTRUCTIONS
"Get  24 hour urine closer to next visit    Please call me in 10 days after having your blood work done to review the results if you do not hear back from me or my office, as I may have not received the results.  please remember to perform blood work prior to the next visit.  Please call if the blood pressure top number is greater than 140 or less than 110 consistently.  Please call if you are gaining more than 2lbs in 2 days for adjustment of water pills.  ~ Please AVOID the following pain medications.  LIST OF NSAIDS (NONSTEROIDAL ANTI-INFLAMMATORY DRUGS) AND MILLER-2 INHIBITORS    DIFLUNISAL (DOLOBID)  IBUPROFEN (MOTRIN, ADVIL)  FLURBIPROFEN (ANSAID)  KETOPROFEN (ORUDIS, ORUVAIL)  FENOPROFEN (NALFON)  NABUMETONE (RELAFEN)  PIROXICAM (FELDENE)  NAPROXEN (ALEVE, NAPROSYN, NAPRELAN, ANAPROX)  DICLOFENAC (VOLTAREN, CATAFLAM)  INDOMETHACIN (INDOCIN)  SULINDAC (CLINORIL)  TOLMETIN (TOLETIN)  ETODOLAC (LODINE)  MELOXICAM (MOBIC)  KETOROLAC (TORADOL)  OXAPROZIN (DAYPRO)  CELECOXIB (CELEBREX)Kidney Stone Prevention    Fluid Intake    A high fluid intake is one of the most important cornerstones of kidney stone dietary prevention. A sufficiently dilute urine will prevent the individual chemical components of stones from becoming concentrated enough to precipitate out of solution, keeping them instead in their dissolved state. A high urine output also may reduce stone from forming through \"flushing\" out of stone components and prevention of urine stagnation. In addition to stone benefits, increased water intake has been shown to have a multitude of other benefits, including improved alertness, better skin appearance, enhanced physical performance, reduced constipation and enhanced weight loss.    The average daily urine output for normal healthy adults is 1.2 liters a day, ranging from 1 to 2 liters in most individuals and varying with body weight and gender. In stone formers, however, a higher daily urine output is required " for stone prevention and achieving a daily volume of at least 2.0 to 2.5 liters a day can significantly reduce the recurrence of future stones. In a randomized study of stone formers who were given specific instructions to increase their fluid intake compared to stone formers told to not change their diet, those given specific fluid instructions achieved a high urine output of 2.6 liters a day versus 1.0 liters a day in those not given dietary instructions. Over a period of five years, the high fluid intake group was half as likely to form new stones as compared to the normal fluid intake group (Benigno et al, J Urol 1996).    We recommend that most stone formers increase their daily fluid intake by one liter (an additional two 16 oz water bottles or two tall glasses a day) in order to achieve a urine output of 2.5 liters a day. (One liter = 4.2 8-oz glasses or 34 oz). Alternatively, a 24 hour urine collection can be performed to guide fluid intake to achieve 2.5 liters of urine output in a specific patient.    Type of Fluid Intake    The type of fluid intake is generally less important than the total intake. While drinking water is our preferred recommendation because it is inexpensive and contains no calories, for stone patients who do not enjoy drinking water, any beverage will be beneficial in reducing stone risk.    Contrary to popular belief, studies have found that an increased intake of tea, coffee, and alcoholic beverage actually reduces the risk of stones, possibly because of an associated increase in urine output (Tanesha et al, Am J of Epidemiology, 1996). While tea contains high levels of oxalate, this does not appear to result in increased stone formation for the reasons discussed below in discussion on oxalate.    Soda intake (including frantz) and milk intake also do not appear to increase the risk of stones.    Citrus Fruit Juices    Citrus juices, including lemon juice and orange juice, contain  citrate, which acts as a stone inhibitor for calcium based stones. Citrate seems to do this by binding calcium, making it unavailable to combine with oxalate or phosphate: a necessary first step in the formation of stones. Citrate also seems to make it more difficult for stones to grow once they've formed.    Drinking citrus juice in the form of concentrated lemon juice mixed with water has been shown to effectively increase urinary citrate levels and reduce urinary calcium levels, both of which will reduce stone risk. Orange juice will similarly increase urinary citrate levels. However, orange juice appears to also increase urinary oxalate levels ( a stone promoter). Other sources of citrate, including grapefruit juice, have had less research completed confirming their beneficial effects on urinary citrate levels. Therefore, lemon juice is typically favored over the other citrus juices as a natural method to increase urinary citrate levels. Many patients find drinking citrus juices to be an attractive alternative to pharmaceutical treatment with potassium citrate.    We recommend that stone formers consider supplementing their daily fluid intake with a mixture of 60 ml of concentrated lemon juice in one liter of water to increase their urinary citrate levels.    Salt    A high sodium intake increases the risk of stone formation by increasing calcium levels and decreasing citrate (a stone inhibitor) levels in urine. Additionally, high sodium intake will impair the ability of medications such as hydrochlorothiazide to effectively reduce calcium levels in urine. A study of stone formers who were kept on a strict diet with a maximum daily sodium intake of 50 mmol (1200 mg) in addition to a reduced protein diet demonstrated that the low sodium diet was effective in reducing stone recurrence by 50% as compared to the low calcium diet.    We recommend that stone formers aim to follow the FDA's guideline of limiting salt  intake to 2300 mg of sodium a day in the general population and 1500 mg of sodium a day in those with hypertension,  Americans, or middle aged and older adults. 2300 mg is equivalent to about 1 teaspoon of table salt.    The best way to determine the salt content of your food is to read the nutrition label. Processed foods tend to contain higher amounts of salt. Choose low sodium options whenever possible.    1 cup of canned chicken noodle soup contains 870 mg of sodium.  A fried chicken drumstick contains 310 mg of sodium.  A serving of shrimp contains 240 mg of sodium.  2 slices of white bread contains 200 mg of sodium.  15 potato chips contain 180 mg of sodium.  1 container of strawberry yogurt contains 85 mg of sodium.  1 tomato contains 20 mg of sodium.  1 apple contains 0 mg of sodium.    In addition to lowering the risk of stones, a low sodium intake helps to control or prevent high blood pressure, which can lead to heart disease, stroke, heart failure, and kidney disease.    Animal Protein    Animal protein in meat products increases the risk of stone by increasing calcium, oxalate, and uric acid levels in urine. All three of these changes increase the risk of stones. In studies comparing high meat eaters versus low meat eaters, high meat eaters were found to be at increased risk of forming stones. A randomized study of stone formers restricted to a low meat intake of 52 grams a day (equivalent to 8 oz of beef) in combination with sodium restriction found that the combination reduced stone recurrence by 50% compared to calcium restriction alone (Benigno et al, [facility] 2002).    We recommend that most stone formers try to reduce their meat intake to 6 oz a day. This includes all types of meat: beef, pork, poultry, and seafood.    The USDA has recommended a daily allowance of 5-6 oz of protein intake among adults. They also recommend choosing non-meat protein foods such as nuts and beans instead of meat  sources. Protein from non-meat sources does not appear to increase the risk of stones.    A small steak contains about 3-4 oz of protein.  A quarter pound hamburger with cheese contains 4 oz of protein.  A chicken breast contains about 5 oz of protein, a chicken thigh about 2.5 oz, a chicken drumstick about 1.5 oz.  One 5 oz can of tuna contains 5 oz of protein.  1 medium egg contains 1 oz of protein.    Lowering your animal protein intake and eating more fruits and vegetables also benefits your overall health by limiting the amount of saturated fats and cholesterol in your diet. This helps to reduce your risk of cardiovascular disease.    Oxalate    While oxalate plays an important role in the development of calcium oxalate stones, dietary restriction does not appear to be effective in reducing the risk of stones in the majority of patients. About 40% of urinary oxalate comes from dietary sources while the remainder is naturally made within the liver. Therefore, reducing oxalate dietary intake does not always have a significant impact on total urinary oxalate levels.    Oxalate is found in many vegetable and fruits, including many healthy dietary choices often making it difficult to achieve a low oxalate diet. Because of these issues, oxalate avoidance is beneficial primarily in those individuals with specific abnormalities that lead to high oxalate urinary levels.    We recommend that most stone formers should maintain a normal oxalate intake without the need for oxalate restriction. High oxalate intake should be avoided in individuals found to have high urinary oxalate levels on metabolic evaluation. Oxalate restriction may be beneficial in certain individuals with high urinary oxalate levels.    Oxalate Rich Foods    Tea (black)  Spinach  Mustard Greens  Chard  Beets  Rhubarb  Okra  Berries  Chocolate  Nuts  Sweet Potatoes        Calcium    Kidney Stone formers often question whether to stop or reduce their  calcium intake. Despite the fact that calcium is a major component of 75% of stones, excessive calcium intake is vary rarely the cause of stone formation. In fact, several studies have shown that restricting calcium intake in most stone formers actually increases the number of stones they develop. This appears to happen because when less calcium is ingested, it becomes easier for oxalate (which normally binds with calcium in the gut) to be absorbed. Higher levels of oxalate in the urine then lead to an increase in stone risk. Calcium obtained from dietary sources appears to be better than calcium from supplements in regards to lowering stone risk because supplements can actually increase your risk of stones slightly (by 17%) while dietary calcium intake is instead associated with lower stone risk. If you need to take supplements, taking them during meals appear to be better in terms of stone risk.    We recommend that stone formers maintain a normal calcium intake, preferably from dietary sources. Female stone formers taking calcium supplementation to prevent osteoporosis experience a slightly increased risk of stones (17%) which needs to be balanced against their risk of osteoporosis.

## 2025-04-30 NOTE — ASSESSMENT & PLAN NOTE
Discussed with the patient that the most common types of kidney stones are calcium oxalate stones.   Advised to follow a diet with increased fluid intake so that urine output is greater than 2-2.5L/day.  Advised patient to decrease salt, protein and oxalate intake.   Dietary handouts given.  24-hour urine from May 2020 for good urine volumes elevated urine oxalates  Status post ureteroscopy 3/29/2024  24 urine from February 2025 shows low urine volumes, elevated urine oxalates.  Good urine citrate levels  Imaging:   CT abdomen 12/10/2024 multiple bilateral nonobstructive renal calculi  Urged patient to increase fluid intake and cut down on foods that have oxalates.  Ordered for 24 hr litholink stone workup analysis to be done prior to next visit.   advised patient to call 2 weeks after it is completed to review and discuss the results  Check renal function panel prior to next visit  No Recent stone episodes  Continue to follow-up with urology in case there is pain related to stones  Orders:  •  Renal function panel; Future  •  Stone risk profile; Future

## 2025-05-16 ENCOUNTER — TELEPHONE (OUTPATIENT)
Age: 75
End: 2025-05-16

## 2025-05-16 NOTE — TELEPHONE ENCOUNTER
Pts wife asking for NP appt for her / Dx  benson    Advised we would need referral and supporting documents referring pt to ID    Alicia said she will contact PCP regarding referral / and documents

## 2025-05-23 ENCOUNTER — APPOINTMENT (OUTPATIENT)
Dept: LAB | Age: 75
End: 2025-05-23
Attending: INTERNAL MEDICINE
Payer: MEDICARE

## 2025-05-23 ENCOUNTER — APPOINTMENT (OUTPATIENT)
Dept: LAB | Age: 75
End: 2025-05-23
Payer: MEDICARE

## 2025-05-23 DIAGNOSIS — M81.8 IDIOPATHIC OSTEOPOROSIS: ICD-10-CM

## 2025-05-23 LAB
25(OH)D3 SERPL-MCNC: 30.8 NG/ML (ref 30–100)
ANION GAP SERPL CALCULATED.3IONS-SCNC: 7 MMOL/L (ref 4–13)
BUN SERPL-MCNC: 14 MG/DL (ref 5–25)
CALCIUM SERPL-MCNC: 9.3 MG/DL (ref 8.4–10.2)
CHLORIDE SERPL-SCNC: 104 MMOL/L (ref 96–108)
CO2 SERPL-SCNC: 30 MMOL/L (ref 21–32)
CREAT SERPL-MCNC: 0.7 MG/DL (ref 0.6–1.3)
GFR SERPL CREATININE-BSD FRML MDRD: 93 ML/MIN/1.73SQ M
GLUCOSE P FAST SERPL-MCNC: 77 MG/DL (ref 65–99)
POTASSIUM SERPL-SCNC: 4.6 MMOL/L (ref 3.5–5.3)
SODIUM SERPL-SCNC: 141 MMOL/L (ref 135–147)

## 2025-05-23 PROCEDURE — 82306 VITAMIN D 25 HYDROXY: CPT

## 2025-05-23 PROCEDURE — 80048 BASIC METABOLIC PNL TOTAL CA: CPT

## 2025-05-23 PROCEDURE — 82523 COLLAGEN CROSSLINKS: CPT

## 2025-05-23 PROCEDURE — 84080 ASSAY ALKALINE PHOSPHATASES: CPT

## 2025-05-23 PROCEDURE — 36415 COLL VENOUS BLD VENIPUNCTURE: CPT

## 2025-05-27 LAB — ALP BONE SERPL-MCNC: 9.3 UG/L (ref 7.6–24.8)

## 2025-05-30 LAB — COLLAGEN CTX SERPL-MCNC: 36 PG/ML

## 2025-06-12 ENCOUNTER — OFFICE VISIT (OUTPATIENT)
Dept: INTERNAL MEDICINE CLINIC | Facility: CLINIC | Age: 75
End: 2025-06-12
Payer: MEDICARE

## 2025-06-12 VITALS
HEART RATE: 62 BPM | SYSTOLIC BLOOD PRESSURE: 102 MMHG | OXYGEN SATURATION: 98 % | WEIGHT: 155 LBS | BODY MASS INDEX: 23.57 KG/M2 | DIASTOLIC BLOOD PRESSURE: 70 MMHG

## 2025-06-12 DIAGNOSIS — K21.9 GASTROESOPHAGEAL REFLUX DISEASE, UNSPECIFIED WHETHER ESOPHAGITIS PRESENT: ICD-10-CM

## 2025-06-12 DIAGNOSIS — R41.3 MEMORY LOSS: ICD-10-CM

## 2025-06-12 DIAGNOSIS — Z00.00 MEDICARE ANNUAL WELLNESS VISIT, SUBSEQUENT: Primary | ICD-10-CM

## 2025-06-12 DIAGNOSIS — R41.89 COGNITIVE IMPAIRMENT: ICD-10-CM

## 2025-06-12 DIAGNOSIS — B35.1 ONYCHOMYCOSIS: ICD-10-CM

## 2025-06-12 DIAGNOSIS — R47.01 APHASIA: ICD-10-CM

## 2025-06-12 DIAGNOSIS — H61.23 BILATERAL IMPACTED CERUMEN: ICD-10-CM

## 2025-06-12 PROCEDURE — G0439 PPPS, SUBSEQ VISIT: HCPCS

## 2025-06-12 RX ORDER — CICLOPIROX 80 MG/ML
SOLUTION TOPICAL
Qty: 6 ML | Refills: 0 | Status: SHIPPED | OUTPATIENT
Start: 2025-06-12

## 2025-06-12 NOTE — PROGRESS NOTES
Patient Education     Bronchitis, Antibiotic Treatment (Adult)    Bronchitis is an infection of the air passages (bronchial tubes) in your lungs. It often occurs when you have a cold. This illness is contagious during the first few days and is spread through the air by coughing and sneezing, or by direct contact (touching the sick person and then touching your own eyes, nose, or mouth).  Symptoms of bronchitis include cough with mucus (phlegm) and low-grade fever. Bronchitis usually lasts 7 to 14 days. Mild cases can be treated with simple home remedies. More severe infection is treated with an antibiotic.  Home care  Follow these guidelines when caring for yourself at home:    If your symptoms are severe, rest at home for the first 2 to 3 days. When you go back to your usual activities, don't let yourself get too tired.    Don't smoke. Also stay away from secondhand smoke.    You may use over-the-counter medicines to control fever or pain, unless another medicine was prescribed. If you have chronic liver or kidney disease or have ever had a stomach ulcer or gastrointestinal bleeding, talk with your healthcare provider before using these medicines. Also talk to your provider if you are taking medicine to prevent blood clots. Aspirin should never be given to anyone younger than 18 who is ill with a viral infection or fever. It may cause severe liver or brain damage.    Your appetite may be low, so a light diet is fine. Stay well hydrated by drinking 6 to 8 glasses of fluids per day. This includes water, soft drinks, sports drinks, juices, tea, or soup. Extra fluids will help loosen mucus in your nose and lungs.    Over-the-counter cough, cold, and sore-throat medicines will not shorten the length of the illness, but they may be helpful to reduce your symptoms. Don't use decongestants if you have high blood pressure.    Finish all antibiotic medicine. Do this even if you are feeling better after only a few  Name: Jose Raza      : 1950      MRN: [id number]  Encounter Provider: Cherri Raza MD  Encounter Date: 2025   Encounter department: [facility]  :  Assessment & Plan  Medicare annual wellness visit, subsequent  Patient presents for Medicare wellness visit.       Onychomycosis  Patient has onychomycosis of his toenails and multiple fingernails  Patient trialing Jublia for his toenails per podiatry  Will prescribe Penlac to use on his fingernails  Patient informed that it can take several months of treatment to improve/resolve onychomycosis    Orders:    ciclopirox (PENLAC) 8 % solution; Apply topically daily at bedtime    Bilateral impacted cerumen  Bilateral cerumen impaction noted.  TM not visible.  Given patient's history of memory impairment as well as aphasia.  Patient may benefit from ear cleaning  Referral for ENT has been sent.    Orders:    Ambulatory Referral to Otolaryngology; Future    Gastroesophageal reflux disease, unspecified whether esophagitis present  Patient denies any symptoms of GERD/heartburn which they are attributing to dietary change.  Patient has eliminated gluten and dairy from his diet as well as decrease sugar and processed meat intake       Cognitive impairment  Memory loss  Aphasia    Patient was last seen by neurology 3/25/2024 for evaluation of memory loss and aphasia.  Patient has had workup for the symptoms in the past at which time he was diagnosed with mild cognitive impairment 2019.  He was also seen by speech therapy.  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  He was advised to follow-up in 4 months and was referred to neuropsychology.  Patient and wife wish to postpone further workup at this time.          Preventive health issues were discussed with patient, and age appropriate screening tests were ordered as noted in patient's After Visit  Summary. Personalized health advice and appropriate referrals for health education or preventive services given if needed, as noted in patient's After Visit Summary.    History of Present Illness     HPI     Jose Raza is a 74-year-old male with a PMHx GERD, compression fractures 2/2 osteoporosis, kidney stones, CKD, mild cognitive impairment, progressive aphasia who presents for an annual wellness visit.  Today patient is overall asymptomatic without any complaints.  Patient and his wife, who is providing supplemental history today, states they returned from [facility] with grandchildren grandchildren.     Regarding this patient's memory loss and aphasia patient and wife feel it is at baseline/unchanged. Wife states patient saw speech therapy previously who gave him techniques as well as books to help with word finding that have been helpful.  They have also been able to obtain Lingraphica device which he uses intermittently that has been helpful.  Upon chart review, patient saw neurology who advised him to follow-up in 4 months as well as referred him for further neuropsych testing.  Wife and patient they do not wish to pursue further testing or follow-up regarding this at this time.  They are aware that we are available to coordinate these follow-ups when they would like to proceed.    Patient has onychomycosis of both his fingernails and toenails.  His podiatrist is currently trailing Jumartell for his toenails.  Patient is aware that is not improvement could take months.    Patient is continue to follow-up with Endo regarding his osteoporosis.  He continues to receive Prolia injections and has been increasing his physical activity.    Patient was recently seen for podagra and prescribed colchicine.  Patient states he did not take this medication and that it improved with rest.  Patient has not had any issues since.      Patient Care Team:  Angel Simon MD as PCP - General (Internal Medicine)  Ailin Dye  days.  Follow-up care  Follow up with your healthcare provider, or as advised. If you had an X-ray or ECG (electrocardiogram), a specialist will review it. You will be told of any new test results that may affect your care.  If you are age 65 or older, if you smoke, or if you have a chronic lung disease or condition that affects your immune system, ask your healthcare provider about getting a pneumococcal vaccine and a yearly flu shot (influenza vaccine).  When to seek medical advice  Call your healthcare provider right away if any of these occur:    Fever of 100.4 F (38 C) or higher, or as directed by your healthcare provider    Coughing up more sputum    Weakness, drowsiness, headache, facial pain, ear pain, or a stiff neck  Call 911  Call 911 if any of these occur.    Coughing up blood    Weakness, drowsiness, headache, or stiff neck that get worse    Trouble breathing, wheezing, or pain with breathing  Date Last Reviewed: 6/1/2018 2000-2019 [employer] [address] [place] [address]. All rights reserved. This information is not intended as a substitute for professional medical care. Always follow your healthcare professional's instructions.           Patient Education     Acute Bacterial Rhinosinusitis (ABRS)    Acute bacterial rhinosinusitis (ABRS) is an infection of your nasal cavity and sinuses. It s caused by bacteria. Acute means that you ve had symptoms for less than 4 weeks, but possibly up to 12 weeks.  Understanding your sinuses  The nasal cavity is the large air-filled space behind your nose. The sinuses are a group of spaces formed by the bones of your face. They connect with your nasal cavity. ABRS causes the tissue lining these spaces to become inflamed. Mucus may not drain normally. This leads to facial pain and other symptoms.  What causes ABRS?  ABRS most often follows an upper respiratory infection caused by a virus. Bacteria then infect the lining of your nasal  MD Zachary (Nephrology)    Review of Systems   Constitutional:  Negative for chills and fever.   HENT:  Negative for sore throat.    Respiratory:  Negative for cough and shortness of breath.    Cardiovascular:  Negative for chest pain, palpitations and leg swelling.   Gastrointestinal:  Negative for abdominal pain, constipation, diarrhea, nausea and vomiting.   Genitourinary:  Negative for decreased urine volume, difficulty urinating, frequency and urgency.   Musculoskeletal:  Negative for arthralgias, back pain and myalgias.   Skin:  Positive for color change (brusing hands and left forearm due to yardwork yesterday).   Neurological:  Negative for dizziness, weakness, numbness and headaches.     Medical History Reviewed by provider this encounter:       Annual Wellness Visit Questionnaire       Health Risk Assessment:   Patient rates overall health as good. Patient feels that their physical health rating is slightly better. Patient is satisfied with their life. Eyesight was rated as same. Hearing was rated as same. Patient feels that their emotional and mental health rating is same. Patients states they are never, rarely angry. Patient states they are always unusually tired/fatigued. Pain experienced in the last 7 days has been none. Patient states that he has experienced no weight loss or gain in last 6 months.     Depression Screening:   PHQ-2 Score: 0      Fall Risk Screening:   In the past year, patient has experienced: no history of falling in past year      Home Safety:  Patient does not have trouble with stairs inside or outside of their home. Patient has working smoke alarms and has working carbon monoxide detector. Home safety hazards include: none.     Nutrition:   Current diet is Low Cholesterol, Low Saturated Fat and Limited junk food.     Medications:   Patient is currently taking over-the-counter supplements. OTC medications include: see medication list. Patient is able to manage medications.  cavity and sinuses. But you can also get ABRS if you have:    Nasal allergies    Long-term nasal swelling and congestion not caused by allergies    Blockage in the nose  Symptoms of ABRS  The symptoms of ABRS may be different for each person and include:    Nasal congestion or blockage    Pain or pressure in the face    Thick, colored drainage from the nose  Other symptoms may include:    Runny nose    Fluid draining from the nose down the throat (postnasal drip)    Headache    Cough    Pain    Fever  Diagnosing ABRS  ABRS may be diagnosed if you ve had an upper respiratory infection like a cold and cough for 10 or more days without improvement or with worsening symptoms. Your healthcare provider will ask about your symptoms and your medical history. The provider will check your vital signs, including your temperature. You ll have a physical exam. The healthcare provider will check your ears, nose, and throat. You likely won t need any tests. If ABRS comes back, you may have a culture or other tests.  Treatment for ABRS  Treatment may include:    Antibiotic medicine. This is for symptoms that last for at least 10 to 14 days.    Nasal corticosteroid medicine. Drops or spray used in the nose can lessen swelling and congestion.    Over-the-counter pain medicine. This is to lessen sinus pain and pressure.    Nasal decongestant medicine. Spray or drops may help to lessen congestion. Do not use them for more than a few days.    Salt wash (saline irrigation). This can help to loosen mucus.  Possible complications of ABRS  ABRS may come back or become long-term (chronic). In rare cases, ABRS may cause complications such as:     Inflamed tissue around the brain and spinal cord (meningitis)    Inflamed tissue around the eyes (orbital cellulitis)    Inflamed bones around the sinuses (osteitis)  These problems may need to be treated in a hospital with intravenous (IV) antibiotic medicine or surgery.  When to call the healthcare      Activities of Daily Living (ADLs)/Instrumental Activities of Daily Living (IADLs):   Walk and transfer into and out of bed and chair?: Yes  Dress and groom yourself?: Yes    Bathe or shower yourself?: Yes    Feed yourself? Yes  Do your laundry/housekeeping?: Yes  Manage your money, pay your bills and track your expenses?: Yes  Make your own meals?: Yes    Do your own shopping?: Yes    Previous Hospitalizations:   Any hospitalizations or ED visits within the last 12 months?: No      Advance Care Planning:   Living will: No    Advanced directive: No      Preventive Screenings      Cardiovascular Screening:    General: Screening Current      Diabetes Screening:     General: Screening Current      Osteoporosis Screening:    General: Screening Not Indicated and History Osteoporosis      Abdominal Aortic Aneurysm (AAA) Screening:    Risk factors include: age between 65-74 yo and tobacco use        Lung Cancer Screening:     General: Screening Not Indicated    Immunizations:  - Immunizations due: Zoster (Shingrix)    Social Drivers of Health     Financial Resource Strain: Low Risk  (6/13/2023)    Received from [facility]    Overall Financial Resource Strain (CARDIA)     Difficulty of Paying Living Expenses: Not hard at all   Food Insecurity: No Food Insecurity (6/12/2025)    Nursing - Inadequate Food Risk Classification     Worried About Running Out of Food in the Last Year: Never true     Ran Out of Food in the Last Year: Never true   Transportation Needs: No Transportation Needs (6/12/2025)    PRAPARE - Transportation     Lack of Transportation (Medical): No     Lack of Transportation (Non-Medical): No   Housing Stability: Low Risk  (6/12/2025)    Housing Stability Vital Sign     Unable to Pay for Housing in the Last Year: No     Number of Times Moved in the Last Year: 0     Homeless in the Last Year: No   Utilities: Not At Risk (6/12/2025)    [facility] Utilities     Threatened with loss of utilities: No  provider  Call your healthcare provider if you have any of the following:    Symptoms that don t get better, or get worse    Symptoms that don t get better after 3 to 5 days on antibiotics    Trouble seeing    Swelling around your eyes    Confusion or trouble staying awake   Date Last Reviewed: 5/1/2017 2000-2019 [employer]. [address] [place] [address], [place] [address]. All rights reserved. This information is not intended as a substitute for professional medical care. Always follow your healthcare professional's instructions.                No results found.    Objective   /70 (BP Location: Left arm, Patient Position: Sitting, Cuff Size: Standard)   Pulse 62   Wt 70.3 kg (155 lb)   SpO2 98%   BMI 23.57 kg/m²     Physical Exam  Vitals and nursing note reviewed.   Constitutional:       Appearance: Normal appearance.   HENT:      Head: Normocephalic and atraumatic.      Right Ear: There is impacted cerumen.      Left Ear: There is impacted cerumen.      Mouth/Throat:      Mouth: Mucous membranes are moist.     Eyes:      Extraocular Movements: Extraocular movements intact.      Conjunctiva/sclera: Conjunctivae normal.      Pupils: Pupils are equal, round, and reactive to light.       Cardiovascular:      Rate and Rhythm: Normal rate and regular rhythm.   Pulmonary:      Effort: Pulmonary effort is normal. No respiratory distress.      Breath sounds: Normal breath sounds.   Abdominal:      General: Bowel sounds are normal. There is no distension.      Palpations: Abdomen is soft.      Tenderness: There is no abdominal tenderness. There is no guarding.     Musculoskeletal:      Comments: Onychomycosis noted to fingers 3 - 5 of left hand as well as right 2nd finger   Feet:      Right foot:      Toenail Condition: Fungal disease present.     Left foot:      Toenail Condition: Fungal disease present.    Skin:     General: Skin is warm and dry.      Findings: Bruising (Dorsum of both hands, and left forearm) present.     Neurological:      General: No focal deficit present.      Mental Status: He is alert. Mental status is at baseline.     Psychiatric:         Mood and Affect: Mood normal.

## 2025-06-12 NOTE — PATIENT INSTRUCTIONS
Medicare Preventive Visit Patient Instructions  Thank you for completing your Welcome to Medicare Visit or Medicare Annual Wellness Visit today. Your next wellness visit will be due in one year (6/13/2026).  The screening/preventive services that you may require over the next 5-10 years are detailed below. Some tests may not apply to you based off risk factors and/or age. Screening tests ordered at today's visit but not completed yet may show as past due. Also, please note that scanned in results may not display below.  Preventive Screenings:  Service Recommendations Previous Testing/Comments   Colorectal Cancer Screening  Colonoscopy    Fecal Occult Blood Test (FOBT)/Fecal Immunochemical Test (FIT)  Fecal DNA/Cologuard Test  Flexible Sigmoidoscopy Age: 45-75 years old   Colonoscopy: every 10 years (May be performed more frequently if at higher risk)  OR  FOBT/FIT: every 1 year  OR  Cologuard: every 3 years  OR  Sigmoidoscopy: every 5 years  Screening may be recommended earlier than age 45 if at higher risk for colorectal cancer. Also, an individualized decision between you and your healthcare provider will decide whether screening between the ages of 76-85 would be appropriate. Colonoscopy: 04/30/2015  FOBT/FIT: Not on file  Cologuard: Not on file  Sigmoidoscopy: Not on file          Prostate Cancer Screening Individualized decision between patient and health care provider in men between ages of 55-69   Medicare will cover every 12 months beginning on the day after your 50th birthday PSA: 0.48 ng/mL           Hepatitis C Screening Once for adults born between 1945 and 1965  More frequently in patients at high risk for Hepatitis C Hep C Antibody: Not on file        Diabetes Screening 1-2 times per year if you're at risk for diabetes or have pre-diabetes Fasting glucose: 77 mg/dL (5/23/2025)  A1C: 5.2 % (4/5/2024)      Cholesterol Screening Once every 5 years if you don't have a lipid disorder. May order more often  based on risk factors. Lipid panel: 08/01/2023         Other Preventive Screenings Covered by Medicare:  Abdominal Aortic Aneurysm (AAA) Screening: covered once if your at risk. You're considered to be at risk if you have a family history of AAA or a male between the age of 65-75 who smoking at least 100 cigarettes in your lifetime.  Lung Cancer Screening: covers low dose CT scan once per year if you meet all of the following conditions: (1) Age 55-77; (2) No signs or symptoms of lung cancer; (3) Current smoker or have quit smoking within the last 15 years; (4) You have a tobacco smoking history of at least 20 pack years (packs per day x number of years you smoked); (5) You get a written order from a healthcare provider.  Glaucoma Screening: covered annually if you're considered high risk: (1) You have diabetes OR (2) Family history of glaucoma OR (3)  aged 50 and older OR (4)  American aged 65 and older  Osteoporosis Screening: covered every 2 years if you meet one of the following conditions: (1) Have a vertebral abnormality; (2) On glucocorticoid therapy for more than 3 months; (3) Have primary hyperparathyroidism; (4) On osteoporosis medications and need to assess response to drug therapy.  HIV Screening: covered annually if you're between the age of 15-65. Also covered annually if you are younger than 15 and older than 65 with risk factors for HIV infection. For pregnant patients, it is covered up to 3 times per pregnancy.    Immunizations:  Immunization Recommendations   Influenza Vaccine Annual influenza vaccination during flu season is recommended for all persons aged >= 6 months who do not have contraindications   Pneumococcal Vaccine   * Pneumococcal conjugate vaccine = PCV13 (Prevnar 13), PCV15 (Vaxneuvance), PCV20 (Prevnar 20)  * Pneumococcal polysaccharide vaccine = PPSV23 (Pneumovax) Adults 19-65 yo with certain risk factors or if 65+ yo  If never received any pneumonia vaccine:  recommend Prevnar 20 (PCV20)  Give PCV20 if previously received 1 dose of PCV13 or PPSV23   Hepatitis B Vaccine 3 dose series if at intermediate or high risk (ex: diabetes, end stage renal disease, liver disease)   Respiratory syncytial virus (RSV) Vaccine - COVERED BY MEDICARE PART D  * RSVPreF3 (Arexvy) CDC recommends that adults 60 years of age and older may receive a single dose of RSV vaccine using shared clinical decision-making (SCDM)   Tetanus (Td) Vaccine - COST NOT COVERED BY MEDICARE PART B Following completion of primary series, a booster dose should be given every 10 years to maintain immunity against tetanus. Td may also be given as tetanus wound prophylaxis.   Tdap Vaccine - COST NOT COVERED BY MEDICARE PART B Recommended at least once for all adults. For pregnant patients, recommended with each pregnancy.   Shingles Vaccine (Shingrix) - COST NOT COVERED BY MEDICARE PART B  2 shot series recommended in those 19 years and older who have or will have weakened immune systems or those 50 years and older     Health Maintenance Due:      Topic Date Due   • Hepatitis C Screening  Never done   • Colorectal Cancer Screening  04/30/2025     Immunizations Due:      Topic Date Due   • COVID-19 Vaccine (5 - 2024-25 season) 09/01/2024   • Influenza Vaccine (Season Ended) 09/01/2025     Advance Directives   What are advance directives?  Advance directives are legal documents that state your wishes and plans for medical care. These plans are made ahead of time in case you lose your ability to make decisions for yourself. Advance directives can apply to any medical decision, such as the treatments you want, and if you want to donate organs.   What are the types of advance directives?  There are many types of advance directives, and each state has rules about how to use them. You may choose a combination of any of the following:  Living will:  This is a written record of the treatment you want. You can also choose  which treatments you do not want, which to limit, and which to stop at a certain time. This includes surgery, medicine, IV fluid, and tube feedings.   Durable power of  for healthcare (DPAHC):  This is a written record that states who you want to make healthcare choices for you when you are unable to make them for yourself. This person, called a proxy, is usually a family member or a friend. You may choose more than 1 proxy.  Do not resuscitate (DNR) order:  A DNR order is used in case your heart stops beating or you stop breathing. It is a request not to have certain forms of treatment, such as CPR. A DNR order may be included in other types of advance directives.  Medical directive:  This covers the care that you want if you are in a coma, near death, or unable to make decisions for yourself. You can list the treatments you want for each condition. Treatment may include pain medicine, surgery, blood transfusions, dialysis, IV or tube feedings, and a ventilator (breathing machine).  Values history:  This document has questions about your views, beliefs, and how you feel and think about life. This information can help others choose the care that you would choose.  Why are advance directives important?  An advance directive helps you control your care. Although spoken wishes may be used, it is better to have your wishes written down. Spoken wishes can be misunderstood, or not followed. Treatments may be given even if you do not want them. An advance directive may make it easier for your family to make difficult choices about your care.       © Copyright [employer] 2018 Information is for End User's use only and may not be sold, redistributed or otherwise used for commercial purposes. All illustrations and images included in CareNotes® are the copyrighted property of [employer] or [employer]

## 2025-06-12 NOTE — ASSESSMENT & PLAN NOTE
Patient was last seen by neurology 3/25/2024 for evaluation of memory loss and aphasia.  Patient has had workup for the symptoms in the past at which time he was diagnosed with mild cognitive impairment 2019.  He was also seen by speech therapy.  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  He was advised to follow-up in 4 months and was referred to neuropsychology.  Patient and wife wish to postpone further workup at this time.

## 2025-06-12 NOTE — ASSESSMENT & PLAN NOTE
Patient denies any symptoms of GERD/heartburn which they are attributing to dietary change.  Patient has eliminated gluten and dairy from his diet as well as decrease sugar and processed meat intake

## 2025-07-08 DIAGNOSIS — R19.5 YEAST IN STOOL: Primary | ICD-10-CM

## 2025-07-15 ENCOUNTER — TELEPHONE (OUTPATIENT)
Age: 75
End: 2025-07-15

## 2025-07-16 ENCOUNTER — TELEPHONE (OUTPATIENT)
Age: 75
End: 2025-07-16

## 2025-07-17 DIAGNOSIS — B35.3 TINEA PEDIS OF RIGHT FOOT: Primary | ICD-10-CM

## 2025-07-17 RX ORDER — CLOTRIMAZOLE 1 %
CREAM (GRAM) TOPICAL 2 TIMES DAILY
Qty: 42 G | Refills: 0 | Status: SHIPPED | OUTPATIENT
Start: 2025-07-17 | End: 2025-07-31

## 2025-07-17 NOTE — TELEPHONE ENCOUNTER
"Billie, daughter not on pt communication. Pt merged into call by daughter. Pt provided identifiers and permission granted to speak/provide daughter information pertaining medical services.   ___________    Reviewed reason why ID recommended pt needs to see GI. Daughter stating pt also has \"fungus\" with his toenails. Advised he should follow up with Podiatry too for ID would not treat that until seen by Podiatrist. Daughter stated has areas on skin that could be fungal, advised to follow up with Dermatology. Daughter acknowledged instructions, will touch base with PCP, and had no further questions at thins time.  "
ID Referral    Dr. Simon,    Thank you for referring Jose Raaz,  1950, to [facility]. Our provider has reviewed your patient's referral and notes Candida in the stool is not necessarily an indication for an infectious disease appointment as candida is considered a normal teetee for the GI system. If there are concerns for GI related symptoms it is recommended that the patient follow up with a Gastroenterologist for additional work up.     Please notify your patient that this referral has been closed.     Thank you again and have a good day.  
LM on VM for patient.  
Please notify patient's wife of the message sent by ID office regarding his referral.   
Size Of Lesion: 5mm
Detail Level: Detailed
Morphology Per Location (Optional): Light brown macule

## 2025-07-25 ENCOUNTER — APPOINTMENT (OUTPATIENT)
Dept: LAB | Age: 75
End: 2025-07-25
Attending: INTERNAL MEDICINE
Payer: MEDICARE

## 2025-07-25 DIAGNOSIS — K87: ICD-10-CM

## 2025-07-25 DIAGNOSIS — K63.8219 SMALL INTESTINAL BACTERIAL OVERGROWTH: ICD-10-CM

## 2025-07-25 LAB — 25(OH)D3 SERPL-MCNC: 37.2 NG/ML (ref 30–100)

## 2025-07-25 PROCEDURE — 36415 COLL VENOUS BLD VENIPUNCTURE: CPT

## 2025-07-25 PROCEDURE — 84590 ASSAY OF VITAMIN A: CPT

## 2025-07-25 PROCEDURE — 84597 ASSAY OF VITAMIN K: CPT

## 2025-07-25 PROCEDURE — 82306 VITAMIN D 25 HYDROXY: CPT

## 2025-07-25 PROCEDURE — 84446 ASSAY OF VITAMIN E: CPT

## 2025-07-28 LAB — VIT A SERPL-MCNC: 26.8 UG/DL (ref 22–69.5)

## 2025-07-29 LAB
A-TOCOPHEROL VIT E SERPL-MCNC: 17.2 MG/L (ref 9–29)
GAMMA TOCOPHEROL SERPL-MCNC: 0.2 MG/L (ref 0.5–4.9)
PHYTONADIONE SERPL-MCNC: 0.22 NG/ML (ref 0.1–2.2)

## 2025-07-31 ENCOUNTER — APPOINTMENT (OUTPATIENT)
Dept: RADIOLOGY | Age: 75
End: 2025-07-31
Payer: MEDICARE

## 2025-07-31 ENCOUNTER — OFFICE VISIT (OUTPATIENT)
Dept: INTERNAL MEDICINE CLINIC | Facility: CLINIC | Age: 75
End: 2025-07-31
Payer: MEDICARE

## 2025-07-31 VITALS
HEART RATE: 60 BPM | DIASTOLIC BLOOD PRESSURE: 70 MMHG | WEIGHT: 154 LBS | OXYGEN SATURATION: 100 % | BODY MASS INDEX: 23.42 KG/M2 | SYSTOLIC BLOOD PRESSURE: 110 MMHG

## 2025-07-31 DIAGNOSIS — M54.50 ACUTE BILATERAL LOW BACK PAIN WITHOUT SCIATICA: ICD-10-CM

## 2025-07-31 DIAGNOSIS — M54.50 ACUTE BILATERAL LOW BACK PAIN WITHOUT SCIATICA: Primary | ICD-10-CM

## 2025-07-31 PROCEDURE — 99213 OFFICE O/P EST LOW 20 MIN: CPT | Performed by: INTERNAL MEDICINE

## 2025-07-31 PROCEDURE — G2211 COMPLEX E/M VISIT ADD ON: HCPCS | Performed by: INTERNAL MEDICINE

## 2025-07-31 PROCEDURE — 72110 X-RAY EXAM L-2 SPINE 4/>VWS: CPT

## 2025-08-04 ENCOUNTER — APPOINTMENT (OUTPATIENT)
Dept: LAB | Age: 75
End: 2025-08-04
Payer: MEDICARE

## 2025-08-04 DIAGNOSIS — K87: ICD-10-CM

## 2025-08-04 DIAGNOSIS — K63.8219 SMALL INTESTINAL BACTERIAL OVERGROWTH: ICD-10-CM

## 2025-08-04 PROCEDURE — 82653 EL-1 FECAL QUANTITATIVE: CPT

## 2025-08-05 DIAGNOSIS — M54.50 BILATERAL LOW BACK PAIN WITHOUT SCIATICA, UNSPECIFIED CHRONICITY: Primary | ICD-10-CM

## 2025-08-06 LAB — ELASTASE PANC STL-MCNT: 30 UG/G

## 2025-08-13 ENCOUNTER — APPOINTMENT (OUTPATIENT)
Dept: LAB | Age: 75
End: 2025-08-13
Payer: MEDICARE

## 2025-08-13 DIAGNOSIS — B35.1 ONYCHOMYCOSIS: ICD-10-CM

## 2025-08-13 LAB
ALBUMIN SERPL BCG-MCNC: 4 G/DL (ref 3.5–5)
ALP SERPL-CCNC: 69 U/L (ref 34–104)
ALT SERPL W P-5'-P-CCNC: 13 U/L (ref 7–52)
AST SERPL W P-5'-P-CCNC: 18 U/L (ref 13–39)
BILIRUB DIRECT SERPL-MCNC: 0.08 MG/DL (ref 0–0.2)
BILIRUB SERPL-MCNC: 0.4 MG/DL (ref 0.2–1)
PROT SERPL-MCNC: 6.6 G/DL (ref 6.4–8.4)

## 2025-08-13 PROCEDURE — 36415 COLL VENOUS BLD VENIPUNCTURE: CPT

## 2025-08-13 PROCEDURE — 80076 HEPATIC FUNCTION PANEL: CPT

## 2025-08-15 ENCOUNTER — EVALUATION (OUTPATIENT)
Dept: PHYSICAL THERAPY | Age: 75
End: 2025-08-15
Attending: INTERNAL MEDICINE
Payer: MEDICARE

## 2025-08-22 ENCOUNTER — OFFICE VISIT (OUTPATIENT)
Dept: PHYSICAL THERAPY | Age: 75
End: 2025-08-22
Attending: INTERNAL MEDICINE
Payer: MEDICARE

## 2025-08-22 DIAGNOSIS — M54.50 BILATERAL LOW BACK PAIN WITHOUT SCIATICA, UNSPECIFIED CHRONICITY: Primary | ICD-10-CM

## 2025-08-22 PROCEDURE — 97110 THERAPEUTIC EXERCISES: CPT | Performed by: PHYSICAL THERAPIST

## (undated) DEVICE — PACK TUR

## (undated) DEVICE — GUIDEWIRE STRGHT TIP 0.035 IN  SOLO PLUS

## (undated) DEVICE — SHEATH URETERAL ACCESS 12/14FR 35CM PROXIS

## (undated) DEVICE — SYRINGE 20ML LL

## (undated) DEVICE — GLOVE SRG BIOGEL 8

## (undated) DEVICE — UROLOGIC DRAIN BAG: Brand: UNBRANDED

## (undated) DEVICE — TRAP,MUCUS SPECIMEN, 80CC: Brand: MEDLINE

## (undated) DEVICE — INVIEW CLEAR LEGGINGS: Brand: CONVERTORS

## (undated) DEVICE — 3M™ TEGADERM™ TRANSPARENT FILM DRESSING FRAME STYLE, 1624W, 2-3/8 IN X 2-3/4 IN (6 CM X 7 CM), 100/CT 4CT/CASE: Brand: 3M™ TEGADERM™

## (undated) DEVICE — PREMIUM DRY TRAY LF: Brand: MEDLINE INDUSTRIES, INC.

## (undated) DEVICE — SINGLE PORT MANIFOLD: Brand: NEPTUNE 2

## (undated) DEVICE — GUIDEWIRE ANGLE TIP 0.038 IN SOLO PLUS

## (undated) DEVICE — SINGLE-USE DIGITAL FLEXIBLE URETEROSCOPE: Brand: APTRA

## (undated) DEVICE — TUBING SUCTION 5MM X 12 FT

## (undated) DEVICE — FIBER STD QUANTA 365 MICRON

## (undated) DEVICE — CVAC ASPIRATION SYSTEM

## (undated) DEVICE — URETERAL CATHETER 5 FR CONE TIP

## (undated) DEVICE — 4-PORT MANIFOLD: Brand: NEPTUNE 2

## (undated) DEVICE — 3M™ STERI-STRIP™ COMPOUND BENZOIN TINCTURE 40 BAGS/CARTON 4 CARTONS/CASE C1544: Brand: 3M™ STERI-STRIP™

## (undated) DEVICE — PUMPING SYSTEM SINGLE ACTION STD

## (undated) DEVICE — ENDOSCOPIC VALVE WITH ADAPTER.: Brand: SURSEAL® II